# Patient Record
Sex: FEMALE | Race: WHITE | NOT HISPANIC OR LATINO | Employment: FULL TIME | URBAN - METROPOLITAN AREA
[De-identification: names, ages, dates, MRNs, and addresses within clinical notes are randomized per-mention and may not be internally consistent; named-entity substitution may affect disease eponyms.]

---

## 2017-01-30 ENCOUNTER — TRANSFERRED RECORDS (OUTPATIENT)
Dept: HEALTH INFORMATION MANAGEMENT | Facility: CLINIC | Age: 32
End: 2017-01-30

## 2017-02-13 ENCOUNTER — OFFICE VISIT (OUTPATIENT)
Dept: FAMILY MEDICINE | Facility: CLINIC | Age: 32
End: 2017-02-13
Payer: COMMERCIAL

## 2017-02-13 VITALS
BODY MASS INDEX: 22.48 KG/M2 | HEIGHT: 70 IN | WEIGHT: 157 LBS | DIASTOLIC BLOOD PRESSURE: 64 MMHG | HEART RATE: 80 BPM | TEMPERATURE: 98.4 F | SYSTOLIC BLOOD PRESSURE: 96 MMHG

## 2017-02-13 DIAGNOSIS — Z01.818 PREOP GENERAL PHYSICAL EXAM: Primary | ICD-10-CM

## 2017-02-13 DIAGNOSIS — Z31.9 INFERTILITY MANAGEMENT: ICD-10-CM

## 2017-02-13 PROCEDURE — 99214 OFFICE O/P EST MOD 30 MIN: CPT | Performed by: PHYSICIAN ASSISTANT

## 2017-02-13 NOTE — MR AVS SNAPSHOT
After Visit Summary   2/13/2017    Lolis Johnson    MRN: [id number]           Patient Information     Date Of Birth          1985        Visit Information        Provider Department      2/13/2017 6:00 PM Trudi Agrawal PA-C [facility]        Today's Diagnoses     Preop general physical exam    -  1    Infertility management          Care Instructions      Before Your Surgery      Call your surgeon if there is any change in your health. This includes signs of a cold or flu (such as a sore throat, runny nose, cough, rash or fever).    Do not smoke, drink alcohol or take over the counter medicine (unless your surgeon or primary care doctor tells you to) for the 24 hours before and after surgery.    If you take prescribed drugs: Follow your doctor s orders about which medicines to take and which to stop until after surgery.    Eating and drinking prior to surgery: follow the instructions from your surgeon    Take a shower or bath the night before surgery. Use the soap your surgeon gave you to gently clean your skin. If you do not have soap from your surgeon, use your regular soap. Do not shave or scrub the surgery site.  Wear clean pajamas and have clean sheets on your bed.         Follow-ups after your visit        Who to contact     If you have questions or need follow up information about today's clinic visit or your schedule please contact [facility] directly at [phone].  Normal or non-critical lab and imaging results will be communicated to you by MyChart, letter or phone within 4 business days after the clinic has received the results. If you do not hear from us within 7 days, please contact the clinic through MyChart or phone. If you have a critical or abnormal lab result, we will notify you by phone as soon as possible.  Submit refill requests through [employer] or call your pharmacy and they will forward the refill request to us. Please allow 3  "business days for your refill to be completed.          Additional Information About Your Visit        MyChart Information     [employer]orena [employer] gives you secure access to your electronic health record. If you see a primary care provider, you can also send messages to your care team and make appointments. If you have questions, please call your primary care clinic.  If you do not have a primary care provider, please call [phone] and they will assist you.        Care EveryWhere ID     This is your Care EveryWhere ID. This could be used by other organizations to access your [place] medical records  [id number]        Your Vitals Were     Pulse Temperature Height Last Period BMI (Body Mass Index)       80 98.4  F (36.9  C) (Oral) 5' 9.75\" (1.772 m) 02/07/2017 (Approximate) 22.69 kg/m2        Blood Pressure from Last 3 Encounters:   02/13/17 96/64   11/14/16 116/68   04/28/16 116/62    Weight from Last 3 Encounters:   02/13/17 157 lb (71.2 kg)   11/14/16 152 lb 4 oz (69.1 kg)   04/28/16 152 lb 12.8 oz (69.3 kg)              Today, you had the following     No orders found for display         Today's Medication Changes          These changes are accurate as of: 2/13/17  6:19 PM.  If you have any questions, ask your nurse or doctor.               These medicines have changed or have updated prescriptions.        Dose/Directions    levothyroxine 25 MCG tablet   Commonly known as:  SYNTHROID/LEVOTHROID   This may have changed:  additional instructions   Used for:  Subclinical hypothyroidism        Dose:  25 mcg   Take 1 tablet (25 mcg) by mouth daily   Quantity:  90 tablet   Refills:  3                Primary Care Provider    None Specified       No primary provider on file.        Thank you!     Thank you for choosing [facility]  for your care. Our goal is always to provide you with excellent care. Hearing back from our patients is one way we can continue to improve our services. Please take a few minutes " to complete the written survey that you may receive in the mail after your visit with us. Thank you!             Your Updated Medication List - Protect others around you: Learn how to safely use, store and throw away your medicines at www.disposemymeds.org.          This list is accurate as of: 2/13/17  6:19 PM.  Always use your most recent med list.                   Brand Name Dispense Instructions for use    levothyroxine 25 MCG tablet    SYNTHROID/LEVOTHROID    90 tablet    Take 1 tablet (25 mcg) by mouth daily       MULTIVITAMIN PO

## 2017-02-13 NOTE — PROGRESS NOTES
[address] [id number] [phone]  Dept: [phone]    PRE-OP EVALUATION:  Today's date: 2017    Lolis Johnson (: 1985) presents for pre-operative evaluation assessment as requested by Dr. Constantino.  She requires evaluation and anesthesia risk assessment prior to undergoing surgery/procedure for treatment of fertility .  Proposed procedure: IVF     Date of Surgery/ Procedure: 17  Time of Surgery/ Procedure: 10am  Hospital/Surgical Facility: [facility]  Fax number for surgical facility: [phone]  Primary Physician: No primary care provider on file.  Type of Anesthesia Anticipated: to be determined    Patient has a Health Care Directive or Living Will:  NO    1. NO - Do you have a history of heart attack, stroke, stent, bypass or surgery on an artery in the head, neck, heart or legs?  2. NO - Do you ever have any pain or discomfort in your chest?  3. NO - Do you have a history of  Heart Failure?  4. NO - Are you troubled by shortness of breath when: walking on the level, up a slight hill or at night?  5. NO - Do you currently have a cold, bronchitis or other respiratory infection?  6. NO - Do you have a cough, shortness of breath or wheezing?  7. NO - Do you sometimes get pains in the calves of your legs when you walk?  8. NO - Do you or anyone in your family have previous history of blood clots?  9. NO - Do you or does anyone in your family have a serious bleeding problem such as prolonged bleeding following surgeries or cuts?  10. NO - Have you ever had problems with anemia or been told to take iron pills?  11. NO - Have you had any abnormal blood loss such as black, tarry or bloody stools, or abnormal vaginal bleeding?  12. NO - Have you ever had a blood transfusion?  13. NO - Have you or any of your relatives ever had problems with anesthesia?  14. NO - Do you have sleep apnea, excessive snoring or daytime drowsiness?  15. NO  - Do you have any prosthetic heart valves?  16. NO - Do you have prosthetic joints?  17. NO - Is there any chance that you may be pregnant?      HPI:                                                      Brief HPI related to upcoming procedure: Infertility management      HYPOTHYROIDISM - Patient has a longstanding history of chronic Hypothyroidism. Patient has been doing well, noting no tremor, insomnia, hair loss or changes in skin texture. Last TSH value of   TSH   Date Value Ref Range Status   07/12/2016 1.70 0.40 - 4.00 mU/L Final   . Continues to take medications as directed, without adverse reactions or side effects.                                                                                                                                                                                                                        .    MEDICAL HISTORY:                                                      Patient Active Problem List    Diagnosis Date Noted     Subclinical hypothyroidism 04/29/2016     Priority: Medium      Past Medical History   Diagnosis Date     NO ACTIVE PROBLEMS      Subclinical hypothyroidism 4/29/2016     Past Surgical History   Procedure Laterality Date     Sinus surgery       Current Outpatient Prescriptions   Medication Sig Dispense Refill     levothyroxine (SYNTHROID, LEVOTHROID) 25 MCG tablet Take 1 tablet (25 mcg) by mouth daily (Patient taking differently: Take 25 mcg by mouth daily Take 2 tablets daily (50mcg)) 90 tablet 3     Multiple Vitamins-Minerals (MULTIVITAMIN PO)        OTC products: None, except as noted above    Allergies   Allergen Reactions     Sulfa Drugs       Latex Allergy: NO    Social History   Substance Use Topics     Smoking status: Never Smoker     Smokeless tobacco: Never Used     Alcohol use 0.0 oz/week      Comment: occ     History   Drug Use No       REVIEW OF SYSTEMS:                                                    Constitutional, neuro, ENT, endocrine,  "pulmonary, cardiac, gastrointestinal, genitourinary, musculoskeletal, integument and psychiatric systems are negative, except as otherwise noted.    EXAM:                                                    BP 96/64 (Cuff Size: Adult Regular)  Pulse 80  Temp 98.4  F (36.9  C) (Oral)  Ht 5' 9.75\" (1.772 m)  Wt 157 lb (71.2 kg)  LMP 02/07/2017 (Approximate)  BMI 22.69 kg/m2    GENERAL APPEARANCE: healthy, alert and no distress     EYES: EOMI,- PERRL     HENT: ear canals and TM's normal and nose and mouth without ulcers or lesions     NECK: no adenopathy, no asymmetry, masses, or scars and thyroid normal to palpation     RESP: lungs clear to auscultation - no rales, rhonchi or wheezes     CV: regular rates and rhythm, normal S1 S2, no S3 or S4 and no murmur, click or rub -     ABDOMEN:  soft, nontender, no HSM or masses and bowel sounds normal     MS: extremities normal- no gross deformities noted, no evidence of inflammation in joints, FROM in all extremities.     NEURO: Normal strength and tone, sensory exam grossly normal, mentation intact and speech normal     PSYCH: mentation appears normal. and affect normal/bright    DIAGNOSTICS:                                                    No labs or EKG required for low risk surgery and no cardiac risk factors.    IMPRESSION:                                                    Reason for surgery/procedure: Infertility management    The proposed surgical procedure is considered INTERMEDIATE risk.    REVISED CARDIAC RISK INDEX  The patient has the following serious cardiovascular risks for perioperative complications such as (MI, PE, VFib and 3  AV Block):  No serious cardiac risks  INTERPRETATION: 0 risks: Class I (very low risk - 0.4% complication rate)    The patient has the following additional risks for perioperative complications:  No identified additional risks      ICD-10-CM    1. Preop general physical exam Z01.818    2. Infertility management Z31.9  "       RECOMMENDATIONS:                                                      --Patient is to take all scheduled medications on the day of surgery   APPROVAL GIVEN to proceed with proposed procedure, without further diagnostic evaluation       Signed Electronically by: Trudi Agrawal PA-C    Copy of this evaluation report is provided to requesting physician.    [place] Preop Guidelines

## 2017-02-14 NOTE — NURSING NOTE
"Chief Complaint   Patient presents with     Pre-Op Exam       Initial BP 96/64 (Cuff Size: Adult Regular)  Pulse 80  Temp 98.4  F (36.9  C) (Oral)  Ht 5' 9.75\" (1.772 m)  Wt 157 lb (71.2 kg)  LMP 02/07/2017 (Approximate)  BMI 22.69 kg/m2 Estimated body mass index is 22.69 kg/(m^2) as calculated from the following:    Height as of this encounter: 5' 9.75\" (1.772 m).    Weight as of this encounter: 157 lb (71.2 kg).  Medication Reconciliation: complete   Melisa De La Torre, Certified Medical Assistant (AAMA)     "

## 2017-02-14 NOTE — NURSING NOTE
Pre-op faxed to [facility] at [phone]    Melisa De La Torre, Certified Medical Assistant (AAMA)

## 2017-02-24 ENCOUNTER — TRANSFERRED RECORDS (OUTPATIENT)
Dept: HEALTH INFORMATION MANAGEMENT | Facility: CLINIC | Age: 32
End: 2017-02-24

## 2017-03-08 ENCOUNTER — TRANSFERRED RECORDS (OUTPATIENT)
Dept: HEALTH INFORMATION MANAGEMENT | Facility: CLINIC | Age: 32
End: 2017-03-08

## 2017-03-17 ENCOUNTER — TELEPHONE (OUTPATIENT)
Dept: MATERNAL FETAL MEDICINE | Facility: CLINIC | Age: 32
End: 2017-03-17

## 2017-03-20 ENCOUNTER — MYC MEDICAL ADVICE (OUTPATIENT)
Dept: OBGYN | Facility: CLINIC | Age: 32
End: 2017-03-20

## 2017-03-20 DIAGNOSIS — Z82.79 FAMILY HISTORY OF CHROMOSOMAL ABNORMALITY: Primary | ICD-10-CM

## 2017-03-20 NOTE — TELEPHONE ENCOUNTER
Lolis contacted me stating that she has recently become pregnant after multiple rounds of IVF.  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  At the time the couple had thought that they would try to achieve a pregnancy spontaneously and then would pursue [facility] for chromosome testing in pregnancy.  After a period of infertility the couple chose to proceed with IVF; however, was not able to have PGD.  Lolis has recently had a postive pregnancy test and is wondering about scheduling a CVS.    I explained that, unfortunately, our provider who previously performed CVS is no longer in our practice.  I went on to review the options of [facility]S at [facility] versus [place].      Lolis indicated that she would like to talk with her primary provider, Dr. Lu, to see what her recommendation would be.  She understands that she may contact me if she has further questions or needs help with a referral.    Kathy Nugent MS [facility]  Certified Genetic Counselor  Maternal Fetal Medicine  [facility]  Voice Mail:  [phone]  E-Mail:  [email or web address]  Pager:  [phone]

## 2017-03-22 ENCOUNTER — TELEPHONE (OUTPATIENT)
Dept: MATERNAL FETAL MEDICINE | Facility: CLINIC | Age: 32
End: 2017-03-22

## 2017-03-30 NOTE — TELEPHONE ENCOUNTER
Order and records for [facility]S were faxed to [facility].  Pt hasn't had any labs drawn yet, so order placed per Dr. Lu. Pt only 6 weeks pregnant.  [facility] wanted to make sure viable pregnancy to schedule and pt has u/s with Reproductive Medicine clinic at 9 weeks for another u/s. Lolis was given [facility] phone number to call for any further questions or concerns with getting the CVS scheduled. Melisa Harmon RN

## 2017-04-03 DIAGNOSIS — Z82.79 FAMILY HISTORY OF CHROMOSOMAL ABNORMALITY: ICD-10-CM

## 2017-04-03 PROCEDURE — 86850 RBC ANTIBODY SCREEN: CPT | Performed by: OBSTETRICS & GYNECOLOGY

## 2017-04-03 PROCEDURE — 36415 COLL VENOUS BLD VENIPUNCTURE: CPT | Performed by: OBSTETRICS & GYNECOLOGY

## 2017-04-03 PROCEDURE — 86900 BLOOD TYPING SEROLOGIC ABO: CPT | Performed by: OBSTETRICS & GYNECOLOGY

## 2017-04-03 PROCEDURE — 86901 BLOOD TYPING SEROLOGIC RH(D): CPT | Performed by: OBSTETRICS & GYNECOLOGY

## 2017-04-04 LAB
ABO + RH BLD: NORMAL
ABO + RH BLD: NORMAL
BLD GP AB SCN SERPL QL: NORMAL
BLOOD BANK CMNT PATIENT-IMP: NORMAL
SPECIMEN EXP DATE BLD: NORMAL

## 2017-04-05 NOTE — TELEPHONE ENCOUNTER
TC to patient to find out where to fax results. Need to know which [place]  to fax it to.    Brianna Verdugo, RN-BSN

## 2017-04-05 NOTE — TELEPHONE ENCOUNTER
Gilbert Danielle,  My nurses might need a release of information, but I'll have them fax the results over if they can.    Do you have more appointments with your fertility clinic before then?    Dr. Lu

## 2017-04-24 ENCOUNTER — PRENATAL OFFICE VISIT (OUTPATIENT)
Dept: NURSING | Facility: CLINIC | Age: 32
End: 2017-04-24
Payer: COMMERCIAL

## 2017-04-24 VITALS — DIASTOLIC BLOOD PRESSURE: 54 MMHG | BODY MASS INDEX: 23.7 KG/M2 | WEIGHT: 164 LBS | SYSTOLIC BLOOD PRESSURE: 94 MMHG

## 2017-04-24 DIAGNOSIS — E03.8 SUBCLINICAL HYPOTHYROIDISM: ICD-10-CM

## 2017-04-24 DIAGNOSIS — N91.2 ABSENCE OF MENSTRUATION: ICD-10-CM

## 2017-04-24 DIAGNOSIS — O09.91 HIGH-RISK PREGNANCY, FIRST TRIMESTER: Primary | ICD-10-CM

## 2017-04-24 PROBLEM — O09.90 HIGH-RISK PREGNANCY: Status: ACTIVE | Noted: 2017-04-24

## 2017-04-24 LAB
ABO + RH BLD: NORMAL
ABO + RH BLD: NORMAL
ALBUMIN UR-MCNC: NEGATIVE MG/DL
APPEARANCE UR: CLEAR
BETA HCG QUAL IFA URINE: POSITIVE
BILIRUB UR QL STRIP: NEGATIVE
BLD GP AB SCN SERPL QL: NORMAL
BLOOD BANK CMNT PATIENT-IMP: NORMAL
COLOR UR AUTO: YELLOW
ERYTHROCYTE [DISTWIDTH] IN BLOOD BY AUTOMATED COUNT: 12.3 % (ref 10–15)
GLUCOSE UR STRIP-MCNC: NEGATIVE MG/DL
HCT VFR BLD AUTO: 37.1 % (ref 35–47)
HGB BLD-MCNC: 12.5 G/DL (ref 11.7–15.7)
HGB UR QL STRIP: NEGATIVE
KETONES UR STRIP-MCNC: NEGATIVE MG/DL
LEUKOCYTE ESTERASE UR QL STRIP: ABNORMAL
MCH RBC QN AUTO: 32.3 PG (ref 26.5–33)
MCHC RBC AUTO-ENTMCNC: 33.7 G/DL (ref 31.5–36.5)
MCV RBC AUTO: 96 FL (ref 78–100)
NITRATE UR QL: NEGATIVE
PH UR STRIP: 7 PH (ref 5–7)
PLATELET # BLD AUTO: 247 10E9/L (ref 150–450)
RBC # BLD AUTO: 3.87 10E12/L (ref 3.8–5.2)
SP GR UR STRIP: 1.01 (ref 1–1.03)
SPECIMEN EXP DATE BLD: NORMAL
URN SPEC COLLECT METH UR: ABNORMAL
UROBILINOGEN UR STRIP-ACNC: 0.2 EU/DL (ref 0.2–1)
WBC # BLD AUTO: 9.3 10E9/L (ref 4–11)

## 2017-04-24 PROCEDURE — 86780 TREPONEMA PALLIDUM: CPT | Performed by: OBSTETRICS & GYNECOLOGY

## 2017-04-24 PROCEDURE — 86900 BLOOD TYPING SEROLOGIC ABO: CPT | Performed by: OBSTETRICS & GYNECOLOGY

## 2017-04-24 PROCEDURE — 84443 ASSAY THYROID STIM HORMONE: CPT | Performed by: OBSTETRICS & GYNECOLOGY

## 2017-04-24 PROCEDURE — 81003 URINALYSIS AUTO W/O SCOPE: CPT | Performed by: OBSTETRICS & GYNECOLOGY

## 2017-04-24 PROCEDURE — 86762 RUBELLA ANTIBODY: CPT | Performed by: OBSTETRICS & GYNECOLOGY

## 2017-04-24 PROCEDURE — 86850 RBC ANTIBODY SCREEN: CPT | Performed by: OBSTETRICS & GYNECOLOGY

## 2017-04-24 PROCEDURE — 86901 BLOOD TYPING SEROLOGIC RH(D): CPT | Performed by: OBSTETRICS & GYNECOLOGY

## 2017-04-24 PROCEDURE — 36415 COLL VENOUS BLD VENIPUNCTURE: CPT | Performed by: OBSTETRICS & GYNECOLOGY

## 2017-04-24 PROCEDURE — 84703 CHORIONIC GONADOTROPIN ASSAY: CPT | Performed by: OBSTETRICS & GYNECOLOGY

## 2017-04-24 PROCEDURE — 99207 ZZC NO CHARGE NURSE ONLY: CPT

## 2017-04-24 PROCEDURE — 85027 COMPLETE CBC AUTOMATED: CPT | Performed by: OBSTETRICS & GYNECOLOGY

## 2017-04-24 PROCEDURE — 87086 URINE CULTURE/COLONY COUNT: CPT | Performed by: OBSTETRICS & GYNECOLOGY

## 2017-04-24 PROCEDURE — 87389 HIV-1 AG W/HIV-1&-2 AB AG IA: CPT | Performed by: OBSTETRICS & GYNECOLOGY

## 2017-04-24 PROCEDURE — 87340 HEPATITIS B SURFACE AG IA: CPT | Performed by: OBSTETRICS & GYNECOLOGY

## 2017-04-24 RX ORDER — CETIRIZINE HYDROCHLORIDE 10 MG/1
10 TABLET ORAL DAILY
COMMUNITY
End: 2017-06-29

## 2017-04-24 NOTE — MR AVS SNAPSHOT
After Visit Summary   4/24/2017    Lolis Johnson    MRN: [id number]           Patient Information     Date Of Birth          1985        Visit Information        Provider Department      4/24/2017 2:00 PM NE RN [facility]        Today's Diagnoses     High-risk pregnancy    -  1    Absence of menstruation        Hypothyroidism           Follow-ups after your visit        Your next 10 appointments already scheduled     May 11, 2017  8:15 AM CDT   New Prenatal with Christiane Lu MD   [facility]    [address] [place] [id number] [phone]              Who to contact     If you have questions or need follow up information about today's clinic visit or your schedule please contact [facility] directly at [phone].  Normal or non-critical lab and imaging results will be communicated to you by MyChart, letter or phone within 4 business days after the clinic has received the results. If you do not hear from us within 7 days, please contact the clinic through MyChart or phone. If you have a critical or abnormal lab result, we will notify you by phone as soon as possible.  Submit refill requests through [employer] or call your pharmacy and they will forward the refill request to us. Please allow 3 business days for your refill to be completed.          Additional Information About Your Visit        MyChart Information     [employer] gives you secure access to your electronic health record. If you see a primary care provider, you can also send messages to your care team and make appointments. If you have questions, please call your primary care clinic.  If you do not have a primary care provider, please call [phone] and they will assist you.        Care EveryWhere ID     This is your Care EveryWhere ID. This could be used by other organizations to access your [facility]  records  [id number]        Your Vitals Were     Last Period BMI (Body Mass Index)                02/07/2017 (Approximate) 23.7 kg/m2           Blood Pressure from Last 3 Encounters:   04/24/17 94/54   02/13/17 96/64   11/14/16 116/68    Weight from Last 3 Encounters:   04/24/17 164 lb (74.4 kg)   02/13/17 157 lb (71.2 kg)   11/14/16 152 lb 4 oz (69.1 kg)              We Performed the Following     ABO/RH TYPE AND SCREEN     Anti Treponema     Beta HCG qual IFA urine     CBC WITH PLATELETS     Hepatitis B surface antigen     HIV Antigen Antibody Combo     Rubella Antibody IgG Quantitative     TSH with free T4 reflex     URINE MACROSCOPIC ONLY          Today's Medication Changes          These changes are accurate as of: 4/24/17  2:54 PM.  If you have any questions, ask your nurse or doctor.               These medicines have changed or have updated prescriptions.        Dose/Directions    levothyroxine 25 MCG tablet   Commonly known as:  SYNTHROID/LEVOTHROID   This may have changed:    - how much to take  - additional instructions   Used for:  Subclinical hypothyroidism        Dose:  25 mcg   Take 1 tablet (25 mcg) by mouth daily   Quantity:  90 tablet   Refills:  3                Primary Care Provider    None Specified       No primary provider on file.        Thank you!     Thank you for choosing [facility]  for your care. Our goal is always to provide you with excellent care. Hearing back from our patients is one way we can continue to improve our services. Please take a few minutes to complete the written survey that you may receive in the mail after your visit with us. Thank you!             Your Updated Medication List - Protect others around you: Learn how to safely use, store and throw away your medicines at www.disposemymeds.org.          This list is accurate as of: 4/24/17  2:54 PM.  Always use your most recent med list.                   Brand Name Dispense Instructions for use     cetirizine 10 MG tablet    zyrTEC     Take 10 mg by mouth daily       levothyroxine 25 MCG tablet    SYNTHROID/LEVOTHROID    90 tablet    Take 1 tablet (25 mcg) by mouth daily       MULTIVITAMIN PO

## 2017-04-24 NOTE — Clinical Note
MADISON her CVS is scheduled for May 4. She is seeing you May 11. She needs a refill of her levothyroxine but has increased to 50mcg now instead of 25 at the instruction of her infertility provider so I can't refill it per our protocol, will you do this please?  Thanks Nyasia

## 2017-04-24 NOTE — NURSING NOTE
Patient presents to clinic today for prenatal education. This is the patient's first pregnancy.    Discussed all of the options within [place] for her prenatal care including, Dr Lozano and  here at [facility], midwives at [facility]- and Dr Lu.    Discussed pt's responses to Prenatal Questionnaire. Reviewed and discussed OB 1st Trimester Plans/Education (see list below) and also summarized in detail handouts and brochures included in OB Prenatal Folder that patient is able to keep and bring home with her.    Discussed all of the blood tests with pt who agrees to have all including HIV. Pt aware that results will be discussed at next office visit with MD unless abnl results are indicated and phone call will be made.    Will forward chart on to Prenatal Care Provider to review.    Nyasia Null RN

## 2017-04-24 NOTE — PROGRESS NOTES
Estimated Date of Delivery: Nov 14, 2017    She has not had bleeding since her LMP.   She has had mild nausea.. Weigh loss has not occurred.   This was a planned pregnancy.   OTHER CONCERNS: See below highlights - carrying triplets secondary to IVF.   STI HISTORY : None    Pre Term Labor Risk Assessment   1. Is the patient's age <18 or >40? No  2. Does the patient have a BMI < 18.5? No  3. If previous pregnancy, was delivery within previous 6 months? No  4. Have you ever been diagnosed with pyelonephritis? No  5. Have you ever delivered a baby prior to 37 weeks gestation? No  6. Have you ever been told you have a uterine anomaly? No  7. Do you currently have uterine fibroids? No  8. Have you had any gynecological surgical procedures such as cervical conization, a LEEP procedure, laser treatment or cryosurgery of the cervix? No  9. Did your mother take TEO or any other hormones when she was pregnant with you? No  10. Did conception for this pregnancy occur via In Vitro Fertilization? Yes: See notes scanned to [facility] from [facility]  11. Are you carrying twins? Yes: Triplets.   12. Do you currently use tobacco products? No  13. Prior to this pregnancy, how much alcohol did you drink each week? (if >7/week= high risk..) 1-2 drinks per week  14. Since you learned you were pregnant, how much beer, wine or hard liquor did you drink per week? (anything >0 = high risk) None since IVF procedure.  15. Prior to learning you are pregnant, did you use any of the following: marijuana, prescription narcotics, speed, cocaine, heroin, hallucinogens or other drugs? (any use within 1 yr = high risk) No  16. Since you learned you are pregnant, have you used any of the following: marijuana, prescription narcotics, speed, cocaine, heroin, hallucinogens or other drugs? (any use = high risk) No  17. Do you have a history of chemical dependency (yes=high risk) No  18. Have you ever been treated for more  than 1 Urinary Tract Infection during this pregnancy? No  19. Do you have a history of Depression, Bi-polar disorder, anxiety, schizophrenia or other mental illness? No  20. Are you currently being treated for depression, bi-polar disorder, anxiety, schizophrenia or other mental illness? No  21. Have you had Chlamydia or gonorrhea during this pregnancy? No  22. Periodontal disease (gum disease)? No  23. Has anyone hit, slapped, kicked or otherwise hurt you? No  24. Has anyone forced you to have sex when you didn't want to? No    Summary:  Patient is high risk for  Labor (verify Problem List includes V23.9 and note risk factor(s) in the Comments)  The patient has the following risk factors for pre term labor:  Q10: IVF this conception  Q11: Multiples this pregnancy             Prenatal OB Questionnaire  Past Medical History  Hypertension  No  Heart Disease, mitral valve prolapse, or rheumatic fever?  No  An autoimmune disorder such as Lupus or Rheumatoid Arthritis?  No  Kidney Disease or Urinary Tract Infection?  No  Epilepsy, seizures or spells?  No  Migraine headaches?  No  A stroke or loss of function or sensation?  No  Any other neurological problems?  No  Have you ever hepatitis, liver disease or jaundice?  No  Have you ever been treated for blood clots in your veins, deep venous thrombosis, inflammation in the veins, thrombosis, phlebitis, pulmonary embolism or varicosities?  No  Have you had excessive bleeding after surgery or dental work or have you been treated for very heavy periods?  No  Do you have a history of anemia?  No  Have you ever been treated for Diabetes?  No  Have you ever been treated for  thyroid problems or taken thyroid medication?  Yes  Have you ever been in a major accident or suffered serious trauma?  No  Have you ever had a blood transfusion?  No  Would you refuse a blood transfusion if a doctor judged it to be medically necessary?  No  If you answered yes, would you rather die  than have a blood transfusion?  NA  If you answered yes, is this for Jain reasons?  NA  Does anyone in your home smoke?  No  Is your blood type Rh negative?  No  Have you ever had abnormal antibodies in your blood?  No  Have you ever had asthma?  No  Have you ever had tuberculosis?  No  Do you have any allergies to drugs or over-the-counter medications?  Yes: Sulfa   Other allergies?  No  Do you have any breast problems?  No  Have you ever ?  No    Have you had any other surgical procedures?  Yes: Sinus surgery at age 14.   Have you been hospitalized for a nonsurgical reason excluding normal delivery?  No  Have you ever had any anesthetic complications?  No  Have you ever had an abnormal pap smear?  No  Do you have a history of abnormalities of the uterus?  No  Did it take you more than one year to become pregnant?  Yes  Have you ever been evaluated or treated for infertility?  Yes  Is there a history of medical problems in your family, which you feel might adversely affect your health or pregnancy?  No  Do you have any other problems we have not asked you about which you feel may be important to this pregnancy?  No  Do you have any of the following:    *abdominal pain  No  *blood in stool or urine  No  *chest pain  No  *shortness of breath  No  *coughing or vomiting up blood  No  *heart racing or skipping beats  No  *nausea and vomiting  No  *pain with urination  No  *vaginal discharge or bleeding  Yes: Increased discharge but no bleeding.   Current medications are:  Current Outpatient Prescriptions   Medication Sig Dispense Refill     levothyroxine (SYNTHROID, LEVOTHROID) 25 MCG tablet Take 1 tablet (25 mcg) by mouth daily (Patient taking differently: Take 25 mcg by mouth daily Take 2 tablets daily (50mcg)) 90 tablet 3     Multiple Vitamins-Minerals (MULTIVITAMIN PO)          Genetic Screening  At the time of birth, will you be 35 years old or older?  No  Has the patient, baby s father, or anyone in  either family had:  Thalassemia (Italian, Greek, Mediterranean, or  background only) and an MCV result less than 80?  No  Neural tube defect such as meningomyelocele, spina bifida or anencephaly?  No  Congenital heart defect?  No  Down s syndrome?  No  Erlin-Sach s disease (Catholic, Cajun, Greek-Algerian)?  No  Sickle cell disease or trait (Celina)?  No  Muscular dystrophy?  No  Cystic Fibrosis?  No  Shon s chorea?  No  Mental retardation/autism?  Yes: KAMERON's maternal uncle.   If yes, was the person tested for fragile X?  Unknown.   Any other inherited genetic or chromosomal disorder?  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 They have had genetic counseling and are going to do CVS testing at 12 weeks.   Maternal metabolic disorder (e.g. insulin-dependent diabetes, PKU)?  No  A child with birth defects not listed above?  No  Recurrent pregnancy loss or a stillbirth?  No  Does the patient or baby s father have any other genetic risks?  No  Infection History  Do we have your permission to complete routine prenatal lab tests.  This includes Hepatitis B, HIV? Yes  Do you feel that you are at high risk for coming in contact with the AIDS virus?  No  Have you ever been treated for tuberculosis?  No  Have you ever received the BCG vaccine for tuberculosis?  No  Do you live with someone who has tuberculosis?  No  Have you ever been exposed to tuberculosis?  No  Do you have genital herpes?  No  Does your partner have genital herpes?  No  Have you had a rash or viral illness since your last period?  No  Have you ever had Gonorrhea, Chlamydia, Syphilis, venereal warts, trichomoniasis, pelvic inflammatory disease or any other sexually transmitted disease?  No  Have you had chicken pox?  Yes  Have you been vaccinated against chicken pox?  No  Have you had any other infectious disease?  No    Early ultrasound screening tool:    Does patient have irregular periods?  No  Did patient use hormonal birth control in the  three months prior to positive urine pregnancy test?  No  Is the patient breastfeeding?  No  Is the patient 10 weeks or greater at time of education visit?  No  Has had ultrasounds done already through infertility provider.       Nyasia Null RN

## 2017-04-25 LAB
BACTERIA SPEC CULT: NO GROWTH
HBV SURFACE AG SERPL QL IA: NONREACTIVE
HIV 1+2 AB+HIV1 P24 AG SERPL QL IA: NORMAL
MICRO REPORT STATUS: NORMAL
RUBV IGG SERPL IA-ACNC: 52 IU/ML
SPECIMEN SOURCE: NORMAL
T PALLIDUM IGG+IGM SER QL: NEGATIVE
TSH SERPL DL<=0.005 MIU/L-ACNC: 0.9 MU/L (ref 0.4–4)

## 2017-04-25 RX ORDER — LEVOTHYROXINE SODIUM 50 UG/1
50 TABLET ORAL DAILY
Qty: 90 TABLET | Refills: 1 | Status: SHIPPED | OUTPATIENT
Start: 2017-04-25 | End: 2017-11-20

## 2017-04-26 NOTE — PROGRESS NOTES
Gilbert Danielle,  Congratulations!  Your prenatal labs are normal.  I look forward to seeing you soon.  Dr. Lu

## 2017-05-04 ENCOUNTER — TRANSFERRED RECORDS (OUTPATIENT)
Dept: HEALTH INFORMATION MANAGEMENT | Facility: CLINIC | Age: 32
End: 2017-05-04

## 2017-05-10 ENCOUNTER — TRANSFERRED RECORDS (OUTPATIENT)
Dept: HEALTH INFORMATION MANAGEMENT | Facility: CLINIC | Age: 32
End: 2017-05-10

## 2017-05-11 ENCOUNTER — PRENATAL OFFICE VISIT (OUTPATIENT)
Dept: OBGYN | Facility: CLINIC | Age: 32
End: 2017-05-11
Payer: COMMERCIAL

## 2017-05-11 VITALS
BODY MASS INDEX: 23.91 KG/M2 | DIASTOLIC BLOOD PRESSURE: 62 MMHG | WEIGHT: 167 LBS | HEIGHT: 70 IN | SYSTOLIC BLOOD PRESSURE: 116 MMHG | TEMPERATURE: 97.6 F

## 2017-05-11 DIAGNOSIS — O30.101 TRIPLET GESTATION IN FIRST TRIMESTER, UNSPECIFIED MULTIPLE GESTATION TYPE: ICD-10-CM

## 2017-05-11 DIAGNOSIS — O09.91 HIGH-RISK PREGNANCY, FIRST TRIMESTER: Primary | ICD-10-CM

## 2017-05-11 PROCEDURE — 99214 OFFICE O/P EST MOD 30 MIN: CPT | Performed by: OBSTETRICS & GYNECOLOGY

## 2017-05-11 PROCEDURE — 87624 HPV HI-RISK TYP POOLED RSLT: CPT | Performed by: OBSTETRICS & GYNECOLOGY

## 2017-05-11 PROCEDURE — G0145 SCR C/V CYTO,THINLAYER,RESCR: HCPCS | Performed by: OBSTETRICS & GYNECOLOGY

## 2017-05-11 NOTE — NURSING NOTE
"Chief Complaint   Patient presents with     Prenatal Care       Initial /62  Temp 97.6  F (36.4  C) (Oral)  Ht 5' 9.75\" (1.772 m)  Wt 167 lb (75.8 kg)  LMP 2017 (Approximate)  BMI 24.13 kg/m2 Estimated body mass index is 24.13 kg/(m^2) as calculated from the following:    Height as of this encounter: 5' 9.75\" (1.772 m).    Weight as of this encounter: 167 lb (75.8 kg).  BP completed using cuff size: regular        The following HM Due: pap smear      The following patient reported/Care Every where data was sent to:  P ABSTRACT QUALITY INITIATIVES [94014]       patient has appointment for today    Lucrecia Fernandez CMA               "

## 2017-05-11 NOTE — MR AVS SNAPSHOT
After Visit Summary   5/11/2017    Lolis Johnson    MRN: [id number]           Patient Information     Date Of Birth          1985        Visit Information        Provider Department      5/11/2017 8:15 AM Christiane Lu MD [facility]        Today's Diagnoses     High-risk pregnancy, first trimester    -  1    Triplet gestation in first trimester, unspecified multiple gestation type           Follow-ups after your visit        Your next 10 appointments already scheduled     Jun 08, 2017  9:15 AM CDT   ESTABLISHED PRENATAL with Christiane Lu MD   [facility]    [address] [place] [id number] [phone]              Who to contact     If you have questions or need follow up information about today's clinic visit or your schedule please contact [facility] directly at [phone].  Normal or non-critical lab and imaging results will be communicated to you by MyChart, letter or phone within 4 business days after the clinic has received the results. If you do not hear from us within 7 days, please contact the clinic through Palringohart or phone. If you have a critical or abnormal lab result, we will notify you by phone as soon as possible.  Submit refill requests through Yoke or call your pharmacy and they will forward the refill request to us. Please allow 3 business days for your refill to be completed.          Additional Information About Your Visit        MyChart Information     Yoke gives you secure access to your electronic health record. If you see a primary care provider, you can also send messages to your care team and make appointments. If you have questions, please call your primary care clinic.  If you do not have a primary care provider, please call [phone] and they will assist you.        Care EveryWhere ID     This is your Care EveryWhere ID. This  "could be used by other organizations to access your [facility] medical records  [id number]        Your Vitals Were     Temperature Height Last Period BMI (Body Mass Index)          97.6  F (36.4  C) (Oral) 5' 9.75\" (1.772 m) 02/07/2017 (Approximate) 24.13 kg/m2         Blood Pressure from Last 3 Encounters:   05/11/17 116/62   04/24/17 94/54   02/13/17 96/64    Weight from Last 3 Encounters:   05/11/17 167 lb (75.8 kg)   04/24/17 164 lb (74.4 kg)   02/13/17 157 lb (71.2 kg)              We Performed the Following     HPV High Risk Types DNA Cervical     Pap imaged thin layer screen with HPV - recommended age 30 - 65 years (select HPV order below)        Primary Care Provider    None Specified       No primary provider on file.        Thank you!     Thank you for choosing [facility]  for your care. Our goal is always to provide you with excellent care. Hearing back from our patients is one way we can continue to improve our services. Please take a few minutes to complete the written survey that you may receive in the mail after your visit with us. Thank you!             Your Updated Medication List - Protect others around you: Learn how to safely use, store and throw away your medicines at www.disposemymeds.org.          This list is accurate as of: 5/11/17 11:59 PM.  Always use your most recent med list.                   Brand Name Dispense Instructions for use    cetirizine 10 MG tablet    zyrTEC     Take 10 mg by mouth daily       levothyroxine 50 MCG tablet    SYNTHROID/LEVOTHROID    90 tablet    Take 1 tablet (50 mcg) by mouth daily       MULTIVITAMIN PO            "

## 2017-05-15 LAB
COPATH REPORT: NORMAL
PAP: NORMAL

## 2017-05-16 PROBLEM — O30.109 TRIPLET GESTATION: Status: ACTIVE | Noted: 2017-05-16

## 2017-05-16 LAB
FINAL DIAGNOSIS: NORMAL
HPV HR 12 DNA CVX QL NAA+PROBE: NEGATIVE
HPV16 DNA SPEC QL NAA+PROBE: NEGATIVE
HPV18 DNA SPEC QL NAA+PROBE: NEGATIVE
SPECIMEN DESCRIPTION: NORMAL

## 2017-05-16 NOTE — PROGRESS NOTES
SUBJECTIVE: Lolis Johnson is a 32 year old   here for initial OB visit.  Pregnancy complicated by triplet gestation.    has known balanced translocation. They were unable to do PGD as they had planned, three embryos transferred.   Had CVS x 2 to sample all three fetuses, results are pending. They plan selective reduction of any affected fetuses but would continue triplet pregnancy if all normal.     Past Medical History:   Diagnosis Date     NO ACTIVE PROBLEMS      Subclinical hypothyroidism 2016       Past Surgical History:   Procedure Laterality Date     SINUS SURGERY         Family History   Problem Relation Age of Onset     Breast Cancer Paternal Grandmother        Social History     Social History     Marital status:      Spouse name: N/A     Number of children: N/A     Years of education: N/A     Occupational History     Not on file.     Social History Main Topics     Smoking status: Never Smoker     Smokeless tobacco: Never Used     Alcohol use 0.0 oz/week      Comment: occ     Drug use: No     Sexual activity: Yes     Partners: Male     Birth control/ protection:      Other Topics Concern     Parent/Sibling W/ Cabg, Mi Or Angioplasty Before 65f 55m? No     Social History Narrative         Current Outpatient Prescriptions:      levothyroxine (SYNTHROID/LEVOTHROID) 50 MCG tablet, Take 1 tablet (50 mcg) by mouth daily, Disp: 90 tablet, Rfl: 1     cetirizine (ZYRTEC) 10 MG tablet, Take 10 mg by mouth daily, Disp: , Rfl:      Multiple Vitamins-Minerals (MULTIVITAMIN PO), , Disp: , Rfl:     Allergies   Allergen Reactions     Sulfa Drugs          Past Medical History of Father of Baby: balanced translocation    Review of Systems:   Constitutional, HEENT, cardiovascular, pulmonary, gi and gu systems are negative, except as otherwise noted.     History Since Last Menstrual Period: No Problems    EXAM:   Vitals:    17 0815   BP: 116/62   Temp: 97.6  F (36.4  C)   TempSrc: Oral   Weight:  "167 lb (75.8 kg)   Height: 5' 9.75\" (1.772 m)     GENERAL APPEARANCE: healthy, alert and no distress  EYES: EOMI,  PERRL  HENT: Nose and mouth without ulcers or lesions  NECK: no adenopathy, no asymmetry, masses, or scars and thyroid normal to palpation  RESP: lungs clear to auscultation - no rales, rhonchi or wheezes  BREAST: normal without masses, tenderness or nipple discharge and no palpable axillary masses or adenopathy  CV: regular rates and rhythm, normal S1 S2, no S3 or S4 and no murmur, click or rub -  ABDOMEN:  soft, nontender, no HSM or masses and bowel sounds normal  : normal cervix, adnexae, and uterus without masses or discharge  MS: extremities normal- no gross deformities noted, no evidence of inflammation in joints, FROM in all extremities.  SKIN: no suspicious lesions or rashes  NEURO: Normal strength and tone, sensory exam grossly normal, mentation intact and speech normal  PSYCH: mentation appears normal and affect normal/bright  LYMPHATICS: No axillary, cervical, inguinal, or supraclavicular nodes    Pelvix exam:  Perineum: Intact;   Vulva: Normal;  Vagina: Normal mucosa, no discharge,   Cervix: Nulliparous, closed, mobile,  no discharge;  Uterus: 15 weeks, Nontender;   Adnexa: Normal;  Anus: Normal without lesion or mass;   Bony Pelvis: Adequate.     Ultrasound: Informal for heart tones. Triplet gestation with cardiac activity x 3.     ASSESSMENT/ PLAN:  Lolis Johnson is a 32 year old   at 13 weeks 2 days by IVF dating  Follow up in 4 weeks.  Normal exercise.  Normal sexual activity.  Prenatal vitamins.  Anticipated weight gain:  BMI <25: 25-35 pounds  Discussed triplet pregnancy and risks: GDM, PIH, PTL, PTD, hospitalization. Patient will transfer to [facility] for pregnancy if ongoing triplets. Discussed risks of twin pregnancies, mode of delivery in addition to previously described risks.   TDaP 27-36 weeks  Oriented to practice, PNC  Discussed aneuploidy screening, pending. CVS done at " [facility], awaiting results.

## 2017-05-26 ENCOUNTER — PRE VISIT (OUTPATIENT)
Dept: MATERNAL FETAL MEDICINE | Facility: CLINIC | Age: 32
End: 2017-05-26

## 2017-05-26 ENCOUNTER — CARE COORDINATION (OUTPATIENT)
Dept: MATERNAL FETAL MEDICINE | Facility: CLINIC | Age: 32
End: 2017-05-26

## 2017-06-01 ENCOUNTER — TELEPHONE (OUTPATIENT)
Dept: MATERNAL FETAL MEDICINE | Facility: CLINIC | Age: 32
End: 2017-06-01

## 2017-06-01 NOTE — TELEPHONE ENCOUNTER
Writer left a message with Lolis to let her know that we can look into appointments for next week, but are aware pt has apt with [facility] tomorrow for reduction.  Writer explained to pt that if she continues twin pregnancy we would be happy to comanage u/s with Dr. Lu's office. Pt given [facility] number of [phone] to call back with any questions or concerns. Melisa Harmon RN

## 2017-06-02 ENCOUNTER — TRANSFERRED RECORDS (OUTPATIENT)
Dept: HEALTH INFORMATION MANAGEMENT | Facility: CLINIC | Age: 32
End: 2017-06-02

## 2017-06-06 ENCOUNTER — CARE COORDINATION (OUTPATIENT)
Dept: MATERNAL FETAL MEDICINE | Facility: CLINIC | Age: 32
End: 2017-06-06

## 2017-06-06 ENCOUNTER — TRANSFERRED RECORDS (OUTPATIENT)
Dept: HEALTH INFORMATION MANAGEMENT | Facility: CLINIC | Age: 32
End: 2017-06-06

## 2017-06-08 ENCOUNTER — PRENATAL OFFICE VISIT (OUTPATIENT)
Dept: OBGYN | Facility: CLINIC | Age: 32
End: 2017-06-08
Payer: COMMERCIAL

## 2017-06-08 VITALS
DIASTOLIC BLOOD PRESSURE: 60 MMHG | BODY MASS INDEX: 25.87 KG/M2 | SYSTOLIC BLOOD PRESSURE: 126 MMHG | WEIGHT: 179 LBS | TEMPERATURE: 97.7 F

## 2017-06-08 DIAGNOSIS — O30.112 TRIPLET GESTATION WITH TWO OR MORE MONOCHORIONIC FETUSES IN SECOND TRIMESTER: ICD-10-CM

## 2017-06-08 PROCEDURE — 99212 OFFICE O/P EST SF 10 MIN: CPT | Performed by: OBSTETRICS & GYNECOLOGY

## 2017-06-08 NOTE — MR AVS SNAPSHOT
After Visit Summary   6/8/2017    Lolis Johnson    MRN: [id number]           Patient Information     Date Of Birth          1985        Visit Information        Provider Department      6/8/2017 9:15 AM Christiane Lu MD [facility]        Today's Diagnoses     Triplet gestation with two or more monochorionic fetuses in second trimester           Follow-ups after your visit        Additional Services     MAT FETAL MED CTR REFERRAL-PREGNANCY       >> Patient may proceed with recommendations for further testing as directed by the Maternal Fetal Medicine Specialist >>    >> If requesting Fetal Echo: MFM will determine appropriate location for exam due to indication.    >> If requesting Lung Maturity Amnio:  If results indicate fetal lung maturity, induction or C/S is recommended within 36 hours.  Please schedule accordingly.     Dear Patient:   Please be aware that coverage of these services is subject to the terms and limitations of your health insurance plan.  Call member services at your health plan with any benefit or coverage questions.      Please bring the following to your appointment:    >>  Any x-rays, CTs or MRIs which have been performed.  Contact the facility where they were done to arrange for  prior to your scheduled appointment.  Any new CT, MRI or other procedures ordered by your specialist must be performed at a [place] facility or coordinated by your clinic's referral office.  >>  List of current medications   >>  This referral request   >>  Any documents/labs given to you for this referral                  Your next 10 appointments already scheduled     Jul 06, 2017  9:45 AM CDT   ESTABLISHED PRENATAL with [facility]e Lu MD   [facility]    [address] [place] [id number] [phone]              Who to contact     If you have questions or need follow up information  about today's clinic visit or your schedule please contact [facility] directly at [phone].  Normal or non-critical lab and imaging results will be communicated to you by MyChart, letter or phone within 4 business days after the clinic has received the results. If you do not hear from us within 7 days, please contact the clinic through Crunchyrollhart or phone. If you have a critical or abnormal lab result, we will notify you by phone as soon as possible.  Submit refill requests through Doubles Alley or call your pharmacy and they will forward the refill request to us. Please allow 3 business days for your refill to be completed.          Additional Information About Your Visit        [email or web address] Information     D[employer]ley gives you secure access to your electronic health record. If you see a primary care provider, you can also send messages to your care team and make appointments. If you have questions, please call your primary care clinic.  If you do not have a primary care provider, please call [phone] and they will assist you.        Care EveryWhere ID     This is your Care EveryWhere ID. This could be used by other organizations to access your [facility] medical records  [id number]        Your Vitals Were     Temperature Last Period BMI (Body Mass Index)             97.7  F (36.5  C) (Oral) 02/07/2017 (Approximate) 25.87 kg/m2          Blood Pressure from Last 3 Encounters:   06/08/17 126/60   05/11/17 116/62   04/24/17 94/54    Weight from Last 3 Encounters:   06/08/17 179 lb (81.2 kg)   05/11/17 167 lb (75.8 kg)   04/24/17 164 lb (74.4 kg)              We Performed the Following     MAT FETAL MED CTR REFERRAL-PREGNANCY        Primary Care Provider    None Specified       No primary provider on file.        Thank you!     Thank you for choosing [facility]  for your care. Our goal is always to provide you with excellent care. Hearing back from our patients is one way we can continue to  improve our services. Please take a few minutes to complete the written survey that you may receive in the mail after your visit with us. Thank you!             Your Updated Medication List - Protect others around you: Learn how to safely use, store and throw away your medicines at www.disposemymeds.org.          This list is accurate as of: 6/8/17  1:26 PM.  Always use your most recent med list.                   Brand Name Dispense Instructions for use    cetirizine 10 MG tablet    zyrTEC     Take 10 mg by mouth daily       levothyroxine 50 MCG tablet    SYNTHROID/LEVOTHROID    90 tablet    Take 1 tablet (50 mcg) by mouth daily       MULTIVITAMIN PO

## 2017-06-08 NOTE — PROGRESS NOTES
Doing ok.  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 Done at [facility], where CVS was done. It went well. No bleeding. Now eager to transfer [facility] services to [facility] to streamline care.  Doing well emotionally, helped to have genetic counseling and planning prior to pregnancy.  Having two boys, suspected to be mono-di.  [facility] ultrasound ordered  Discussed ultrasound monitoring of mono-di pregnancy.  RTC 3 weeks

## 2017-06-12 ENCOUNTER — PRE VISIT (OUTPATIENT)
Dept: MATERNAL FETAL MEDICINE | Facility: CLINIC | Age: 32
End: 2017-06-12

## 2017-06-14 ENCOUNTER — OFFICE VISIT (OUTPATIENT)
Dept: MATERNAL FETAL MEDICINE | Facility: CLINIC | Age: 32
End: 2017-06-14
Attending: OBSTETRICS & GYNECOLOGY
Payer: COMMERCIAL

## 2017-06-14 ENCOUNTER — HOSPITAL ENCOUNTER (OUTPATIENT)
Dept: ULTRASOUND IMAGING | Facility: CLINIC | Age: 32
Discharge: HOME | End: 2017-06-14
Attending: OBSTETRICS & GYNECOLOGY | Admitting: OBSTETRICS & GYNECOLOGY
Payer: COMMERCIAL

## 2017-06-14 DIAGNOSIS — O26.90 PREGNANCY RELATED CONDITION, UNSPECIFIED TRIMESTER: ICD-10-CM

## 2017-06-14 DIAGNOSIS — O30.049 DICHORIONIC DIAMNIOTIC TWIN PREGNANCY, ANTEPARTUM: ICD-10-CM

## 2017-06-14 DIAGNOSIS — O09.819 PREGNANCY RESULTING FROM IN VITRO FERTILIZATION, ANTEPARTUM: Primary | ICD-10-CM

## 2017-06-14 PROCEDURE — 76812 OB US DETAILED ADDL FETUS: CPT

## 2017-06-14 NOTE — MR AVS SNAPSHOT
After Visit Summary   6/14/2017    Lolis Johnson    MRN: [id number]           Patient Information     Date Of Birth          1985        Visit Information        Provider Department      6/14/2017 11:30 AM Luan Kebede MD [facility] Maternal Fetal Medicine - [place]        Today's Diagnoses     Pregnancy resulting from in vitro fertilization, antepartum    -  1    Dichorionic diamniotic twin pregnancy, antepartum           Follow-ups after your visit        Your next 10 appointments already scheduled     Jul 06, 2017  9:45 AM CDT   [facility]    [address] [place] [id number]   [phone]            Jul 12, 2017  8:45 AM CDT   MFM US COMPRE TWINS F/U with [id number]   [facility] Maternal Fetal Medicine Ultrasound - [place] ([facility])    [address]  [facility] [id number] [phone]           Wear comfortable clothes and leave your valuables at home.            Jul 12, 2017  9:15 AM CDT   Radiology MD with UR JAYLA KONG   [facility] Maternal Fetal Medicine - [facility])    [address]  [facility] [address]   [phone]           Please arrive at the time given for your first appointment.  This visit is used internally to schedule the physician's time during your ultrasound.              Future tests that were ordered for you today     Open Future Orders        Priority Expected Expires Ordered    Echo Fetal Complete-Peds Cardiology Routine 7/12/2017 6/14/2018 6/14/2017    Echo Fetal - Twin B Complete - Peds Cardiology Routine 7/12/2017 6/14/2018 6/14/2017    MFM US Comprehensive Twins F/U Routine 7/12/2017 4/14/2018 6/14/2017            Who to contact     If you have questions or need follow up information about today's clinic visit or your schedule please contact  [facility] MATERNAL FETAL MEDICINE [facility] directly at [phone].  Normal or non-critical lab and imaging results will be communicated to you by MyChart, letter or phone within 4 business days after the clinic has received the results. If you do not hear from us within 7 days, please contact the clinic through AddIn [employer] or phone. If you have a critical or abnormal lab result, we will notify you by phone as soon as possible.  Submit refill requests through [employer] or call your pharmacy and they will forward the refill request to us. Please allow 3 business days for your refill to be completed.          Additional Information About Your Visit        [employer]In [employer] Information     [employer]ar gives you secure access to your electronic health record. If you see a primary care provider, you can also send messages to your care team and make appointments. If you have questions, please call your primary care clinic.  If you do not have a primary care provider, please call [phone] and they will assist you.        Care EveryWhere ID     This is your Care EveryWhere ID. This could be used by other organizations to access your [place] medical records  [id number]        Your Vitals Were     Last Period                   02/07/2017 (Approximate)            Blood Pressure from Last 3 Encounters:   06/08/17 126/60   05/11/17 116/62   04/24/17 94/54    Weight from Last 3 Encounters:   06/08/17 81.2 kg (179 lb)   05/11/17 75.8 kg (167 lb)   04/24/17 74.4 kg (164 lb)               Primary Care Provider    None Specified       No primary provider on file.        Thank you!     Thank you for choosing [facility] MATERNAL FETAL [facility]  for your care. Our goal is always to provide you with excellent care. Hearing back from our patients is one way we can continue to improve our services. Please take a few minutes to complete the written survey that you may receive in the mail after your visit with us. Thank you!             Your  Updated Medication List - Protect others around you: Learn how to safely use, store and throw away your medicines at www.disposemymeds.org.          This list is accurate as of: 6/14/17 12:47 PM.  Always use your most recent med list.                   Brand Name Dispense Instructions for use    cetirizine 10 MG tablet    zyrTEC     Take 10 mg by mouth daily       levothyroxine 50 MCG tablet    SYNTHROID/LEVOTHROID    90 tablet    Take 1 tablet (50 mcg) by mouth daily       MULTIVITAMIN PO

## 2017-06-14 NOTE — PROGRESS NOTES
Please refer to ultrasound report under 'Imaging' Studies of 'Chart Review' tabs.    Luan Kebede M.D.

## 2017-06-18 ENCOUNTER — HOSPITAL ENCOUNTER (INPATIENT)
Facility: CLINIC | Age: 32
LOS: 4 days | Discharge: HOME | End: 2017-06-22
Attending: OBSTETRICS & GYNECOLOGY | Admitting: OBSTETRICS & GYNECOLOGY
Payer: COMMERCIAL

## 2017-06-18 DIAGNOSIS — O03.9 SPONTANEOUS ABORTION IN SECOND TRIMESTER: Primary | ICD-10-CM

## 2017-06-18 PROBLEM — O42.90 PROM (PREMATURE RUPTURE OF MEMBRANES): Status: ACTIVE | Noted: 2017-06-18

## 2017-06-18 PROBLEM — Z36.89 ENCOUNTER FOR TRIAGE IN PREGNANT PATIENT: Status: ACTIVE | Noted: 2017-06-18

## 2017-06-18 LAB
A1 MICROGLOB PLACENTAL VAG QL: NEGATIVE
ABO + RH BLD: NORMAL
ABO + RH BLD: NORMAL
ALBUMIN UR-MCNC: NEGATIVE MG/DL
APPEARANCE UR: ABNORMAL
APTT PPP: 26 SEC (ref 22–37)
BACTERIA #/AREA URNS HPF: ABNORMAL /HPF
BILIRUB UR QL STRIP: NEGATIVE
BLD GP AB SCN SERPL QL: NORMAL
BLOOD BANK CMNT PATIENT-IMP: NORMAL
COLOR UR AUTO: ABNORMAL
ERYTHROCYTE [DISTWIDTH] IN BLOOD BY AUTOMATED COUNT: 12.6 % (ref 10–15)
FIBRINOGEN PPP-MCNC: 638 MG/DL (ref 200–420)
GLUCOSE UR STRIP-MCNC: NEGATIVE MG/DL
HCT VFR BLD AUTO: 34.9 % (ref 35–47)
HGB BLD-MCNC: 11.7 G/DL (ref 11.7–15.7)
HGB UR QL STRIP: ABNORMAL
INR PPP: 1.02 (ref 0.86–1.14)
KETONES UR STRIP-MCNC: NEGATIVE MG/DL
LEUKOCYTE ESTERASE UR QL STRIP: ABNORMAL
MCH RBC QN AUTO: 32.1 PG (ref 26.5–33)
MCHC RBC AUTO-ENTMCNC: 33.5 G/DL (ref 31.5–36.5)
MCV RBC AUTO: 96 FL (ref 78–100)
MICRO REPORT STATUS: NORMAL
MUCOUS THREADS #/AREA URNS LPF: PRESENT /LPF
NITRATE UR QL: NEGATIVE
PH UR STRIP: 7.5 PH (ref 5–7)
PLATELET # BLD AUTO: 246 10E9/L (ref 150–450)
RBC # BLD AUTO: 3.64 10E12/L (ref 3.8–5.2)
RBC #/AREA URNS AUTO: 1 /HPF (ref 0–2)
SP GR UR STRIP: 1.01 (ref 1–1.03)
SPECIMEN EXP DATE BLD: NORMAL
SPECIMEN SOURCE: NORMAL
SQUAMOUS #/AREA URNS AUTO: <1 /HPF (ref 0–1)
URN SPEC COLLECT METH UR: ABNORMAL
UROBILINOGEN UR STRIP-MCNC: NORMAL MG/DL (ref 0–2)
WBC # BLD AUTO: 18.5 10E9/L (ref 4–11)
WBC #/AREA URNS AUTO: 4 /HPF (ref 0–2)
WET PREP SPEC: NORMAL

## 2017-06-18 PROCEDURE — 86901 BLOOD TYPING SEROLOGIC RH(D): CPT | Performed by: OBSTETRICS & GYNECOLOGY

## 2017-06-18 PROCEDURE — 87591 N.GONORRHOEAE DNA AMP PROB: CPT | Performed by: OBSTETRICS & GYNECOLOGY

## 2017-06-18 PROCEDURE — 87210 SMEAR WET MOUNT SALINE/INK: CPT | Performed by: OBSTETRICS & GYNECOLOGY

## 2017-06-18 PROCEDURE — 12000030 ZZH R&B OB INTERMEDIATE UMMC

## 2017-06-18 PROCEDURE — 81001 URINALYSIS AUTO W/SCOPE: CPT | Performed by: OBSTETRICS & GYNECOLOGY

## 2017-06-18 PROCEDURE — 36415 COLL VENOUS BLD VENIPUNCTURE: CPT | Performed by: OBSTETRICS & GYNECOLOGY

## 2017-06-18 PROCEDURE — 85384 FIBRINOGEN ACTIVITY: CPT | Performed by: OBSTETRICS & GYNECOLOGY

## 2017-06-18 PROCEDURE — 85730 THROMBOPLASTIN TIME PARTIAL: CPT | Performed by: OBSTETRICS & GYNECOLOGY

## 2017-06-18 PROCEDURE — 85610 PROTHROMBIN TIME: CPT | Performed by: OBSTETRICS & GYNECOLOGY

## 2017-06-18 PROCEDURE — 25000132 ZZH RX MED GY IP 250 OP 250 PS 637: Performed by: OBSTETRICS & GYNECOLOGY

## 2017-06-18 PROCEDURE — 25000128 H RX IP 250 OP 636: Performed by: OBSTETRICS & GYNECOLOGY

## 2017-06-18 PROCEDURE — 85027 COMPLETE CBC AUTOMATED: CPT | Performed by: OBSTETRICS & GYNECOLOGY

## 2017-06-18 PROCEDURE — 99215 OFFICE O/P EST HI 40 MIN: CPT

## 2017-06-18 PROCEDURE — 87491 CHLMYD TRACH DNA AMP PROBE: CPT | Performed by: OBSTETRICS & GYNECOLOGY

## 2017-06-18 PROCEDURE — 86850 RBC ANTIBODY SCREEN: CPT | Performed by: OBSTETRICS & GYNECOLOGY

## 2017-06-18 PROCEDURE — 84112 EVAL AMNIOTIC FLUID PROTEIN: CPT | Performed by: OBSTETRICS & GYNECOLOGY

## 2017-06-18 PROCEDURE — 99221 1ST HOSP IP/OBS SF/LOW 40: CPT | Mod: GC | Performed by: OBSTETRICS & GYNECOLOGY

## 2017-06-18 PROCEDURE — 86900 BLOOD TYPING SEROLOGIC ABO: CPT | Performed by: OBSTETRICS & GYNECOLOGY

## 2017-06-18 RX ORDER — HYDROXYZINE HYDROCHLORIDE 50 MG/1
100 TABLET, FILM COATED ORAL
Status: DISCONTINUED | OUTPATIENT
Start: 2017-06-18 | End: 2017-06-21

## 2017-06-18 RX ORDER — ONDANSETRON 2 MG/ML
4 INJECTION INTRAMUSCULAR; INTRAVENOUS EVERY 6 HOURS PRN
Status: DISCONTINUED | OUTPATIENT
Start: 2017-06-18 | End: 2017-06-21

## 2017-06-18 RX ORDER — CALCIUM CARBONATE 500(1250)
1 TABLET ORAL 2 TIMES DAILY
COMMUNITY
End: 2017-06-29

## 2017-06-18 RX ORDER — ACETAMINOPHEN 325 MG/1
650 TABLET ORAL EVERY 4 HOURS PRN
Status: DISCONTINUED | OUTPATIENT
Start: 2017-06-18 | End: 2017-06-18

## 2017-06-18 RX ORDER — LEVOTHYROXINE SODIUM 50 UG/1
50 TABLET ORAL DAILY
Status: DISCONTINUED | OUTPATIENT
Start: 2017-06-18 | End: 2017-06-21

## 2017-06-18 RX ORDER — ACETAMINOPHEN 650 MG/1
650 SUPPOSITORY RECTAL EVERY 4 HOURS PRN
Status: DISCONTINUED | OUTPATIENT
Start: 2017-06-18 | End: 2017-06-21

## 2017-06-18 RX ORDER — ACETAMINOPHEN 650 MG/1
650 SUPPOSITORY RECTAL EVERY 4 HOURS PRN
Status: DISCONTINUED | OUTPATIENT
Start: 2017-06-18 | End: 2017-06-18

## 2017-06-18 RX ORDER — LIDOCAINE 40 MG/G
CREAM TOPICAL
Status: DISCONTINUED | OUTPATIENT
Start: 2017-06-18 | End: 2017-06-21

## 2017-06-18 RX ORDER — ACETAMINOPHEN 325 MG/1
975 TABLET ORAL EVERY 4 HOURS PRN
Status: DISCONTINUED | OUTPATIENT
Start: 2017-06-18 | End: 2017-06-21

## 2017-06-18 RX ADMIN — HYDROXYZINE HYDROCHLORIDE 100 MG: 50 TABLET, FILM COATED ORAL at 21:33

## 2017-06-18 RX ADMIN — ACETAMINOPHEN 975 MG: 325 TABLET, FILM COATED ORAL at 18:12

## 2017-06-18 RX ADMIN — SODIUM CHLORIDE, POTASSIUM CHLORIDE, SODIUM LACTATE AND CALCIUM CHLORIDE 1000 ML: 600; 310; 30; 20 INJECTION, SOLUTION INTRAVENOUS at 10:00

## 2017-06-18 NOTE — IP AVS SNAPSHOT
MRN:[id number]                      After Visit Summary   6/18/2017    Lolis Johnson    MRN: [id number]           Thank you!     Thank you for choosing [place] for your care. Our goal is always to provide you with excellent care. Hearing back from our patients is one way we can continue to improve our services. Please take a few minutes to complete the written survey that you may receive in the mail after you visit with us. Thank you!        Patient Information     Date Of Birth          1985        Designated Caregiver       Most Recent Value    Caregiver    Will someone help with your care after discharge? no      About your hospital stay     You were admitted on:  June 18, 2017 You last received care in the:  [facility]    You were discharged on:  June 22, 2017       Who to Call     For medical emergencies, please call 911.  For non-urgent questions about your medical care, please call your primary care provider or clinic, [phone]          Attending Provider     Provider Specialty    Angélica Gonzalez MD OB/Gyn    Mickey, Charity WONG MD OB/Gyn    Abner, Angélica ESTRADA MD OB/Gyn       Primary Care Provider Office Phone # Fax #    Christiane Xuan Lu [phone]      After Care Instructions     Activity       Review discharge instructions            Diet       Resume previous diet            Discharge Instructions       Activity Instructions:   - Vaginal delivery: Nothing in the vagina for 6 weeks, no intercourse for 6 weeks, you are not restricted on other activities, but rest for 1-2 weeks to allow your body to recover from delivery.    Call your health care provider if you have any of the following: Fever above 100.4 F; opening or drainage from your incision; soaking a sanitary pad with blood within 1 hour, or you see blood clots larger than a golf ball; malodorous vaginal discharge, severe or worsening pain uncontrolled by your pain medications, nausea and vomiting, severe  "headaches, changes in vision, calf swelling or pain, shortness of breath, problems coping with sadness, anxiety, or depression.  If you have any concerns about hurting yourself or the baby, call your provider immediately. You are encouraged to call with questions or concerns after you return home.    Monitor and record:  Vaginal bleeding - call your provider if you are soaking more than a pad an hour for 2 hours, or passing clots larger than a golf ball.  Temperature - if you feel as though you may have a fever, take your temperature. If it is 100.4 F or more, please contact your provider.            Discharge Instructions - Postpartum visit       Schedule postpartum visit with your provider and return to clinic in 1 week and in 6 weeks.                  Further instructions from your care team        Loss Discharge Instructions   We recommend you see your provider to check on your physical and emotional recovery, and to walk through your experience within 1-2 weeks.  Any further recommendations for follow up will be discussed with your provider at that time.       The Blues and Grief  After delivery you will experience hormone changes and strong emotions, often referred to as the \"baby blues\".  You may find yourself easily upset, tearful or angry.  In addition, you will be grieving for your own loss and may feel terribly tired and not want to face friends, family or new babies.    Your feelings will be hard to predict during this time.  You may have many ups and downs.  Be kind and patient with yourself.    No two people grieve the same way.  This is true of spouses and partners.  Try to have open communication, but don't depend solely on each other for support.  Reach out to friends, family, clergy and your health care providers.  You don't have to handle this alone.    Normal grief after a pregnancy or  loss often lasts for weeks or months.  Over time, you will have more good days than bad ones.  " The first year is usually the hardest as you face significant dates and events for the first time.    At first, your sadness or anxiety may keep you from sleeping, eating, being with others or getting out of the house.  If, after a week, you aren't able to take care of basic daily tasks, please call your care provider right away.  It could be more serious than the blues or grief.  Going back to work:  Even though you did not bring home a living baby, you and your partner have a right to any family and bereavement leave benefits your employer offers.  When you do return to work, be prepared for some adjustment time.    Call your health care provider if you have any of these symptoms:  You soak a sanitary pad with blood within 1 hour, or you see blood clots larger than a golf ball.  Bleeding that lasts more than 6 weeks.  You have vaginal discharge that smells bad.   A fever above 100.4 F (38 C), with or without chills  Severe, pain, cramping or tenderness in your lower belly area.  If you have pain that increases or does not go away from an episiotomy or perineal tear  Increased pain, swelling, redness or fluid around your stitches.  A need to urinate more frequently (use the toilet more often), more urgently (use the toilet very quickly), or it burns when you urinate.  Redness, swelling or pain around a vein in your leg.  Problems with coping with sadness, anxiety, or depression.  Your breasts are engorged (hard and swollen), red and very tender and you have a fever.   If you have nausea and vomiting.  If you have chest pain and cough or are gasping for air.  You have questions or concerns after you return home.    Keep your hands clean:  Always wash your hands before touching your perineal area and stitches.  This helps reduce your risk of infection.  If your hands aren't dirty, you may use an alcohol hand-rub to clean your hands. Keep your nails clean and short.        Pending Results     Date and Time Order Name  "Status Description    6/21/2017 2221 Placenta path order and indications In process             Statement of Approval     Ordered          06/22/17 0824  I have reviewed and agree with all the recommendations and orders detailed in this document.  EFFECTIVE NOW     Approved and electronically signed by:  Catina Vallecillo MD             Admission Information     Date & Time Provider Department Dept. Phone    6/18/2017 Angélica Levine MD  [facility] [phone]      Your Vitals Were     Blood Pressure Pulse Temperature Respirations Height Weight    99/63 91 98.2  F (36.8  C) (Oral) 16 1.753 m (5' 9\") 81.2 kg (179 lb)    Last Period BMI (Body Mass Index)                02/07/2017 (Approximate) 26.43 kg/m2          MyChart Information     Your[employer]Online gives you secure access to your electronic health record. If you see a primary care provider, you can also send messages to your care team and make appointments. If you have questions, please call your primary care clinic.  If you do not have a primary care provider, please call [phone] and they will assist you.        Care EveryWhere ID     This is your Care EveryWhere ID. This could be used by other organizations to access your [place] medical records  [id number]        Equal Access to Services     LO PHAN : Hal Barrientos, chencho bauer, qanathan kaufman, yanelis godinez. So [facility] [phone].    ATENCIÓN: Si habla español, tiene a powers disposición servicios gratuitos de asistencia lingüística. Llame al [phone].    We comply with applicable federal civil rights laws and [place] laws. We do not discriminate on the basis of race, color, national origin, age, disability sex, sexual orientation or gender identity.               Review of your medicines      START taking        Dose / Directions    ibuprofen 400 MG tablet   Commonly known as:  ADVIL/MOTRIN        Dose:  400-800 mg   Take 1-2 tablets " (400-800 mg) by mouth every 6 hours as needed for other (cramping)   Quantity:  60 tablet   Refills:  0       senna-docusate 8.6-50 MG per tablet   Commonly known as:  SENOKOT-S;PERICOLACE        Dose:  1-2 tablet   Take 1-2 tablets by mouth 2 times daily   Quantity:  60 tablet   Refills:  0         CONTINUE these medicines which have NOT CHANGED        Dose / Directions    calcium carbonate 1250 MG tablet   Commonly known as:  OS-ANASTASIA 500 mg [place]        Dose:  1 tablet   Take 1 tablet by mouth 2 times daily   Refills:  0       cetirizine 10 MG tablet   Commonly known as:  zyrTEC        Dose:  10 mg   Take 10 mg by mouth daily   Refills:  0       ferrous sulfate 142 (45 FE) MG Tbcr   Commonly known as:  SLO-FE        Dose:  142 mg   Take 142 mg by mouth daily   Refills:  0       levothyroxine 50 MCG tablet   Commonly known as:  SYNTHROID/LEVOTHROID   Used for:  Subclinical hypothyroidism        Dose:  50 mcg   Take 1 tablet (50 mcg) by mouth daily   Quantity:  90 tablet   Refills:  1       MULTIVITAMIN PO        Refills:  0            Where to get your medicines      These medications were sent to [facility] [address]  [address] [address]     Phone:  [phone]     ibuprofen 400 MG tablet    senna-docusate 8.6-50 MG per tablet                Protect others around you: Learn how to safely use, store and throw away your medicines at www.disposemymeds.org.             Medication List: This is a list of all your medications and when to take them. Check marks below indicate your daily home schedule. Keep this list as a reference.      Medications           Morning Afternoon Evening Bedtime As Needed    calcium carbonate 1250 MG tablet   Commonly known as:  OS-ANASTASIA 500 mg [place]   Take 1 tablet by mouth 2 times daily                                cetirizine 10 MG tablet   Commonly known as:  zyrTEC   Take 10 mg by mouth daily                                 ferrous sulfate 142 (45 FE) MG Tbcr   Commonly known as:  SLO-FE   Take 142 mg by mouth daily                                ibuprofen 400 MG tablet   Commonly known as:  ADVIL/MOTRIN   Take 1-2 tablets (400-800 mg) by mouth every 6 hours as needed for other (cramping)   Last time this was given:  800 mg on 6/22/2017  8:27 AM                                levothyroxine 50 MCG tablet   Commonly known as:  SYNTHROID/LEVOTHROID   Take 1 tablet (50 mcg) by mouth daily   Last time this was given:  50 mcg on 6/21/2017  8:53 AM                                MULTIVITAMIN PO                                senna-docusate 8.6-50 MG per tablet   Commonly known as:  SENOKOT-S;PERICOLACE   Take 1-2 tablets by mouth 2 times daily

## 2017-06-18 NOTE — H&P
Antepartum History and Physical   2017  Lolis Johnson  [id number]      HPI: Lolis Johnson is a 32 year old  at 18w5d by ETD who presents with chief complaint of leaking of fluid. She states this began in the afternoon on  and has been ongoing since that time. Also reports that over the last 12 hours she has experienced ongoing lower abdominal cramping like a menses. She denies mattie vaginal bleeding. Denies fetal movement. She denies fevers, chills, headaches, vision changes, chest pain, shortness of breath, nausea, vomiting, diarrhea, constipation, dysuria, lower extremity swelling.     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   Current Outpatient Prescriptions on File Prior to Encounter:  levothyroxine (SYNTHROID/LEVOTHROID) 50 MCG tablet Take 1 tablet (50 mcg) by mouth daily   cetirizine (ZYRTEC) 10 MG tablet Take 10 mg by mouth daily   Multiple Vitamins-Minerals (MULTIVITAMIN PO)        Allergies:  Allergies   Allergen Reactions     Sulfa Drugs        FamilyHX:  Family History   Problem Relation Age of Onset     Breast Cancer Paternal Grandmother        SocialHX:   Social History     Social History     Marital status:      Spouse name: N/A     Number of children: N/A     Years of  "education: N/A     Social History Main Topics     Smoking status: Never Smoker     Smokeless tobacco: Never Used     Alcohol use No      Comment: occ     Drug use: No     Sexual activity: Yes     Partners: Male     Birth control/ protection:      Other Topics Concern     Parent/Sibling W/ Cabg, Mi Or Angioplasty Before 65f 55m? No     Social History Narrative       ROS: 10-point ROS negative except as indicated in HPI.    Physical Exam:  Vitals:    17 0432   BP: 105/61   Pulse: 97   Resp: 16   Temp: 97.9  F (36.6  C)   TempSrc: Oral   Weight: 81.2 kg (179 lb)   Height: 1.753 m (5' 9\")     General: alert, oriented female, resting in bed in NAD  CV: Regular rate, well perfused  Lungs: Normal respiratory effort  Abdomen: soft, gravid, nontender  Extremities: no lower extremity swelling in extremities bilaterally    SSE: cervix visually closed, moderate amount of clear fluid in vaginal vault    BSUS:   Triplet A: no cardiac activity, anhydramnios, anatomy distorted  Triplet B: , gross movement, Deepest vertical pocket >2 cm  Triplet C: , gross movement, Deepest vertical pocket >2 cm    Labs:    Lab Results   Component Value Date    ABO AB 2017    RH  Pos 2017    AS Neg 2017    HEPBANG Nonreactive 2017    TREPAB Negative 2017    HGB 12.5 2017       GBS Status:   No results found for: GBS    Lab Results   Component Value Date    PAP NIL 2017       Assessment: 32 year old  at 18w5d by ETD, here with concern for previable  premature rupture of membranes of triplet A in pregnancy complicated by trichorionic triamniotic triplet gestation s/p selective reduction of triplet A 17    Plan:    #) Previable PPROM s/p selective reduction Triplet A 17  - Case discussed with Dr. Shaista Lundberg: per her report, this does not represent normal postoperative recovery. It is possible and likely that the patient ruptured the amnion surrounding triplet A " (the reduced triplet) as this risk is increased when the presenting fetus is the reduced fetus. We discussed that this may represent an evolving second trimester spontaneous  of the reduced triplet and that this would put the patient at risk for spontaneously aborting triplet B and C. We discussed that she is at a significantly increased risk for developing an intrauterine infection in this setting. Per MFM, recommendations would be for greater than 24 hours of monitoring to ensure vital and clinical stability given high risk of infection, labor.   - The above noted information was relayed to the patient and she and her partner had the opportunity to answer questions.  - Wet prep, GC/Chlam, UA/UCx, UDS drawn and pending  - CBC, T&S, fibrinogen, INR, PTT drawn and pending  - Continue to monitor closely for signs or symptoms of evolving infection or second trimester miscarriage  - Given previable gestational ages, no attempts at fetal resuscitation to be made if patient were to begin spontaneously miscarrying   - Consider formal US today in radiology to evaluate fluid volume surrounding each of the triplets    Patient seen and care plan discussed under supervision of Christiane Lu MD in consultation with MD Nicki Obando MD  OBGYN PGY-3  [phone]  5:59 AM 2017      Physician Attestation   I, Christiane Lu, personally examined and evaluated this patient.  I discussed the patient with the resident and care team, and agree with the assessment and plan of care as documented in the resident s note of 17  [date].      I personally reviewed vital signs, medications, labs, imaging and exam.    Key findings: Agree with above. See further discussion in progress note from later in the day.  Christiane Lu  Date of Service (when I saw the patient): 17

## 2017-06-18 NOTE — PLAN OF CARE
Problem: PROM, PPROM, Prolonged Rupture of Membranes (Adult,Obstetrics,Pediatric)  Goal: Signs and Symptoms of Listed Potential Problems Will be Absent or Manageable (PROM, PPROM, Prolonged Rupture of Membranes)  Signs and symptoms of listed potential problems will be absent or manageable by discharge/transition of care (reference PROM, PPROM, Prolonged Rupture of Membranes (Adult,Obstetrics,Pediatric) CPG).  Outcome: No Change  Data: Afebrile. Leaking small amounts of clear fluid. Contraction pattern increased. Fetal assessment by doppler Signs and symptoms of infection absent  Interventions: Monitor vital signs and indicators of infection every 4 hours while awake. Continue uterine and fetal assessment  Activity level: Activity restricted to bedrest with bathroom privilege. Preventive measures include Positioning, Frequent voiding and IV fluid. Encourage active range of motion and frequent position changes.  Plan: Continue expectant management. Observe for and notify care provider of indicators of progressing labor, signs/symptoms of infection, or fetal/maternal compromise.

## 2017-06-18 NOTE — IP AVS SNAPSHOT
[facility]    [address]    [facility] [place] [address]    Phone:  [phone]                                       After Visit Summary   6/18/2017    Lolis Johnson    MRN: [id number]           After Visit Summary Signature Page     I have received my discharge instructions, and my questions have been answered. I have discussed any challenges I see with this plan with the nurse or doctor.    ..........................................................................................................................................  Patient/Patient Representative Signature      ..........................................................................................................................................  Patient Representative Print Name and Relationship to Patient    ..................................................               ................................................  Date                                            Time    ..........................................................................................................................................  Reviewed by Signature/Title    ...................................................              ..............................................  Date                                                            Time

## 2017-06-18 NOTE — PROGRESS NOTES
"Brief Labor Progress Note    Subjective: Patient doing \"ok.\"  Unsure what to think with current situation and ok to watch throughout day and make plan with MFM following U/S tmw to assess for fluid volume.  Some lower abdominal cramping, but feels more like gas cramps.  No fevers or chills.       Objective:  Vitals:    17 0432 17 0600   BP: 105/61 106/64   Pulse: 97 96   Resp: 16 20   Temp: 97.9  F (36.6  C) 98.3  F (36.8  C)   TempSrc: Oral Oral   Weight: 81.2 kg (179 lb)    Height: 1.753 m (5' 9\")      Cervix on SSE: pooling, visually closed (previously today)  Membranes: suspected PPROM of Triplet A     FHR Doptones:  -fetus A: s/p reduction  - fetus B: 160s bpm  - Fetus C: 160s bpm  [place]: contractions 2-3/10 min, irregular    A/P:  Lolis Johnson is a 32 year old  at 18w5d here for PPROM with uterine cramping with Tri-tri pregnancy - now Di-Di pregnancy s/p reduction of Triplet A. Will assess PPROM and fetus status with MFM U/S tomorrow.  Still amriela on monitor - observation at this time.    (1) PPROM : unknown etiology at this time, s/p CVS, triplet reduction of Triplet A - presenting triplet, WBC 18. With uterine cramping.    - Continue with observation given pre-viable state.   - No signs of abruption. Coags wnl. Hgb 11.   - No obvious signs of infection at this time: WBC 18. wet prep, UA negative. Urine Cx, UDS, GC/Chlam pending. GBS needs to be performed.   - Next SVE with fetal or maternal indication - to perform SSE  - repeat MFM U/S tomorrow to assess for fluid status around triplets/PPROM    (2) FWB: s/p reduction of Triplet A for unbalanced translocation on . Now with Di-Di pregnancy.   - MFM U/S on  reveals fetus B - male, anterior placenta, nml ANJEL, Fetus C - female with anterior placenta, nml ANJEL.  Fetus A - s/p reduction, male with posterior placenta, ANJEL 4.6cm - however on BSUS overnight - minimal fluid/anhydramnios around Fetus A - presumed PPROM/  - " balanced translocation in B, unknown status in C  - repeat MFM U/S tomorrow  - Denise WHITTEN    (3) PNC:  - Rh pos, no rhogam indicated  - Rubella immune, GCT not yet done      Scarlet Marshall MD MPH  OB-GYN PGY-2  06/18/17  10:14 AM      Physician Attestation   I, Christiane Lu, saw this patient with the resident and agree with the resident s findings and plan of care as documented in the resident s note.      I personally reviewed vital signs, medications, labs and tocometry.    Key findings: irregular mild contractions   - Discussed clinical scenario with Keith.  - Discussed formal ultrasound is needed to further investigate which membranes have ruptured. Discussed ultrasound with radiology today versus waiting for availability of M ultrasound tomorrow. They would like to wait until tomorrow at this time.  - Discussed possible ways for this scenario to evolve.    - A could deliver, followed by either delivery of B/C or continuation of B/C   - B or C could be ruptured, at which time consultation for previable PPROM will be conducted.    - Clinical infection could evolve, at which time delivery would be recommended regardless of viability.    - Discussed other possible variations of the scenario.    Keith demonstrate understanding. We will reassess clinically throughout the day and plan ultrasound and MFM consultation tomorrow morning. Discussed patient, reviewed imaging and labs with Dr. Shaista Lundberg [facility].     Christiane Lu  Date of Service (when I saw the patient): 06/18/17

## 2017-06-18 NOTE — PLAN OF CARE
Data: Patient presented to [facility] at 0419.   Reason for maternal/fetal assessment per patient is Rule out rupture of membranes  .  Patient is a . Prenatal record reviewed.      Obstetric History       T0      L0     SAB0   TAB0   Ectopic0   Multiple0   Live Births0       # Outcome Date GA Lbr Jose/2nd Weight Sex Delivery Anes PTL Lv   1 Current               . Medical history:   Past Medical History:   Diagnosis Date     NO ACTIVE PROBLEMS      Subclinical hypothyroidism 2016   . Gestational Age 18w5d. VSS. Fetal movement present. Patient denies backache, vaginal discharge, pelvic pressure, UTI symptoms, GI problems, edema, headache, visual disturbances, epigastric or URQ pain, abdominal pain. Support persons are present.  Action: Verbal consent for Doppler. Triage assessment completed. Uterine assessment by TOCO. Fetal assessment: Presumed adequate fetal oxygenation documented (see flow record).   Response: Dr. Allison informed of arrival. Plan per provider is admit pt. Patient verbalized agreement with plan. Patient transferred to room 460 ambulatory, oriented to room and call light.

## 2017-06-18 NOTE — PLAN OF CARE
"Problem: PROM, PPROM, Prolonged Rupture of Membranes (Adult,Obstetrics,Pediatric)  Goal: Signs and Symptoms of Listed Potential Problems Will be Absent or Manageable (PROM, PPROM, Prolonged Rupture of Membranes)  Signs and symptoms of listed potential problems will be absent or manageable by discharge/transition of care (reference PROM, PPROM, Prolonged Rupture of Membranes (Adult,Obstetrics,Pediatric) CPG).   Outcome: Therapy, progress toward functional goals as expected  Data: Afebrile. Leaking small amounts of clear/pinkish fluid throughout day. Contraction pattern throughout shift: every 2-6 minutes, lasting  seconds. Patient voices mild uterine cramping and \"GI discomfort\" and states she has not experienced increased discomfort throughout shift. Fetal assessment: FHR auscultated x2 via doptone at 1400. Signs and symptoms of infection absent.  Interventions: Monitor vital signs and indicators of infection every 4 hours while awake. Continue continuous uterine assessment via toco and daily doptones. Activity level: Bed rest with bathroom privileges. Preventive measures include Frequent voiding. Encourage active range of motion and frequent position changes.  Plan: Continue expectant management. Observe for and notify care provider of indicators of progressing labor, signs/symptoms of infection, or fetal/maternal compromise.      "

## 2017-06-19 ENCOUNTER — HOSPITAL ENCOUNTER (INPATIENT)
Dept: ULTRASOUND IMAGING | Facility: CLINIC | Age: 32
End: 2017-06-19
Attending: OBSTETRICS & GYNECOLOGY
Payer: COMMERCIAL

## 2017-06-19 PROBLEM — O36.4XX0 FETAL DEMISE BEFORE 22 WEEKS WITH RETENTION OF DEAD FETUS: Status: ACTIVE | Noted: 2017-06-19

## 2017-06-19 LAB
ALBUMIN UR-MCNC: NEGATIVE MG/DL
AMPHETAMINES UR QL SCN: NORMAL
APPEARANCE UR: CLEAR
BACTERIA #/AREA URNS HPF: ABNORMAL /HPF
BILIRUB UR QL STRIP: NEGATIVE
C TRACH DNA SPEC QL NAA+PROBE: NORMAL
CANNABINOIDS UR QL: NORMAL
COCAINE UR QL: NORMAL
COLOR UR AUTO: YELLOW
GLUCOSE UR STRIP-MCNC: NEGATIVE MG/DL
HGB UR QL STRIP: ABNORMAL
KETONES UR STRIP-MCNC: NEGATIVE MG/DL
LEUKOCYTE ESTERASE UR QL STRIP: ABNORMAL
N GONORRHOEA DNA SPEC QL NAA+PROBE: NORMAL
NITRATE UR QL: NEGATIVE
OPIATES UR QL SCN: NORMAL
PCP UR QL SCN: NORMAL
PH UR STRIP: 6 PH (ref 5–7)
RBC #/AREA URNS AUTO: 8 /HPF (ref 0–2)
SP GR UR STRIP: 1.01 (ref 1–1.03)
SPECIMEN SOURCE: NORMAL
SPECIMEN SOURCE: NORMAL
SQUAMOUS #/AREA URNS AUTO: 1 /HPF (ref 0–1)
URN SPEC COLLECT METH UR: ABNORMAL
UROBILINOGEN UR STRIP-MCNC: NORMAL MG/DL (ref 0–2)
WBC #/AREA URNS AUTO: 13 /HPF (ref 0–2)

## 2017-06-19 PROCEDURE — 80307 DRUG TEST PRSMV CHEM ANLYZR: CPT | Performed by: OBSTETRICS & GYNECOLOGY

## 2017-06-19 PROCEDURE — 87086 URINE CULTURE/COLONY COUNT: CPT | Performed by: OBSTETRICS & GYNECOLOGY

## 2017-06-19 PROCEDURE — 59899 UNLISTED PX MAT CARE&DLVR: CPT

## 2017-06-19 PROCEDURE — 00000159 ZZHCL STATISTIC H-SEND OUTS PREP: Performed by: OBSTETRICS & GYNECOLOGY

## 2017-06-19 PROCEDURE — 59821 TREATMENT OF MISCARRIAGE: CPT | Mod: GC | Performed by: OBSTETRICS & GYNECOLOGY

## 2017-06-19 PROCEDURE — 88300 SURGICAL PATH GROSS: CPT | Mod: 26 | Performed by: OBSTETRICS & GYNECOLOGY

## 2017-06-19 PROCEDURE — 25000128 H RX IP 250 OP 636: Performed by: OBSTETRICS & GYNECOLOGY

## 2017-06-19 PROCEDURE — 76815 OB US LIMITED FETUS(S): CPT

## 2017-06-19 PROCEDURE — 25000125 ZZHC RX 250: Performed by: OBSTETRICS & GYNECOLOGY

## 2017-06-19 PROCEDURE — 81001 URINALYSIS AUTO W/SCOPE: CPT | Performed by: OBSTETRICS & GYNECOLOGY

## 2017-06-19 PROCEDURE — 25000132 ZZH RX MED GY IP 250 OP 250 PS 637: Performed by: OBSTETRICS & GYNECOLOGY

## 2017-06-19 PROCEDURE — 12000030 ZZH R&B OB INTERMEDIATE UMMC

## 2017-06-19 PROCEDURE — 72200001 ZZH LABOR CARE VAGINAL DELIVERY SINGLE

## 2017-06-19 PROCEDURE — 10D17ZZ EXTRACTION OF PRODUCTS OF CONCEPTION, RETAINED, VIA NATURAL OR ARTIFICIAL OPENING: ICD-10-PCS | Performed by: OBSTETRICS & GYNECOLOGY

## 2017-06-19 PROCEDURE — 88300 SURGICAL PATH GROSS: CPT | Performed by: OBSTETRICS & GYNECOLOGY

## 2017-06-19 RX ORDER — ACETAMINOPHEN 325 MG/1
325-975 TABLET ORAL EVERY 4 HOURS PRN
Status: DISCONTINUED | OUTPATIENT
Start: 2017-06-19 | End: 2017-06-19

## 2017-06-19 RX ORDER — SIMETHICONE 80 MG
80-160 TABLET,CHEWABLE ORAL EVERY 4 HOURS PRN
Status: DISCONTINUED | OUTPATIENT
Start: 2017-06-19 | End: 2017-06-21

## 2017-06-19 RX ORDER — AMOXICILLIN 250 MG
1-2 CAPSULE ORAL 2 TIMES DAILY
Status: DISCONTINUED | OUTPATIENT
Start: 2017-06-19 | End: 2017-06-21

## 2017-06-19 RX ORDER — AZITHROMYCIN 250 MG/1
250 TABLET, FILM COATED ORAL DAILY
Status: DISCONTINUED | OUTPATIENT
Start: 2017-06-20 | End: 2017-06-21

## 2017-06-19 RX ORDER — AMPICILLIN 2 G/1
2 INJECTION, POWDER, FOR SOLUTION INTRAVENOUS EVERY 6 HOURS
Status: COMPLETED | OUTPATIENT
Start: 2017-06-19 | End: 2017-06-21

## 2017-06-19 RX ORDER — AZITHROMYCIN 250 MG/1
1000 TABLET, FILM COATED ORAL ONCE
Status: COMPLETED | OUTPATIENT
Start: 2017-06-19 | End: 2017-06-19

## 2017-06-19 RX ORDER — DIPHENHYDRAMINE HCL 25 MG
25 CAPSULE ORAL EVERY 6 HOURS PRN
Status: DISCONTINUED | OUTPATIENT
Start: 2017-06-19 | End: 2017-06-21

## 2017-06-19 RX ORDER — AMOXICILLIN 250 MG/1
250 CAPSULE ORAL 3 TIMES DAILY
Status: DISCONTINUED | OUTPATIENT
Start: 2017-06-21 | End: 2017-06-21

## 2017-06-19 RX ORDER — SODIUM CHLORIDE, SODIUM LACTATE, POTASSIUM CHLORIDE, CALCIUM CHLORIDE 600; 310; 30; 20 MG/100ML; MG/100ML; MG/100ML; MG/100ML
INJECTION, SOLUTION INTRAVENOUS CONTINUOUS
Status: DISCONTINUED | OUTPATIENT
Start: 2017-06-19 | End: 2017-06-21

## 2017-06-19 RX ORDER — PROCHLORPERAZINE 25 MG
25 SUPPOSITORY, RECTAL RECTAL EVERY 12 HOURS PRN
Status: DISCONTINUED | OUTPATIENT
Start: 2017-06-19 | End: 2017-06-21

## 2017-06-19 RX ORDER — ONDANSETRON 2 MG/ML
4 INJECTION INTRAMUSCULAR; INTRAVENOUS EVERY 6 HOURS PRN
Status: DISCONTINUED | OUTPATIENT
Start: 2017-06-19 | End: 2017-06-20

## 2017-06-19 RX ORDER — DIPHENHYDRAMINE HYDROCHLORIDE 50 MG/ML
25 INJECTION INTRAMUSCULAR; INTRAVENOUS EVERY 6 HOURS PRN
Status: DISCONTINUED | OUTPATIENT
Start: 2017-06-19 | End: 2017-06-21

## 2017-06-19 RX ADMIN — LEVOTHYROXINE SODIUM 50 MCG: 50 TABLET ORAL at 06:54

## 2017-06-19 RX ADMIN — AMPICILLIN SODIUM 2 G: 2 INJECTION, POWDER, FOR SOLUTION INTRAMUSCULAR; INTRAVENOUS at 17:10

## 2017-06-19 RX ADMIN — AMPICILLIN SODIUM 2 G: 2 INJECTION, POWDER, FOR SOLUTION INTRAMUSCULAR; INTRAVENOUS at 12:31

## 2017-06-19 RX ADMIN — AZITHROMYCIN 1000 MG: 250 TABLET, FILM COATED ORAL at 12:15

## 2017-06-19 RX ADMIN — AMPICILLIN SODIUM 2 G: 2 INJECTION, POWDER, FOR SOLUTION INTRAMUSCULAR; INTRAVENOUS at 22:45

## 2017-06-19 RX ADMIN — SODIUM CHLORIDE, POTASSIUM CHLORIDE, SODIUM LACTATE AND CALCIUM CHLORIDE 1000 ML: 600; 310; 30; 20 INJECTION, SOLUTION INTRAVENOUS at 12:16

## 2017-06-19 NOTE — OP NOTE
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 Gonzalez  Assistants: Dr. Rai Lundberg, Cookie Sequeira MD, MPH PGY4, Angelika Ferreira, PGY2, Edyta Arreola, MS3  Anesthesia: none  EBL: 2 mL   Urine Output: none  Fluids: none  Specimens: intact fetus and membranes  Complications: None apparent.  Findings: Examination revealed fetus in vagina, breech presentation, fetus removed intact manually. Speculum placed and trailing membranes noted extruding from cervical os, these were trimmed. Cervix then appeared closed with no membranes or other material at external os. Prominent, friable ectropion with some oozing, resolved with pressure held.   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 Imperforate anus, small umbilical cord stump noted, unable to determine number of cord vessels due to maceration and small size. Foul-odor and yellowish pink skin, mainly intact except for defect overlying right lateral chest. No evidence of microcephaly or micrognathia present. Mouth too small to insert digit to palpate cleft; however, appeared intact visually with no  defects.    Indications: Lolis Johnson is a 32 year old  female with tri-tri triplet pregnancy s/p reduction of fetus A due to unbalanced translocation who presented with leakage of fluid and was found to have PPROM of Fetus A. She was admitted and had cramping with subsequent passage of fetus A into vagina noted on US. Risks, benefits, and alternatives to the procedure were discussed. The patient's questions were answered, understanding confirmed, and the patient signed written informed consent. She & her  did not desire to see the fetus and did not desire autopsy or genetic testing as fetus previously known to have translocation.     Technique:  The patient was taken to the operating room where she was placed in the dorsal lithotomy position. She declined anesthesia for the procedure. The patient was prepped and draped in the usual sterile fashion. A sterile speculum was inserted into the vagina and the fetus was easily visualized within the vaginal vault. Speculum removed and the fetus was manual extracted in the breech position. Scissors used to cut the umbilical cord and the fetus was passed off to be sent to pathology. Speculum reinserted and prolapsing membranes noted extruding from external os ~ 3-4 cm. Sponge stick dipped in betadine x 2 used to clean cervix. A ring forceps was used to grasp the membranes (that appeared consistent with amnion tissue) and which were gently teased from cervix and cut flush with external os using scissors. No remaining material noted at os and the external cervical os appeared closed. The placenta was still in the uterus and was left in situ as instrumentation of the uterus was not completed in order to avoid harm to ongoing pregnancy. Cervix noted to be oozing from ectropion at 6 o'clock. Pressure held with sponge stick and oozing resolved. Betadine was then used to irrigate the vagina. The speculum was removed from the vagina.  The patient tolerated the procedure  well and was taken to the recovery room in stable condition.  Instrument, needle, and sponge counts were correct x2.  Dr. Gonzalez was present and scrubbed for the entire procedure. Dr. Lundberg present for end of procedure after delivery of the fetus.     Cookie Sequeira MD, MPH 6/19/2017 10:55 AM   OB/GYN Resident G4

## 2017-06-19 NOTE — PROGRESS NOTES
Antepartum Progress Note    Subjective: Lower abdominal cramping improved from last night, rated it 7/10 then, now 3-4/10, ocassional. No fevers or chills, pain with urination or odor to vaginal discharge. Notes slightly darker color of pink tinged fluid from vagina. Denies vaginal or pelvic pressure.     Objective:  Vitals:    17 1900 17 0030 17 0530 17 0750   BP: 107/59 93/50 105/54    Pulse:       Resp:    Temp:  98.7  F (37.1  C) 98.5  F (36.9  C) 97.7  F (36.5  C)   TempSrc:  Oral Oral Oral   Weight:       Height:         Gen: lying in bed, NAD  Abd: soft, nontender, fundus nontender  LE: nontender, no edema    FHR Doptones:  -Fetus A: s/p reduction  - Fetus B: 160s bpm yesterday PM  - Fetus C: 160s bpm yesterday PM  [place]: contractions q5-15min intermittently, irregular with stretches 1hr long without ctx    A/P:  Lolis Johnson is a 32 year old  at 18w6d here for PPROM with uterine cramping with Tri-tri pregnancy - now Di-Di pregnancy s/p reduction of Triplet A. U/s today suggestive of fetus A low in vagina or cervix. Given contractions and cramping, may be miscarrying fetus A. Will plan for speculum exam with removal of fetus A if visible and cervix open. Discussed risk of possible continued labor with miscarriage of remaining twins, cessation of contractions with monitoring in hospital, or intrauterine infection in which case indication would be for delivery of remaining fetuses.     #PPROM : unknown etiology at this time, s/p CVS, triplet reduction of Triplet A - presenting triplet, WBC 18. Mild uterine cramping.  - Plan for speculum exam with removal of fetus A if in process of miscarrying  - No signs of abruption. Coags wnl. Hgb 11.   - No obvious signs of infection at this time: WBC 18. wet prep, UA negative. Urine Cx, UDS, GC/Chlam pending.  - Bedside US reassuring for intact membranes of fetus B & C, MFM U/S today with fetus A low in cervix/vagina, full read  pending     #FWB: s/p reduction of Triplet A for unbalanced translocation on . Now with Di-Di pregnancy.   - MFM U/S on  reveals fetus B - male, anterior placenta, nml ANJEL, Fetus C - female with anterior placenta, nml ANJEL.  Fetus A - s/p reduction, male with posterior placenta, ANJEL 4.6cm - however on BSUS overnight - minimal fluid/anhydramnios around Fetus A - presumed PPROM, confirmed on formal US this AM  - balanced translocation in B, unknown status in C  - MFM U/S today, final read pending  - Denise WHITTEN     #PNC:  - Rh pos, no rhogam indicated  - Rubella immune, GCT not yet done    Dispo: Inpatient for continued monitoring for contractions and infection    Angelika Ferreira MD  Obstetrics and Gynecology, PGY-2  17 10:12 AM    Physician Attestation   I, Shaista Lundberg, saw this patient with the resident and agree with the resident s findings and plan of care as documented in the resident s note.      I personally reviewed vital signs, medications, labs, imaging and EFM.    Key findings: In summary, Lolis Johnson is a  at 18w6d with pregnancy complicated as outlined above.  At this point, US is highly suggestive of complete or partial delivery of fetus A, and Lolis is actually improving from a contraction standpoint.  Clinically, her picture is most consistent with delivery of fetus A and no signs/symptoms of imminent previable delivery of B and C.  She has no clinical features of chorioamnionitis at this time and therefore expectant management is very reasonable, as the couple are not interested in termination of the pregnancy.  Recommend SSE today and to determine if fetus A is completely or partially delivered and if partially delivered, recommend completing delivery manually if possible.  Copious irrigation with betadine afterwards is recommended followed by antibiotics to possibly prolong latency for surviving triplets (I would use antibiotics similar to PPROM latency antibiotics).  She  remains at very high risk for previable delivery of all of the fetuses.  We will perform a follow up US later this week as well to evaluate ANJEL and cervical length.      Shaista Lundberg  Date of Service (when I saw the patient): 06/19/17    Time Spent on this Encounter   I, Shaista Lundberg, spent a total of 15 minutes bedside and on the inpatient unit today managing the care of Lolis Johnson.  Over 50% of my time on the unit was spent counseling the patient and /or coordinating care regarding pregnancy complicated by tri-tri triplet gestation s/p SFR to di-di twins due to unbalanced translocation, now with PPROM and likely delivery of reduced fetus. See note for details.    Shaista Lundberg

## 2017-06-19 NOTE — PROGRESS NOTES
Afebrile, VSS.  Minimal abdominal cramping.  Continues to leak some pink fluid.  Dr. Lundberg has reviewed this morning's [facility] ultrasound and advises that the demised fetus appears to be in the vagina.   She advises speculum exam, with removal of the fetus (cutting umbilical cord if necessary).  Discussed with patient and , discussed risks/benefits, they agree to proceed.  Will go to OR so that sedation may be administered if necessary, at this time patient declines medication.

## 2017-06-19 NOTE — PLAN OF CARE
Problem: PROM, PPROM, Prolonged Rupture of Membranes (Adult,Obstetrics,Pediatric)  Goal: Signs and Symptoms of Listed Potential Problems Will be Absent or Manageable (PROM, PPROM, Prolonged Rupture of Membranes)  Signs and symptoms of listed potential problems will be absent or manageable by discharge/transition of care (reference PROM, PPROM, Prolonged Rupture of Membranes (Adult,Obstetrics,Pediatric) CPG).   Pt transferred from L&D to antepartum. Reports some mild cramping that is much improved from earlier. Cont to have pink d/c. Discussed freq of VS, abx, monitoring. Pt reports yeast infx with abx use and will speak to Dr. Gonzalez re: same. Mears to white board, unit. Pt enc to call with any back ache, utx tenderness, change in color or consistency of d/c, ctx, any type of pain. Mother and sister here providing supportive cares. Cont AP cares.

## 2017-06-19 NOTE — PROVIDER NOTIFICATION
Patient lying on side.  When on her side she does not feel any contractions and toco does not register any contractions.

## 2017-06-19 NOTE — PLAN OF CARE
Fetal remains to lab with required papers except forms #[id number] pages 2 and 3 which Dr Gonzalez will complete after 5 PM. Both sheets to go to lab after completion. Also the checklist form #[id number] when complete gets faxed with face sheet to [phone].

## 2017-06-19 NOTE — PLAN OF CARE
Patient mariela 2-5 min apart all shift.  Contractions do not appear to be getting stronger or closer together per patient report.  Patient remains afebrile.  Continues to leak small amounts of clear/pink fluid.  Patient denies vaginal bleeding, abdominal tenderness, or foul smelling discharge.  Will continue to monitor.

## 2017-06-19 NOTE — PLAN OF CARE
Problem: PROM, PPROM, Prolonged Rupture of Membranes (Adult,Obstetrics,Pediatric)  Goal: Signs and Symptoms of Listed Potential Problems Will be Absent or Manageable (PROM, PPROM, Prolonged Rupture of Membranes)  Signs and symptoms of listed potential problems will be absent or manageable by discharge/transition of care (reference PROM, PPROM, Prolonged Rupture of Membranes (Adult,Obstetrics,Pediatric) CPG).   Outcome: Improving  After MFM Ultrasound this morning fetus A was noted demised and in vagina. Dr Lundberg consulted with Dr Gonzalez recommending removal of fetus A with speculum. Dr Gonzalez requested procedure to be done in OB/OR 2 without need for anesthesia. The pt was consented for Removal of Demised Fetus from Vagina/Uterus. Pt to OR 2 ambulatory accompanied by , Pako. A time-out was preformed, perineal betadine prep done and the demised fetus was removed by Eyal Gonzalez and Hua. Pt and  prefer not to see fetus and are requesting hospital disposition. Jacqui Jones notified and will be in visit pt and .   Pt returned to room 460 via w/c with stable vitals, scant bleeding, FHT's doptoned at 155-160.   Plan: Transfer to antepartum room 404 and observe for signs of infection and start latency antibiotics as ordered.

## 2017-06-19 NOTE — PLAN OF CARE
Pt slept well during the night. She didn't notice any contractions during the night. She has noticed them since waking and getting up for the bathroom. The contractions she has noticed since waking are less painful than yesterday. She also noticed pink tinged fluid on her pad this morning, which she feels is more than yesterday. Pt does not have any concerns at this time. Plan is for an ultrasound this morning.

## 2017-06-19 NOTE — PROGRESS NOTES
[facility]  MATERNAL CHILD HEALTH   SOCIAL WORK PROGRESS NOTE      DATA:     Lolis Johnson is a 32 year old, ,  woman who is currently inpatient on our PSCU. She is 18+5 weeks gestation with a triplet pregnancy conceived via IVF. Pt was admitted to our L&D unit over the weekend for concerns related to PPROM. Pt reported leaking fluid from 17. She is s/p selective reduction of Triplet A on 17 due to concerns for an unbalanced translocation in that baby.    This morning, Triplet A was found to be spontaneously progressing in labor and was delivered via interval delivery. This baby was stillborn. Staff are watching to make sure delivery does not proceed with the other two babies. Pt will remain on our PSCU for expectant management.     At this time, mom was undecided on disposition for Triplet A.     INTERVENTION:     This  reviewed the chart and coordinated with the health care team. This  met with the pt in the PSCU.This  introduced myself and my role as their Maternal-Child Health , including role and scope of practice. I met with the patient today to assess for needs, offer support, assess for coping and review hospital and community resources. Provided brief emotional support around the loss of Triplet A, and attempted to assess how mom was coping with the events of the past few weeks. Briefly discussed parent's options of hospital disposition vs individual disposition for triplet A. Provided my card and encouraged her to reach out if needs arise throughout her hospitalization .    ASSESSMENT:     Pt quiet, reserved with SW today, appearing overwhelmed with the events of the past few weeks. May need time to process herself prior to being able to utilize SW for support.     PLAN:     SW will check in with parents tomorrow.       Oralia Hammonds, [facility]   Social Worker  Maternal Child Health    Phone:  [phone]  Pager:  [phone]

## 2017-06-19 NOTE — PROGRESS NOTES
Lolis Johnson was seen for an US today, which is summarized below:    Indication  ========     Premature Rupture of Membranes (PPROM)  Triplet gestation ( Fetus 1 unbalanced translocation, reduced) Dichorionic, Diamniotic Twin gestation.    Method  ======    [facility] ANTEPARTUM inpatient exam, Transabdominal ultrasound examination.    Pregnancy  =========    Triplet pregnancy. Trichorionic-triamniotic. Number of fetuses: 3.    Dating  ======    Conception on: 2017  Conception: IVF  GA by conception 18 w + 6 d  RENETTA by conception: 2017  Assigned: Dating performed on 2017, based on the conception date  Assigned GA 18 w + 6 d  Assigned RENETTA: 2017    Fetus 1  ======    Cardiac activity: present.  Fetal movements: absent.  Presentation: transv, in cervical canal.  Placenta:  Placental site: posterior, thick dividing membrane.  Umbilical cord: Cord vessels: na.  Amniotic fluid: Amount of AF: Oligohydramnios. MVP 0.7 cm.    Fetus 2  ======    Cardiac activity: present.  bpm.  Fetal movements: visualized.  Presentation: cephalic, maternal right, mid.  Placenta:  Placental site: anterior, right, thick dividing membrane.  Umbilical cord: Cord vessels: previously studied.  Amniotic fluid: Amount of AF: normal. MVP 4.6 cm.    Fetus 3  ======    Cardiac activity: present.  bpm.  Fetal movements: visualized.  Presentation: breech, maternal left, superior.  Placenta:  Placental site: anterior, left, thick dividing membrane.  Umbilical cord: Cord vessels: previously studied.  Amniotic fluid: Amount of AF: normal. MVP 4.7 cm.    Fetus 1  ======    Fetal Biometry  Calculated by: Hadlock (BPD-HC-AC-FL)  MVP 0.7 cm    Fetus 2  ======    Fetal Biometry  Calculated by: Hadlock (BPD-HC-AC-FL)  MVP 4.6 cm   bpm    Fetus 3  ======    Fetal Biometry  Calculated by: Hadlock (BPD-HC-AC-FL)  MVP 4.7 cm   bpm    Fetus 2  ======    Gender: male.    Fetus 3  ======    Gender: female.    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 Severe oligohydramnios is noted around triplet A.  3) Triplet A is very low and appears to be either in the cervix or upper vagina.    Recommendation  ==============    We discussed the findings on today's ultrasound with the patient.    I reviewed with the couple that triplet A is likely either partially or completely delivered. Therefore I would recommend SSE and even digital  exam if indicated to determine exact location of this triplet. If triplet A is partially or completely delivered, recommend removing this fetus gently  from the cervix/vagina and cutting the umbilical cord as high as possible with the cervix/uterus and copiously irrigating the cervix/vagina with  betadine. Given that Lolis has a remaining twin gestational and triplet A has been demised for almost 2 weeks and ruptured for 4 days, I would  not consider her a candidate for cerclage if triplet A has delivered. I would however recommend a week of latency antibiotics and if stable after 1  week and cervix closed without signs of chorioamnionitis, she can be considered for outpatient management at that time. Lolis remains at very  high risk for previable birth at this time, which the couple understands. We will follow weekly with US at this time. Recommend weekly coags  at this time as well.    Return to primary provider for continued prenatal care.    Thank-you for the opportunity to participate in the care of this patient. If you have questions regarding today's evaluation or if we can be of  further service, please contact the [facility].    **Fetal anomalies may be present but not  detected**.    Shaista Lundberg

## 2017-06-19 NOTE — DISCHARGE SUMMARY
[facility] Discharge Summary    Lolis Johnson MRN# [id number]   Age: 32 year old YOB: 1985     Date of Admission:  2017  Date of Discharge:  17  Admitting Physician:  Catina Vallecillo MD  Discharge Physician:  Catina Vallecillo MD     Admit Dx:   - Intrauterine pregnancy at 18w5d   - Previable PPROM  - Trichorionic Triamniotic pregnancy  - s/p selective reduction of fetus 1 for unbalanced translocation    Discharge Dx:  - Same as above  - s/p   - previable delivery of fetus A, fetus B, and fetus C    Procedures:  - Serial obstetric ultrasound  -   - Manual placental extraction    Admit HPI/Labor Course:  Lolis Johnson is a 32 year old now . She was admitted on 17 at 18w5d, and likely had PPROM of fetus A on 17. Her pregnancy was complicated by trichorionic triamnoitic IVF pregnancy, with triplet A possessing an unbalanced translocation. She underwent selective reduction of Triplet A at outside hospital on 17. Upon admissions, she was initially expectantly managed. On 17 she reported cramping. Exam revealed passage of fetus A into vagina. She was taken to the operative room for removal of fetus A from the vagina and irrigation of the vagina with betadine. Fetus A placenta remained in situ. She was then started on latency antibiotics and expectantly managed with ongoing pregnancy of fetus B and fetus C. She remained afebrile without signs of progressing labor or chorioamnionitis until 2017 when she reported painful contractions. She received nitrous oxide and IV fentanyl for pain. She had PPROM of fetus B at 1825. She progressed spontaneously and had a  of fetus B (labeled Baby1 in delivery summary) at 1842 on 17, vertex presentation. The cord was clamped and cut and demised fetus placed on mother's abdomen. Time of death was time of delivery, with Apgar 0 and 0. The placenta remained in situ. She then continued  to labor and had a  of fetus C (labeled Baby2 in delivery summary) at 1856 on 17, en caul. Intact placenta C spontaneously delivered with fetus. Amniotomy was performed with clear fluid noted. The cord was clamped and cut. Time of death was time of delivery, with Apgar 0 and 0. Placenta B delivered with gentle expression at 1900. 800 mcg misoprostol was placed rectally. Placenta A remained in situ. She was expectantly managed initially, and then at 2120 due to ongoing bleeding, manual sweep was recommended. Manual sweep x2 manually removed the placenta, mostly intact with small portion retrieved on second sweep. IV ancef 2g given. The patient tolerated the procedure well.  as QBL was inaccurate. Fundus firm 5 cm below the umbilicus with minimal bleeding noted. The patient does not desire autopsy or pathology exam and plans on hospital disposal of remains.    Please see her Admission H&P and Delivery Summary for further details.    Postpartum Course:  Her postpartum course was uncomplicated. On PPD#1, she was meeting all of her postpartum goals and deemed stable for discharge. She was voiding without difficulty, tolerating a regular diet without nausea and vomiting, her pain was well controlled on oral pain medicines and her lochia was appropriate. Her hemoglobin prior to delivery was 10.7 and after delivery was 10.6. Her Rh status was positive, and Rhogam was not indicated.     Discharge Medications:   Lolis Johnson   Home Medication Instructions CHAY:[id number]    Printed on:17 1020   Medication Information                      calcium carbonate (OS-ANASTASIA 500 MG [place]. [place]) 1250 MG tablet  Take 1 tablet by mouth 2 times daily             cetirizine (ZYRTEC) 10 MG tablet  Take 10 mg by mouth daily             ferrous sulfate (SLO-FE) 142 (45 FE) MG TBCR  Take 142 mg by mouth daily             ibuprofen (ADVIL/MOTRIN) 400 MG tablet  Take 1-2 tablets (400-800 mg) by mouth every 6 hours as needed for  other (cramping)             levothyroxine (SYNTHROID/LEVOTHROID) 50 MCG tablet  Take 1 tablet (50 mcg) by mouth daily             Multiple Vitamins-Minerals (MULTIVITAMIN PO)               senna-docusate (SENOKOT-S;PERICOLACE) 8.6-50 MG per tablet  Take 1-2 tablets by mouth 2 times daily                 Discharge/Disposition:  Lolis Johnson was discharged to home in stable condition with the following instructions/medications:  1) Call for temperature > 100.4, bright red vaginal bleeding >1 pad an hour x 2 hours, foul smelling vaginal discharge, pain not controlled by usual oral pain meds, persistent nausea and vomiting not controlled on medications  2) She did not desire contraception  3) She was instructed to follow-up with her primary OB in 6 weeks for a routine postpartum visit and a 1 week mood check.    Lizeth Blanchard MD  OBGYN PGY3    Physician Attestation   I, Catina Vallecillo, saw and evaluated this patient prior to discharge.  I discussed the patient with the resident and agree with plan of care as documented in the resident note.      I personally reviewed vital signs, medications and labs.    I personally spent 20 minutes on discharge activities.    Catina Vallecillo  Date of Service (when I saw the patient): 06/22/17

## 2017-06-20 LAB
BACTERIA SPEC CULT: NORMAL
COPATH REPORT: NORMAL
Lab: NORMAL
MICRO REPORT STATUS: NORMAL
SPECIMEN SOURCE: NORMAL

## 2017-06-20 PROCEDURE — 25000132 ZZH RX MED GY IP 250 OP 250 PS 637: Performed by: STUDENT IN AN ORGANIZED HEALTH CARE EDUCATION/TRAINING PROGRAM

## 2017-06-20 PROCEDURE — 25000132 ZZH RX MED GY IP 250 OP 250 PS 637: Performed by: OBSTETRICS & GYNECOLOGY

## 2017-06-20 PROCEDURE — 12000030 ZZH R&B OB INTERMEDIATE UMMC

## 2017-06-20 PROCEDURE — 12000032 ZZH R&B OB CRITICAL UMMC

## 2017-06-20 PROCEDURE — 25000125 ZZHC RX 250: Performed by: OBSTETRICS & GYNECOLOGY

## 2017-06-20 PROCEDURE — 99231 SBSQ HOSP IP/OBS SF/LOW 25: CPT | Mod: 24 | Performed by: OBSTETRICS & GYNECOLOGY

## 2017-06-20 RX ORDER — MORPHINE SULFATE 10 MG/ML
10 INJECTION, SOLUTION INTRAMUSCULAR; INTRAVENOUS
Status: COMPLETED | OUTPATIENT
Start: 2017-06-20 | End: 2017-06-21

## 2017-06-20 RX ORDER — FENTANYL CITRATE 50 UG/ML
100 INJECTION, SOLUTION INTRAMUSCULAR; INTRAVENOUS
Status: COMPLETED | OUTPATIENT
Start: 2017-06-20 | End: 2017-06-20

## 2017-06-20 RX ORDER — CYCLOBENZAPRINE HCL 5 MG
5-10 TABLET ORAL 3 TIMES DAILY PRN
Status: DISCONTINUED | OUTPATIENT
Start: 2017-06-20 | End: 2017-06-21

## 2017-06-20 RX ORDER — HYDROXYZINE HYDROCHLORIDE 50 MG/1
100 TABLET, FILM COATED ORAL
Status: COMPLETED | OUTPATIENT
Start: 2017-06-20 | End: 2017-06-21

## 2017-06-20 RX ORDER — PRENATAL VIT/IRON FUM/FOLIC AC 27MG-0.8MG
1 TABLET ORAL DAILY
Status: DISCONTINUED | OUTPATIENT
Start: 2017-06-20 | End: 2017-06-21

## 2017-06-20 RX ADMIN — LEVOTHYROXINE SODIUM 50 MCG: 50 TABLET ORAL at 09:03

## 2017-06-20 RX ADMIN — AMPICILLIN SODIUM 2 G: 2 INJECTION, POWDER, FOR SOLUTION INTRAMUSCULAR; INTRAVENOUS at 05:08

## 2017-06-20 RX ADMIN — CYCLOBENZAPRINE HYDROCHLORIDE 5 MG: 5 TABLET, FILM COATED ORAL at 20:09

## 2017-06-20 RX ADMIN — CYCLOBENZAPRINE HYDROCHLORIDE 5 MG: 5 TABLET, FILM COATED ORAL at 20:59

## 2017-06-20 RX ADMIN — PRENATAL VIT W/ FE FUMARATE-FA TAB 27-0.8 MG 1 TABLET: 27-0.8 TAB at 20:12

## 2017-06-20 RX ADMIN — AMPICILLIN SODIUM 2 G: 2 INJECTION, POWDER, FOR SOLUTION INTRAMUSCULAR; INTRAVENOUS at 22:28

## 2017-06-20 RX ADMIN — AMPICILLIN SODIUM 2 G: 2 INJECTION, POWDER, FOR SOLUTION INTRAMUSCULAR; INTRAVENOUS at 10:51

## 2017-06-20 RX ADMIN — HYDROXYZINE HYDROCHLORIDE 100 MG: 50 TABLET, FILM COATED ORAL at 21:01

## 2017-06-20 RX ADMIN — AMPICILLIN SODIUM 2 G: 2 INJECTION, POWDER, FOR SOLUTION INTRAMUSCULAR; INTRAVENOUS at 17:00

## 2017-06-20 RX ADMIN — AZITHROMYCIN 250 MG: 250 TABLET, FILM COATED ORAL at 10:50

## 2017-06-20 RX ADMIN — FENTANYL CITRATE 100 MCG: 50 INJECTION, SOLUTION INTRAMUSCULAR; INTRAVENOUS at 01:02

## 2017-06-20 NOTE — PLAN OF CARE
Pt. States no new concerns at this time.  She is going to go back to sleep until rounds.  Got her fresh water and told her to alert the nurse with any changes no matter how small they may be.

## 2017-06-20 NOTE — PROGRESS NOTES
Antepartum Progress Note    Subjective: Was transferred to L&D early this morning due to increasing discomfort with cervix visually dilated to 3 cm on speculum exam. Improved cramping this morning compared to last night. Still feeling contractions every 5-10 minutes. Describes contractions as mild, like a feeling of a gas bubble in her abdomen that comes and goes. Having some pink tinged discharge. Denies heavy vaginal bleeding.   She has not started feeling fetal mvmt yet.  She denies obvious LOF.        Objective:  Vitals:    17 1944 17 2337 17 0230 17 0530   BP: 107/66 99/57  94/57   Pulse:  95     Resp: 16 16     Temp: 98.6  F (37  C) 98.4  F (36.9  C) 98.4  F (36.9  C) 98.3  F (36.8  C)   TempSrc: Oral Oral Oral Oral   Weight:       Height:         Gen: lying in bed, NAD  Abd: soft, nontender, fundus nontender  Ex: SCD's in place, no calf tenderness    BSUS: twin gestation with grossly normal fluid for both babies.  +FM x 2 and FCA x 2    FHR Doptones:  -Fetus A: s/p reduction  - Fetus B: 160s bpm yesterday PM  - Fetus C: 160s bpm yesterday PM  [place]: contractions q5-15min intermittently, irregular with stretches 1hr long without ctx    A/P:  Lolis Johnson is a 32 year old  at 19w0d here for PPROM of baby A with uterine cramping in Tri-tri pregnancy - now Di-Di pregnancy s/p reduction of Triplet A.  S/p vaginal removal of triplet A after being found in vagina.  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     #PPROM : unknown etiology at this time, s/p CVS, triplet reduction of Triplet A - which was presenting triplet, now removed.     - Currently on latency Abx per MFM recommendations given rom of triplet A, even with likely intact membranes of B, C.  - Ctx overnight, but have significantly decreased in frequency and strength, now minimal felt, able to sleep through.    #FWB: s/p reduction of Triplet A for unbalanced translocation on . Now with Di-Di pregnancy.   - MFM U/S on   reveals fetus B - male, anterior placenta, nml ANJEL, Fetus C - female with anterior placenta, nml ANJEL. - balanced translocation in B, unknown status in C  - MFM U/S today, final read pending  - Denise TID     #PNC:  - Rh pos, no rhogam indicated  - Rubella immune, GCT not yet done    Dispo: Inpatient for continued monitoring for contractions and infection.    Angelika Ferreira MD  Obstetrics and Gynecology, PGY-2  06/20/17 8:51 AM    Patient seen and examined by me, agree with above resident note.    HOMER ALMANZA MD

## 2017-06-20 NOTE — PROGRESS NOTES
"[facility]  MATERNAL CHILD HEALTH   SOCIAL WORK PROGRESS NOTE      DATA:     Pt transferred to L&D early this morning due to progression of cervical dilation. She is now dilated to a 3. Pt's , Pako, here for support today.     Pt and  report they feel as though they are coping as well as possible, and have good family support during this time. They report that they struggled with infertility for 2 years prior to getting pregnant, so these new complications are part of this longstanding journey to have children. Pt reports she is generally very \"even tempered\" and calm through most upsets. Pako reports he tends to be more anxious, but was able to sleep and is not focusing too much on what may or may not happen in the future. Both pt and her  report their focus is on the present and staying pregnant, and trying not to worry about impending losses until the doctors tell her otherwise.     Pt and  deny any unmet needs and endorse good communication with the health care team.    INTERVENTION:     Chart review. Coordinated with the interdisciplinary team regarding this pt's progress. Met with pt and her  to assess for needs. Provided emotional support and counseling around all of the unknowns in this pregnancy.     ASSESSMENT:     No unmet needs at this time. Family feels well supported and endorses good communication with the health care team.     PLAN:     SW continue to follow as needs arise.       Oralia Hammonds [facility]   Social Worker  Maternal Child Health    Phone: [phone]  Pager:  [phone]    "

## 2017-06-20 NOTE — PROGRESS NOTES
"SPIRITUAL HEALTH SERVICES  SPIRITUAL ASSESSMENT Progress Note  [facility] ([facility] [place]) Labor and Delivery    PRIMARY FOCUS:     Goals of care    Emotional/spiritual/Jewish distress    Support for coping    ILLNESS CIRCUMSTANCES:   Responded to staff referral. Reviewed documentation. Reflective conversation shared with pt Lolis and her  Pako.    Context of Serious Illness/Symptom(s) - Loils had a triplet pregnancy and delivered baby A yesterday. She moved to L&D this morning for increasing cervix dilation and discomfort. She is at 18w6d with pprom.    Resources for Support -  Pako, family, friends, neighbors.      DISTRESS:     Emotional/Existential/Relational Distress - Lolis and Pako reflected on the events since Lolis's admission to the hospital on Sunday. They talked about feeling tired and worried about the two remaining babies. Pako was tearful as he endorsed a feeling of \"helplessness because there's nothing I can do and so much we don't know.\"  Lolis remained more calm in demeanor and said that she is trying to \"stay calm\" because \"it's not good for me for the babies for me to get too anxious.\"    Spiritual/Latter day Distress - Not discussed    Social/Cultural/Economic Distress - Not discussed.     SPIRITUAL/Jain COPING:     Presybeterian/Lilliana - None. Lolis and Pako said they do not have any Jewish or spiritual tradition.     Spiritual Practice(s) - Lolis said that she antonio by \"resting, closing my eyes and breathing deeply.\"  She described this type of gentle meditation as helpful for \"clearing my mind and processing things\" In contrast, Pako said that as an extravert \"it helps me to be with people and to be doing things,\" including simple actions like household tasks. Pako said that engaging with his friends and family and \"keeping busy\" help him \"clear my mind.\"    Emotional/Existential/Relational Connections -   o Lolis and Pako were thoughtful and reflective about their different ways of coping and " "named that making decisions together has always been a challenging part of their relationship because they have different ways of making decisions and voicing opinions.   o They said their families are supporting them, taking care of their cat and dog, etc. They have neighbors who have offered to help by mowing the lawn or other tasks.     GOALS OF CARE:    Goals of Care - Yosvany are hopeful about the pregnancy and the monitoring today. They are aware of the uncertainty and the spectrum of things that might happen today, but they hope that Lolis can stay pregnant with both babies and move bake to antepartum to continue the pregnancy.     Meaning/Sense-Making - Lolis and Pako said they are trying to \"take things one thing at a time.\"    PLAN: Will continue to follow up with Lolis and Pako during Lolis's hospital admission.     Ashlyn Baird M.Div.  Associate   Pager [phone]    "

## 2017-06-20 NOTE — PLAN OF CARE
Problem: Prenatal Inpatient (Adult,Obstetrics,Pediatric)  Goal: Signs and Symptoms of Listed Potential Problems Will be Absent or Manageable (Prenatal Inpatient)  Signs and symptoms of listed potential problems will be absent or manageable by discharge/transition of care (reference Prenatal Inpatient (Adult,Obstetrics,Pediatric) CPG).   Outcome: No Change  Data: Contraction pattern stable and within parameters. Pt ctx every 3-5 min on monitor this AM, but stated that she is only feeling them about every 10 minutes. After bedside discussion with Dr. Arevalo and Dr. Ferreira, it was determined that pt no longer needed to be on continuous toco. Pt instructed to call RN if she is starting to feel more consistent/frequent/painful ctx. Fetal assessment evaluated via bedside u/s this afternoon, FHT noted X2. Fetal activity noted X2..  Interventions: Continue uterine/fetal assessment PRN. Activity level:Bed rest with bathroom privileges, and preventive measures including Medications, Positioning and Frequent voiding. Encourage active range of motion and frequent position changes.  Plan: Continue expectant management. Observe for and notify care provider of indications of progressing labor or signs of fetal/maternal compromise. Pt transferred via wheelchair to room 423. Report given to EDE Harrison RN. Pt family accompanied pt to new room.

## 2017-06-20 NOTE — PROGRESS NOTES
Brief OB Progress Note    Called to room for contractions. Pt reports painful ctx q3-5 minutes, increased pain compared to day of admission. Denies fevers, chills, VB. Ongoing small LOF, which she has had all day.    Vitals:    06/19/17 1553 06/19/17 1943 06/19/17 1944 06/19/17 2337   BP: 101/58  107/66 99/57   Pulse:    95   Resp: 16  16 16   Temp: 97.4  F (36.3  C) 98.1  F (36.7  C) 98.6  F (37  C) 98.4  F (36.9  C)   TempSrc: Oral Oral Oral Oral   Weight:       Height:         Gen: NAD, a/o  CV: RRR  Abd: Nontender fundus, contractions palpate firm  SSE: Normal external genitalia, cervix 1cm visually, small amount of blood. Not malodorous.    A/P:  - 500cc bolus  - Repeat SVE in 30min-1 hour, transfer if cervical change  - No fundal tenderness or tachycardia, does not meet criteria for chorioamnionitis at this time  - Discussed with Dr. Lundberg, continue expectant mgmt. No indication for tocolysis, previable with recent PPROM and interval delivery of twin A.  - Discussed with Dr. Carlos Lino MD  OB GYN PGY-3

## 2017-06-20 NOTE — PLAN OF CARE
Problem: Goal Outcome Summary  Goal: Goal Outcome Summary  Outcome: No Change  Happy to see both babies on ultrasound and heartbeats. Continue on latency antibiotics. Stable.

## 2017-06-20 NOTE — PROVIDER NOTIFICATION
06/20/17 0145   Provider Notification   Provider Name/Title Dr. Lino   Method of Notification At Bedside   Notification Reason Other (Comment)   Speculum exam by Dr. Lino shows cervix is 3. Transferring to labor and delivery.

## 2017-06-20 NOTE — PROGRESS NOTES
Brief OB Progress Note    In to reassess patient. Comfortable after fentanyl IV x1.     Vitals:    06/19/17 1553 06/19/17 1943 06/19/17 1944 06/19/17 2337   BP: 101/58  107/66 99/57   Pulse:    95   Resp: 16  16 16   Temp: 97.4  F (36.3  C) 98.1  F (36.7  C) 98.6  F (37  C) 98.4  F (36.9  C)   TempSrc: Oral Oral Oral Oral   Weight:       Height:         Gen: NAD, a/o  CV: RRR  Abd: Nontender fundus, contractions palpate firm  SSE: Normal external genitalia, cervix 3cm visually, see what appears to be membrane vs. fetal head through os    A/P:  - Transfer to L&D  - Continue expectant management, low threshold for repeat SSE  - No indications for tocolysis or induction, no s/s of chorioamnionitis at this time    Mame Lino MD  OB GYN PGY-3

## 2017-06-20 NOTE — PROVIDER NOTIFICATION
06/20/17 0000   Provider Notification   Provider Name/Title Dr. Zabala   Method of Notification At Bedside   Request Evaluate in Person   Notification Reason Uterine Activity

## 2017-06-20 NOTE — PROVIDER NOTIFICATION
06/20/17 1328   Provider Notification   Provider Name/Title EDE Harrison RN   Method of Notification Phone   Request Evaluate in Person   Notification Reason Other (Comment)  (report)   RN report given via phone prior to antepartum transfer.

## 2017-06-20 NOTE — PROVIDER NOTIFICATION
06/19/17 2000   Provider Notification   Provider Name/Title Dr. Tsai   Method of Notification Electronic Page   Request Evaluate in Person   Notification Reason Other (Comment)   Can only find 1 heartbeat. May be picking up mom's pulse for the other. Please do ultrasound. Thanks.

## 2017-06-21 PROBLEM — O42.112 PRETERM PREMATURE RUPTURE OF MEMBRANES (PPROM) WITH ONSET OF LABOR AFTER 24 HOURS OF RUPTURE IN SECOND TRIMESTER, ANTEPARTUM: Status: ACTIVE | Noted: 2017-06-21

## 2017-06-21 PROBLEM — O03.9 SPONTANEOUS ABORTION IN SECOND TRIMESTER: Status: ACTIVE | Noted: 2017-06-21

## 2017-06-21 LAB
ERYTHROCYTE [DISTWIDTH] IN BLOOD BY AUTOMATED COUNT: 12.6 % (ref 10–15)
HCT VFR BLD AUTO: 32.4 % (ref 35–47)
HGB BLD-MCNC: 10.7 G/DL (ref 11.7–15.7)
MCH RBC QN AUTO: 31.7 PG (ref 26.5–33)
MCHC RBC AUTO-ENTMCNC: 33 G/DL (ref 31.5–36.5)
MCV RBC AUTO: 96 FL (ref 78–100)
PLATELET # BLD AUTO: 235 10E9/L (ref 150–450)
RBC # BLD AUTO: 3.38 10E12/L (ref 3.8–5.2)
WBC # BLD AUTO: 15.8 10E9/L (ref 4–11)

## 2017-06-21 PROCEDURE — 85027 COMPLETE CBC AUTOMATED: CPT | Performed by: OBSTETRICS & GYNECOLOGY

## 2017-06-21 PROCEDURE — 25000125 ZZHC RX 250: Performed by: OBSTETRICS & GYNECOLOGY

## 2017-06-21 PROCEDURE — 25000132 ZZH RX MED GY IP 250 OP 250 PS 637: Performed by: OBSTETRICS & GYNECOLOGY

## 2017-06-21 PROCEDURE — 00000159 ZZHCL STATISTIC H-SEND OUTS PREP: Performed by: OBSTETRICS & GYNECOLOGY

## 2017-06-21 PROCEDURE — 88300 SURGICAL PATH GROSS: CPT | Mod: 26 | Performed by: OBSTETRICS & GYNECOLOGY

## 2017-06-21 PROCEDURE — 99233 SBSQ HOSP IP/OBS HIGH 50: CPT | Mod: 24 | Performed by: OBSTETRICS & GYNECOLOGY

## 2017-06-21 PROCEDURE — 25000128 H RX IP 250 OP 636: Performed by: OBSTETRICS & GYNECOLOGY

## 2017-06-21 PROCEDURE — 25000132 ZZH RX MED GY IP 250 OP 250 PS 637

## 2017-06-21 PROCEDURE — 10D17ZZ EXTRACTION OF PRODUCTS OF CONCEPTION, RETAINED, VIA NATURAL OR ARTIFICIAL OPENING: ICD-10-PCS | Performed by: OBSTETRICS & GYNECOLOGY

## 2017-06-21 PROCEDURE — 36415 COLL VENOUS BLD VENIPUNCTURE: CPT | Performed by: OBSTETRICS & GYNECOLOGY

## 2017-06-21 PROCEDURE — 88305 TISSUE EXAM BY PATHOLOGIST: CPT | Performed by: OBSTETRICS & GYNECOLOGY

## 2017-06-21 PROCEDURE — 88305 TISSUE EXAM BY PATHOLOGIST: CPT | Mod: 26 | Performed by: OBSTETRICS & GYNECOLOGY

## 2017-06-21 PROCEDURE — 88300 SURGICAL PATH GROSS: CPT | Performed by: OBSTETRICS & GYNECOLOGY

## 2017-06-21 PROCEDURE — 12000030 ZZH R&B OB INTERMEDIATE UMMC

## 2017-06-21 PROCEDURE — 72200000 ZZH LABOR CARE VAGINAL DELIVERY MULT

## 2017-06-21 RX ORDER — MISOPROSTOL 200 UG/1
400 TABLET ORAL
Status: DISCONTINUED | OUTPATIENT
Start: 2017-06-21 | End: 2017-06-21

## 2017-06-21 RX ORDER — METHYLERGONOVINE MALEATE 0.2 MG/ML
200 INJECTION INTRAVENOUS
Status: DISCONTINUED | OUTPATIENT
Start: 2017-06-21 | End: 2017-06-21

## 2017-06-21 RX ORDER — IBUPROFEN 400 MG/1
400-800 TABLET, FILM COATED ORAL EVERY 6 HOURS PRN
Status: DISCONTINUED | OUTPATIENT
Start: 2017-06-21 | End: 2017-06-21

## 2017-06-21 RX ORDER — AMOXICILLIN 250 MG
1-2 CAPSULE ORAL 2 TIMES DAILY
Status: DISCONTINUED | OUTPATIENT
Start: 2017-06-21 | End: 2017-06-21

## 2017-06-21 RX ORDER — AMOXICILLIN 250 MG
1-2 CAPSULE ORAL 2 TIMES DAILY
Status: DISCONTINUED | OUTPATIENT
Start: 2017-06-21 | End: 2017-06-22 | Stop reason: HOSPADM

## 2017-06-21 RX ORDER — OXYTOCIN 10 [USP'U]/ML
10 INJECTION, SOLUTION INTRAMUSCULAR; INTRAVENOUS
Status: DISCONTINUED | OUTPATIENT
Start: 2017-06-21 | End: 2017-06-21

## 2017-06-21 RX ORDER — OXYTOCIN 10 [USP'U]/ML
INJECTION, SOLUTION INTRAMUSCULAR; INTRAVENOUS
Status: DISPENSED
Start: 2017-06-21 | End: 2017-06-22

## 2017-06-21 RX ORDER — NALOXONE HYDROCHLORIDE 0.4 MG/ML
.1-.4 INJECTION, SOLUTION INTRAMUSCULAR; INTRAVENOUS; SUBCUTANEOUS
Status: DISCONTINUED | OUTPATIENT
Start: 2017-06-21 | End: 2017-06-21

## 2017-06-21 RX ORDER — OXYTOCIN/0.9 % SODIUM CHLORIDE 30/500 ML
340 PLASTIC BAG, INJECTION (ML) INTRAVENOUS CONTINUOUS PRN
Status: DISCONTINUED | OUTPATIENT
Start: 2017-06-21 | End: 2017-06-21

## 2017-06-21 RX ORDER — HYDROCORTISONE 2.5 %
CREAM (GRAM) TOPICAL 3 TIMES DAILY PRN
Status: DISCONTINUED | OUTPATIENT
Start: 2017-06-21 | End: 2017-06-22 | Stop reason: HOSPADM

## 2017-06-21 RX ORDER — MISOPROSTOL 200 UG/1
400 TABLET ORAL
Status: DISCONTINUED | OUTPATIENT
Start: 2017-06-21 | End: 2017-06-22 | Stop reason: HOSPADM

## 2017-06-21 RX ORDER — OXYTOCIN/0.9 % SODIUM CHLORIDE 30/500 ML
100 PLASTIC BAG, INJECTION (ML) INTRAVENOUS CONTINUOUS
Status: DISCONTINUED | OUTPATIENT
Start: 2017-06-21 | End: 2017-06-21

## 2017-06-21 RX ORDER — CARBOPROST TROMETHAMINE 250 UG/ML
250 INJECTION, SOLUTION INTRAMUSCULAR
Status: DISCONTINUED | OUTPATIENT
Start: 2017-06-21 | End: 2017-06-21

## 2017-06-21 RX ORDER — OXYTOCIN/0.9 % SODIUM CHLORIDE 30/500 ML
340 PLASTIC BAG, INJECTION (ML) INTRAVENOUS CONTINUOUS PRN
Status: DISCONTINUED | OUTPATIENT
Start: 2017-06-21 | End: 2017-06-22 | Stop reason: HOSPADM

## 2017-06-21 RX ORDER — MISOPROSTOL 200 UG/1
800 TABLET ORAL
Status: DISCONTINUED | OUTPATIENT
Start: 2017-06-21 | End: 2017-06-21

## 2017-06-21 RX ORDER — CEFAZOLIN SODIUM 2 G/100ML
2 INJECTION, SOLUTION INTRAVENOUS ONCE
Status: COMPLETED | OUTPATIENT
Start: 2017-06-21 | End: 2017-06-21

## 2017-06-21 RX ORDER — LANOLIN 100 %
OINTMENT (GRAM) TOPICAL
Status: DISCONTINUED | OUTPATIENT
Start: 2017-06-21 | End: 2017-06-21

## 2017-06-21 RX ORDER — HYDROCORTISONE 2.5 %
CREAM (GRAM) TOPICAL 3 TIMES DAILY PRN
Status: DISCONTINUED | OUTPATIENT
Start: 2017-06-21 | End: 2017-06-21

## 2017-06-21 RX ORDER — LANOLIN 100 %
OINTMENT (GRAM) TOPICAL
Status: DISCONTINUED | OUTPATIENT
Start: 2017-06-21 | End: 2017-06-22 | Stop reason: HOSPADM

## 2017-06-21 RX ORDER — BISACODYL 10 MG
10 SUPPOSITORY, RECTAL RECTAL DAILY PRN
Status: DISCONTINUED | OUTPATIENT
Start: 2017-06-23 | End: 2017-06-22 | Stop reason: HOSPADM

## 2017-06-21 RX ORDER — ACETAMINOPHEN 325 MG/1
650 TABLET ORAL EVERY 4 HOURS PRN
Status: DISCONTINUED | OUTPATIENT
Start: 2017-06-21 | End: 2017-06-21

## 2017-06-21 RX ORDER — OXYTOCIN/0.9 % SODIUM CHLORIDE 30/500 ML
PLASTIC BAG, INJECTION (ML) INTRAVENOUS
Status: DISPENSED
Start: 2017-06-21 | End: 2017-06-22

## 2017-06-21 RX ORDER — OXYCODONE HYDROCHLORIDE 5 MG/1
5-10 TABLET ORAL
Status: DISCONTINUED | OUTPATIENT
Start: 2017-06-21 | End: 2017-06-22 | Stop reason: HOSPADM

## 2017-06-21 RX ORDER — NALOXONE HYDROCHLORIDE 0.4 MG/ML
.1-.4 INJECTION, SOLUTION INTRAMUSCULAR; INTRAVENOUS; SUBCUTANEOUS
Status: DISCONTINUED | OUTPATIENT
Start: 2017-06-21 | End: 2017-06-22 | Stop reason: HOSPADM

## 2017-06-21 RX ORDER — HYDROXYZINE HYDROCHLORIDE 50 MG/1
100 TABLET, FILM COATED ORAL
Status: DISCONTINUED | OUTPATIENT
Start: 2017-06-21 | End: 2017-06-22 | Stop reason: HOSPADM

## 2017-06-21 RX ORDER — BISACODYL 10 MG
10 SUPPOSITORY, RECTAL RECTAL DAILY PRN
Status: DISCONTINUED | OUTPATIENT
Start: 2017-06-23 | End: 2017-06-21

## 2017-06-21 RX ORDER — FENTANYL CITRATE 50 UG/ML
100 INJECTION, SOLUTION INTRAMUSCULAR; INTRAVENOUS
Status: COMPLETED | OUTPATIENT
Start: 2017-06-21 | End: 2017-06-21

## 2017-06-21 RX ORDER — IBUPROFEN 400 MG/1
400-800 TABLET, FILM COATED ORAL EVERY 6 HOURS PRN
Status: DISCONTINUED | OUTPATIENT
Start: 2017-06-21 | End: 2017-06-22 | Stop reason: HOSPADM

## 2017-06-21 RX ORDER — ACETAMINOPHEN 325 MG/1
650 TABLET ORAL EVERY 4 HOURS PRN
Status: DISCONTINUED | OUTPATIENT
Start: 2017-06-21 | End: 2017-06-22 | Stop reason: HOSPADM

## 2017-06-21 RX ORDER — OXYTOCIN 10 [USP'U]/ML
10 INJECTION, SOLUTION INTRAMUSCULAR; INTRAVENOUS
Status: DISCONTINUED | OUTPATIENT
Start: 2017-06-21 | End: 2017-06-22 | Stop reason: HOSPADM

## 2017-06-21 RX ORDER — LIDOCAINE HYDROCHLORIDE 10 MG/ML
INJECTION, SOLUTION EPIDURAL; INFILTRATION; INTRACAUDAL; PERINEURAL
Status: DISPENSED
Start: 2017-06-21 | End: 2017-06-22

## 2017-06-21 RX ADMIN — AZITHROMYCIN 250 MG: 250 TABLET, FILM COATED ORAL at 08:53

## 2017-06-21 RX ADMIN — LEVOTHYROXINE SODIUM 50 MCG: 50 TABLET ORAL at 08:53

## 2017-06-21 RX ADMIN — AMOXICILLIN 250 MG: 250 CAPSULE ORAL at 16:50

## 2017-06-21 RX ADMIN — AMPICILLIN SODIUM 2 G: 2 INJECTION, POWDER, FOR SOLUTION INTRAMUSCULAR; INTRAVENOUS at 05:08

## 2017-06-21 RX ADMIN — FENTANYL CITRATE 100 MCG: 50 INJECTION, SOLUTION INTRAMUSCULAR; INTRAVENOUS at 18:40

## 2017-06-21 RX ADMIN — MISOPROSTOL 800 MCG: 200 TABLET ORAL at 19:14

## 2017-06-21 RX ADMIN — AMOXICILLIN 250 MG: 250 CAPSULE ORAL at 10:42

## 2017-06-21 RX ADMIN — OXYCODONE HYDROCHLORIDE 10 MG: 5 TABLET ORAL at 22:38

## 2017-06-21 RX ADMIN — IBUPROFEN 800 MG: 400 TABLET ORAL at 19:58

## 2017-06-21 RX ADMIN — CEFAZOLIN SODIUM 2 G: 2 INJECTION, SOLUTION INTRAVENOUS at 23:11

## 2017-06-21 RX ADMIN — MORPHINE SULFATE 10 MG: 10 INJECTION, SOLUTION INTRAMUSCULAR; INTRAVENOUS at 00:32

## 2017-06-21 RX ADMIN — HYDROXYZINE HYDROCHLORIDE 100 MG: 50 TABLET, FILM COATED ORAL at 17:02

## 2017-06-21 NOTE — PROGRESS NOTES
Antepartum Progress Note    Subjective: Patient reports that she continues to feel strong uncomfortable cramping contractions.  She continues to have pink vaginal discharge with a pus-like texture.  She feels that her contractions now feel similar to last night, but not quite and strong and not as frequent.        Objective:  Vitals:    17 2230 17 2332 17 0325 17 0900   BP: 100/61  101/57 95/61   Pulse:    91   Resp: 18 18 16 18   Temp: 98.6  F (37  C) 98.5  F (36.9  C) 98.1  F (36.7  C) 98.2  F (36.8  C)   TempSrc: Oral Oral Oral Oral   Weight:       Height:         Gen: lying in bed, appears uncomfortable during contractions  Abd: soft, nontender, fundus non-tender   Ex: SCD's in place, no calf tenderness    FHR Doptones:  -Fetus A: s/p reduction  - Fetus B: 160 bpm  - Fetus C: 155 bpm      A/P:  Lolis Johnson is a 32 year old  at 19w0d HD#4 PPROM of baby A, now post delivery day 2 s/p vaginal sweep to remove triplet A after being found in vagina.  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  Tri-tri pregnancy - now Di-Di pregnancy s/p reduction of Triplet A.      #PPROM : s/p CVS, triplet reduction of Triplet A - which was presenting triplet, now removed.   Patient continues to have uncomfortable contractions.  Will continue to monitor and orally hydrate for 1-2 hours.  If contractions persist and remain strong, then plan to transfer back to Labor and delivery.    - Currently on latency Abx per MFM recommendations given rupture of membranes of triplet A. Likely intact membranes of B, C.  -repeat CBC ordered to assess WBC  -Clear liquid diet now in case of progressing into labor    #FWB: s/p reduction of Triplet A for unbalanced translocation on . Now with Di-Di pregnancy.   - MFM U/S on  reveals fetus B - male, anterior placenta, nml ANJEL, Fetus C - female with anterior placenta, nml ANJEL. - balanced translocation in B, unknown status in C  - Doptones and toco TID    #PNC:  - Rh  pos, no rhogam indicated  - Rubella immune, GCT not yet done    Dispo: Inpatient for continued monitoring for contractions and infection, possible transfer to Labor and Delivery if continuing to contract.     Lizeth Blanchard MD  OBMARYN PGY2

## 2017-06-21 NOTE — PROVIDER NOTIFICATION
06/20/17 2248   Provider Notification   Provider Name/Title Dr. Lino   Method of Notification Electronic Page   Request Evaluate - Remote   Notification Reason Status Update   Legs cramps better, but continues to have abdominal cramping, which is preventing her from sleeping. Afebrile.

## 2017-06-21 NOTE — PROVIDER NOTIFICATION
06/21/17 0000   Provider Notification   Provider Name/Title Dr. Lino   Method of Notification Phone   Request Evaluate - Remote   Notification Reason Pain;Status Update   Dr. Lino on the phone for status update on pain control. Patient complaints of abdominal cramping that radiates to upper thighs. Informed provider that patient was given hot packs and resting. Patient will call if pain is worse and prn's can be given.

## 2017-06-21 NOTE — PLAN OF CARE
Problem: Prenatal Inpatient (Adult,Obstetrics,Pediatric)  Goal: Signs and Symptoms of Listed Potential Problems Will be Absent or Manageable (Prenatal Inpatient)  Signs and symptoms of listed potential problems will be absent or manageable by discharge/transition of care (reference Prenatal Inpatient (Adult,Obstetrics,Pediatric) [facility]).   Vital signs stable and afebrile throughout shift. Patient awake occasionally due to abdominal cramping with pain radiating to upper thighs. Abdomen is palpating mild/moderate with contractions. Given prn morphine and patient was able to rest with occasional wake ups. Noted this morning that pain seemed to be increasing and feels it is due to the medication wearing off. Declines pain medication/pain reduction measures at this time. Patient reported scant, dark red bleeding when she wiped this after going to the bathroom. No complaints of abdominal tenderness, or back pain. Will continue to monitor.

## 2017-06-21 NOTE — PLAN OF CARE
Problem: Goal Outcome Summary  Goal: Goal Outcome Summary  Outcome: No Change  Patient is having constant cramping in her upper thighs which have become painful. Given Flexeril 5 mg with no relief so added another Flexeril 5 mg and also Atarax 100 mg PO. Dr. Lino aware of continued pain in legs. With speculum exam there was purulent drainage. There was a 5 cm x 1 cm area of red blood on patient's pad. Continue to watch closely for s/s of infection. Patient aware of close monitoring. Will let us know of any changes. Continue present cares.

## 2017-06-21 NOTE — PROGRESS NOTES
Interval Progress Note:    S: Throughout the day patient reported that she feels cramping contractions.  For times she was able to sleep through them.  She reported that there would be a few that would be mild and then one that's stronger.  Then at 1730 RN alerted G3 that patient went to the bathroom and had worsening cramping and severe contractions.  She had pink-red discharge on the toilet paper.  Patient reports that over the past few minutes her contractions have become much more severe and strong.     O:   Vitals:    17 0325 17 0900 17 1305 17 1650   BP: 101/57 95/61  111/61   Pulse:  91     Resp: 16 18  20   Temp: 98.1  F (36.7  C) 98.2  F (36.8  C) 98.3  F (36.8  C) 98.2  F (36.8  C)   TempSrc: Oral Oral Oral Oral   Weight:       Height:         General: awake, alert, answering questions appropriately, breathing through contractions, in pain  SSE: normal appearing external genitalia with thin pink discharge, normal appearing vaginal mucosa, cervix visually dilated to 4.5cm, 70% effaced, soft, red/shadowed uterine material exerting pressure onto cervix.  Unclear if placenta is presenting part or if it is shadow of fetal parts within membranes.      A&P: Lolis Johnson is a 32 year old  at 19w0d HD#4 PPROM of baby A, now post delivery day 2 s/p vaginal sweep to remove triplet A after being found in vagina.  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  Tri-tri pregnancy - now Di-Di pregnancy s/p reduction of Triplet A.  Now with painful contractions and cervical change.    Plan to transfer to labor and delivery immediately.    Discussed pain control options with patient.  She desires nitrous oxide and potentially IV fentanyl if nitrous oxide does not adequately control pain.    Dr. Levine updated on patient's status via phone.    Lizeth Blanchard MD  OBGYN PGY3

## 2017-06-21 NOTE — PROVIDER NOTIFICATION
06/20/17 1940   Provider Notification   Provider Name/Title Dr. Lino   Method of Notification In Department   Request Evaluate in Person   Notification Reason Pain   Patient has constant cramping in her upper thighs which are causing her a lot of discomfort. Offered patient Flexeril or Vistaril and patient opted for Flexeril.

## 2017-06-22 VITALS
SYSTOLIC BLOOD PRESSURE: 99 MMHG | BODY MASS INDEX: 26.51 KG/M2 | HEART RATE: 91 BPM | DIASTOLIC BLOOD PRESSURE: 63 MMHG | RESPIRATION RATE: 16 BRPM | HEIGHT: 69 IN | WEIGHT: 179 LBS | TEMPERATURE: 98.2 F

## 2017-06-22 DIAGNOSIS — O03.9 SPONTANEOUS ABORTION: Primary | ICD-10-CM

## 2017-06-22 LAB — HGB BLD-MCNC: 10.6 G/DL (ref 11.7–15.7)

## 2017-06-22 PROCEDURE — 85018 HEMOGLOBIN: CPT | Performed by: OBSTETRICS & GYNECOLOGY

## 2017-06-22 PROCEDURE — 99238 HOSP IP/OBS DSCHRG MGMT 30/<: CPT | Performed by: OBSTETRICS & GYNECOLOGY

## 2017-06-22 PROCEDURE — 36415 COLL VENOUS BLD VENIPUNCTURE: CPT | Performed by: OBSTETRICS & GYNECOLOGY

## 2017-06-22 PROCEDURE — 25000132 ZZH RX MED GY IP 250 OP 250 PS 637: Performed by: OBSTETRICS & GYNECOLOGY

## 2017-06-22 RX ORDER — IBUPROFEN 400 MG/1
400-800 TABLET, FILM COATED ORAL EVERY 6 HOURS PRN
Qty: 60 TABLET | Refills: 0 | Status: SHIPPED | OUTPATIENT
Start: 2017-06-22 | End: 2017-06-29

## 2017-06-22 RX ORDER — AMOXICILLIN 250 MG
1-2 CAPSULE ORAL 2 TIMES DAILY
Qty: 60 TABLET | Refills: 0 | Status: SHIPPED | OUTPATIENT
Start: 2017-06-22 | End: 2017-06-29

## 2017-06-22 RX ADMIN — IBUPROFEN 800 MG: 400 TABLET ORAL at 08:27

## 2017-06-22 RX ADMIN — IBUPROFEN 800 MG: 400 TABLET ORAL at 01:51

## 2017-06-22 NOTE — DISCHARGE INSTRUCTIONS
" Loss Discharge Instructions   We recommend you see your provider to check on your physical and emotional recovery, and to walk through your experience within 1-2 weeks.  Any further recommendations for follow up will be discussed with your provider at that time.       The Blues and Grief  After delivery you will experience hormone changes and strong emotions, often referred to as the \"baby blues\".  You may find yourself easily upset, tearful or angry.  In addition, you will be grieving for your own loss and may feel terribly tired and not want to face friends, family or new babies.    Your feelings will be hard to predict during this time.  You may have many ups and downs.  Be kind and patient with yourself.    No two people grieve the same way.  This is true of spouses and partners.  Try to have open communication, but don't depend solely on each other for support.  Reach out to friends, family, clergy and your health care providers.  You don't have to handle this alone.    Normal grief after a pregnancy or  loss often lasts for weeks or months.  Over time, you will have more good days than bad ones.  The first year is usually the hardest as you face significant dates and events for the first time.    At first, your sadness or anxiety may keep you from sleeping, eating, being with others or getting out of the house.  If, after a week, you aren't able to take care of basic daily tasks, please call your care provider right away.  It could be more serious than the blues or grief.  Going back to work:  Even though you did not bring home a living baby, you and your partner have a right to any family and bereavement leave benefits your employer offers.  When you do return to work, be prepared for some adjustment time.    Call your health care provider if you have any of these symptoms:  You soak a sanitary pad with blood within 1 hour, or you see blood clots larger than a golf ball.  Bleeding that lasts " more than 6 weeks.  You have vaginal discharge that smells bad.   A fever above 100.4 F (38 C), with or without chills  Severe, pain, cramping or tenderness in your lower belly area.  If you have pain that increases or does not go away from an episiotomy or perineal tear  Increased pain, swelling, redness or fluid around your stitches.  A need to urinate more frequently (use the toilet more often), more urgently (use the toilet very quickly), or it burns when you urinate.  Redness, swelling or pain around a vein in your leg.  Problems with coping with sadness, anxiety, or depression.  Your breasts are engorged (hard and swollen), red and very tender and you have a fever.   If you have nausea and vomiting.  If you have chest pain and cough or are gasping for air.  You have questions or concerns after you return home.    Keep your hands clean:  Always wash your hands before touching your perineal area and stitches.  This helps reduce your risk of infection.  If your hands aren't dirty, you may use an alcohol hand-rub to clean your hands. Keep your nails clean and short.

## 2017-06-22 NOTE — PROGRESS NOTES
[facility]  MATERNAL CHILD HEALTH   SOCIAL WORK PROGRESS NOTE    DATA:     Pt, Lolis Johnson ( 1985, contact [phone]), is a 31 y/o female. SW met with pt and her , Pako, after she delivered the second two babies from her triplet pregnancy last night.    Pt was receptive to SW bereavement resources. Family was open to education about postpartum mood and anxiety disorders. Pt denied a significant mental health history. Pt expressed feeling well supported by her family and her medical team. Pt hopes to rely on her family as she antonio with the loss her babies. Family were amenable to SW support.    Family has chosen [place] group disposition for their babies. Pt's bedside nurse aware of family's decision. Family provided information about quarterly services for group disposition.    INTERVENTION:     Introduced self as SW covering while pt's primary SW (KASSY Grossman) is out of office. Chart review. SW met with family to provide support and guidance through loss of their babies. SW provided education about postpartum mood and anxiety disorders. SW shared information about hospital and community bereavement resources. SW provide emotional support and active listening. SW provided information about [place] [facility]. Family aware of ongoing SW supportive services.    ASSESSMENT:     Pt and her  were quiet during SW visit. Pt appears to be comforted by the supportive presence of her  bedside. Pt expressed intent to rely on her social supports, especially her partner and her parents, who reside locally. Family were tearful during conversation about disposition with this SW, but were unwilling to explore the grief behind their tears during this SW visit. Couple are eager to discharge home. Family were receptive to SW resources provided bedside and expressed no additional needs at this time.    PLAN:     SW will remain available to  follow and provide ongoing psychosocial support and access to appropriate resources; family provided SW contact information should they have ongoing service needs. SW will continue to collaborate with the multidisciplinary team. Pt hopes to discharge home early this afternoon.    NAT Renee, [facility]  Clinical   Maternal Child Health  [facility]  Phone:   [phone]  Pager:    [phone]

## 2017-06-22 NOTE — PROVIDER NOTIFICATION
06/21/17 2100   Provider Notification   Provider Name/Title Dr. Tsai, Dr. Levine   Method of Notification At Bedside;In Department   Request Evaluate in Person   Notification Reason Bleeding     Called provider into room for the possibility of placenta being delivered in the toilet. After further investigation, provider stated it was just a clot.     Patient was brought back to bed, and a manual sweep was preformed by provider

## 2017-06-22 NOTE — PLAN OF CARE
Problem: Goal Outcome Summary  Goal: Goal Outcome Summary  Outcome: No Change  Upon transfer of care at 1910, patient had delivered both babies, and two placentas. Patient's fundal checks were firm, midline, U/1 throughout her first 2 hour recovery period. Patient was up to void at 2045 in which she passed an 60 cc clot into the toilet. Dr. Lino, and Dr. Levine were at bedside to manually remove the third placenta. The patient used nitrous to help control pain during the procedure. During the second 2 hour recovery period patient's fundus was firm, midline, u/1 with a scan amount of bleeding.      Patient received 10 mg of oxy, and 2 gram of ancef.      Patient now resting at this time. Parents are requesting only footprints and handprints for momentos at this time. Babies in the loss room at this time. Will continue to monitor and update provider with any changes or concerns.

## 2017-06-22 NOTE — PROGRESS NOTES
Postpartum Progress Note  Lolis Johnson  [id number]    Subjective:   Patient is feeling ok this morning.  She feels tired, but is coping.  Lochia is similar to a light menses.  Eating and drinking regular food without nausea/emesis.  When she first got up to walk this morning she was experiencing some dizziness, which she attributes to not eating for hours.  Voiding without difficulty.  Her pain is adequately controlled.    Objective:  Vitals:    17 2304 17 0153 17 0555 17 0830   BP: 92/58 92/53 90/51 99/63   Pulse:       Resp:     Temp: 98.4  F (36.9  C) 98.2  F (36.8  C) 98.3  F (36.8  C) 98.2  F (36.8  C)   TempSrc: Oral Oral Oral Oral   Weight:       Height:           I/O last 3 completed shifts:  In: 6 [I.V.:6]  Out: 978 [Urine:800; Blood:178]    General: awake, alert, answering questions appropriately, in no acute distress, comfortable  Heart: regular rate and rhythm  Lungs: clear to auscultation bilaterally  Abdomen: Soft, non-tender, non-distended; fundus firm 3cm below the level of the umbilicus  Extremities: no edema in BLE    Labs:  Hemoglobin   Date Value Ref Range Status   2017 10.6 (L) 11.7 - 15.7 g/dL Final     Hemoglobin   Date Value Ref Range Status   2017 10.6 (L) 11.7 - 15.7 g/dL Final   2017 10.7 (L) 11.7 - 15.7 g/dL Final       Assessment/Plan: 32 year old  who is postpartum day number 3 s/p delivery of previable fetus A, postpartum day number 1 s/p delivery of previable fetus B and fetus C as well as 3 placentas and manual sweep, currently stable.    Routine partum care:     Ambulating with some dizziness, voiding without difficulty.  Reassess dizziness prior to discharge.   Tolerating regular diet, passing flatus.  Heme:    Hgb 10.7 > EBL 178ml> 10.6  Previable delivery of Fetus A, Fetus B, Fetus C:     Patient declines autopsy on all three fetuses.  Patient has opted for hospital group cremation for remains.   Social work and spiritual  services consult in place  Rh positive, rhogam eval not indicated  Rubella immune    Dispo: discharge to home likely today with one week mood check with Dr. Aly Blanchard MD  OBGYN PGY2    Physician Attestation   I, Catina Vallecillo, saw and evaluated this patient prior to discharge.  I discussed the patient with the resident and agree with plan of care as documented in the resident note.      I personally reviewed vital signs, medications and labs.    I personally spent 20 minutes on discharge activities.    Catina Vallecillo  Date of Service (when I saw the patient): 06/22/17

## 2017-06-22 NOTE — PLAN OF CARE
Lolis Johnson is ready for discharge at 1130. She and her  wanted to see the babies before leaving for home, so had a moment in the loss room.   Home meds given and knows when to f/u with the MD.   Clinic number given for questions or concerns.   D/C at 7077

## 2017-06-22 NOTE — PLAN OF CARE
Problem: Prenatal Inpatient (Adult,Obstetrics,Pediatric)  Goal: Signs and Symptoms of Listed Potential Problems Will be Absent or Manageable (Prenatal Inpatient)  Signs and symptoms of listed potential problems will be absent or manageable by discharge/transition of care (reference Prenatal Inpatient (Adult,Obstetrics,Pediatric) [facility]).   Outcome: Improving  Lolis Johnson feels like she is ready to go home this afternoon. Vitals stable, dizziness improved with fluids and eating breakfast. Voiding. Ambulating. Scant lochia. Fundus firm U/2. \  Coping: normal for situation. States she has help at home.  and SW to see patient today.   At this time, does not wish to see babies who are both in the loss room. She chooses group burial.   Will keep monitoring.

## 2017-06-22 NOTE — PLAN OF CARE
Late Entry: Patient moved to 460 from antepartum, getting more uncomfortable and having contractions. Dr. Lino and Dr. Levine in to see patient. PROM at 1825, pt started feeling pressure and in more pain. Fetus B delivered at 1842. Fetus C delivered at 1856. Placenta delivered at 1900. Rectal miso given. Both babies bundled and given to mother to hold. Parents both very tearful.

## 2017-06-22 NOTE — PLAN OF CARE
Problem: Goal Outcome Summary  Goal: Goal Outcome Summary  Outcome: No Change  Baby A band number- [id number]  Baby B band number- [id number]     Bands on mother and father, bands in basinette by babies

## 2017-06-22 NOTE — PROGRESS NOTES
SPIRITUAL HEALTH SERVICES  SPIRITUAL ASSESSMENT Progress Note  [facility] Labor and Delivery    Follow-up visit with pt Lolis after she delivered the second two babies from her triplet pregnancy last night. Her  Pako was in the room sleeping during my visit. Shared brief reflective conversation with Lolis concerning her feelings about the loss. She declined a longer visit at this time and declined further [facility] follow-up.     PLAN: No follow-up plan at this time.     Ashlyn Baird M.Div.  Associate   Pager [phone]

## 2017-06-22 NOTE — L&D DELIVERY NOTE
Delivery Summary    Lolis Johnson is a 32 year old now . She was admitted on 17 at 18w5d, and likely had PPROM of fetus A on 17. Her pregnancy was complicated by trichorionic triamnoitic IVF pregnancy, with triplet A possessing an unbalanced translocation. She underwent selective reduction of Triplet A at outside hospital on 17. Upon admissions, she was initially expectantly managed. On 17 she reported cramping. Exam revealed passage of fetus A into vagina. She was taken to the operative room for removal of fetus A from the vagina and irrigation of the vagina with betadine. Fetus A placenta remained in situ. She was then started on latency antibiotics and expectantly managed with ongoing pregnancy of fetus B and fetus C. She remained afebrile without signs of progressing labor or chorioamnionitis until 2017 when she reported painful contractions. She received nitrous oxide and IV fentanyl for pain. She had PPROM of fetus B at 1825. She progressed spontaneously and had a  of fetus B (labeled Baby1 in delivery summary) at 1842 on 17, vertex presentation. The cord was clamped and cut and demised fetus placed on mother's abdomen. Time of death was time of delivery, with Apgar 0 and 0. The placenta remained in situ. She then continued to labor and had a  of fetus C (labeled Baby2 in delivery summary) at 1856 on 17, en caul. Intact placenta C spontaneously delivered with fetus. Amniotomy was performed with clear fluid noted. The cord was clamped and cut. Time of death was time of delivery, with Apgar 0 and 0. Placenta B delivered with gentle expression at 1900. 800 mcg misoprostol was placed rectally. Placenta A remained in situ. She was expectantly managed initially, and then at 0 due to ongoing bleeding, manual sweep was recommended. Manual sweep x2 manually removed the placenta, mostly intact with small portion retrieved on second sweep. IV ancef 2g given. The patient  tolerated the procedure well.  as QBL was inaccurate. Fundus firm 5 cm below the umbilicus with minimal bleeding noted. The patient does not desire autopsy or pathology exam and plans on hospital disposal of remains.    Fetal/ Demise Exam  Fetus B Measurements: 278g; 245mm in length; HC 170mm  Fetus C Measurements: 188g; 235mm in length; HC 165mm  Fetus B exam:   The infant had a no nuchal cord. Fluid was clear. External examination reveals a well-developed male fetus. The features of the infant were without syndromic appearance. External anatomy was within normal limits and mouth and anus were patent without cleft lip or palette. Two eyes with fused lids, and has a small anteverted nose with patent nares, an intact lip and palate, a nl chin, The head is grossly unremarkable with normally formed ears, and no nuchal thickening. The thorax is grossly unremarkable, has two nipple buds, and the sternum ends approximately half the distance from nipple line to umbilicus. The vertebral column is intact, and there are five digits on all extremities with no syndactyly or abnormal creases. The anus is patent and there are no abnormal joint contractures. There is an attached pink-white three vessel cord that appears nl caliber and length with mild odor to placenta.     Fetus C exam:    The infant had a no nuchal cord. Fluid was clear. External examination reveals a well-developed female fetus. The features of the infant were without syndromic appearance. External anatomy was within normal limits and mouth and anus were patent without cleft lip or palette. Two eyes with fused lids, and has a small anteverted nose with patent nares, an intact lip and palate, a nl chin, The head is grossly unremarkable with normally formed ears, and no nuchal thickening. The thorax is grossly unremarkable, has two nipple buds, and the sternum ends approximately half the distance from nipple line to umbilicus. The vertebral column  "is intact, and there are five digits on all extremities with no syndactyly or abnormal creases. The anus is patent and there are no abnormal joint contractures. There is an attached pink-white three vessel cord that appears nl caliber and length with mild odor to placenta.     Lolis Johnson MRN# [id number]   Age: 32 year old YOB: 1985     ASSESSMENT & PLAN: 32 year old g1 now p0010 delivered remaining 2 triplets and 3 placentas on 2017:       Alex, Frank1 Lolis ACKERMAN [[id number]]     Labor Event Times    Labor onset date:  17 Onset time:   5:30 PM            Labor Length    3rd Stage (hrs):  0 (min):  18      Labor Events     labor?:  Yes      Rupture identifier:  Rupture 1   Rupture date/time: 17   Rupture type:  Premature Rupture of Membranes      1:1 continuous labor support provided by?:  RN          Delivery/Placenta Date and Time    Delivery Date:  17 Delivery Time:   6:42 PM   Placenta Date/Time:  2017  7:00 PM      Apgars    Living status:  Fetal Demise    1 Minute 5 Minute 10 Minute 15 Minute 20 Minute   Skin color: 0  0  0  0  0    Heart rate: 0  0  0  0  0    Reflex irritability: 0  0  0  0  0    Muscle tone: 0  0  0  0  0    Respiratory effort: 0  0  0  0  0    Total: 0  0  0  0  0       Apgars assigned by:  DR. MUNOZ      [place] Measurements    Weight:  9.8 oz Length:  9.75\"   Head circumference:  0.171 m       Labor Events and Shoulder Dystocia    Fetal Tracing Comments:  n/a, previable   Shoulder dystocia present?:  Neg            Delivery (Maternal) (Provider to Complete) (062299)    Episiotomy:  None         Mother's Information  Mother: Lolis Johnson #[id number]    Start of Mother's Information     IO Blood Loss  17 1730 - 17 0239    Mom's I/O Activity            End of Mother's Information  Mother: Lolis Johnson #[id number]            Delivery - Provider to Complete (261197)    Delivering clinician:  JUSTIN MUNOZ   Attempted " "Delivery Types (Choose all that apply):  Spontaneous Vaginal Delivery   Delivery Type (Choose the 1 that will go to the Birth History):  Vaginal, Spontaneous Delivery                     Other personnel:   Provider Role   LOTTIE ALCOCER            Placenta    Immediate Cord Clamping:  Done   Date/Time:  2017  7:00 PM   Removal:  Manual Removal, Expressed, Spontaneous   Comments:  fetus B placenta - expressed; fetus C placenta - spontaneous en caul; fetus A placenta - retained, manual sweep with manual removal   Disposition:  Pathology      Anesthesia    Method:  Nitrous Oxide, INTRAVENOUS REGIONAL         Presentation and Position    Presentation:  Vertex   Position:  Middle Occiput Anterior               Alex, April Danielle FD [[id number]]     Labor Event Times    Labor onset date:  17 Onset time:   5:30 PM            Labor Length    3rd Stage (hrs):  0 (min):  4      Labor Events     labor?:  Yes      Rupture identifier:  Rupture 2   Rupture date/time:        Delivery/Placenta Date and Time    Delivery Date:  17 Delivery Time:   6:56 PM   Placenta Date/Time:  2017  7:00 PM      Apgars    Living status:  Fetal Demise    1 Minute 5 Minute 10 Minute 15 Minute 20 Minute   Skin color: 0  0  0  0  0    Heart rate: 0  0  0  0  0    Reflex irritability: 0  0  0  0  0    Muscle tone: 0  0  0  0  0    Respiratory effort: 0  0  0  0  0    Total: 0  0  0  0  0       Apgars assigned by:  DR. MUNOZ       Measurements    Weight:  6.6 oz Length:  9.5\"   Head circumference:  0.165 m       Labor Events and Shoulder Dystocia    Fetal Tracing Comments:  n/a previable   Shoulder dystocia present?:  Neg            Delivery (Maternal) (Provider to Complete) (245211)    Episiotomy:  None         Mother's Information  Mother: Lolis Johnson O #[id number]    Start of Mother's Information     IO Blood Loss  17 1730 - 17 0239    Mom's I/O Activity            End of Mother's Information  " Mother: Lolis Johnson #[id number]            Delivery - Provider to Complete ([id number])    Delivering clinician:  JUSTIN MUNOZ   Attempted Delivery Types (Choose all that apply):  Spontaneous Vaginal Delivery   Delivery Type (Choose the 1 that will go to the Birth History):  Vaginal, Spontaneous Delivery                     Other personnel:   Provider Role   LOTTIE ALCOCER             Placenta    Immediate Cord Clamping:  Done   Date/Time:  6/21/2017  7:00 PM   Removal:  Manual Removal, Spontaneous, Expressed   Comments:  fetus B - expressed; fetus C - spontaneous en caul; fetus A - retained, manual removal w/ manual sweep   Disposition:  Pathology      Anesthesia    Method:  INTRAVENOUS REGIONAL, Nitrous Oxide         Presentation and Position    Presentation:  Vertex                    MD Justin Dent MD   June 21 2017  10:40 PM

## 2017-06-23 ENCOUNTER — TELEPHONE (OUTPATIENT)
Dept: OBGYN | Facility: CLINIC | Age: 32
End: 2017-06-23

## 2017-06-23 NOTE — TELEPHONE ENCOUNTER
After reading delivery summery sounds like B has one clamp and C has no clamp on cord.  Discussed with pathologist.    Xuan Anders MD

## 2017-06-23 NOTE — TELEPHONE ENCOUNTER
Pathologist Fernando Reese calling regarding patients pathology from delivery on 6/18 - has questions that nurse was not able to answer. Wants to speak to on call MD today if possible. Can call him at office number: [phone] or pager: [phone]. Thanks!  Leni Girard

## 2017-06-27 LAB — COPATH REPORT: NORMAL

## 2017-06-29 ENCOUNTER — OFFICE VISIT (OUTPATIENT)
Dept: OBGYN | Facility: CLINIC | Age: 32
End: 2017-06-29
Payer: COMMERCIAL

## 2017-06-29 VITALS
SYSTOLIC BLOOD PRESSURE: 102 MMHG | HEART RATE: 89 BPM | WEIGHT: 171.5 LBS | BODY MASS INDEX: 25.4 KG/M2 | DIASTOLIC BLOOD PRESSURE: 67 MMHG | TEMPERATURE: 97.8 F | HEIGHT: 69 IN

## 2017-06-29 DIAGNOSIS — O03.9 SPONTANEOUS PREGNANCY LOSS: Primary | ICD-10-CM

## 2017-06-29 PROCEDURE — 99212 OFFICE O/P EST SF 10 MIN: CPT | Performed by: OBSTETRICS & GYNECOLOGY

## 2017-06-29 NOTE — NURSING NOTE
"No chief complaint on file.      Initial /67  Pulse 89  Temp 97.8  F (36.6  C) (Oral)  Ht 5' 9\" (1.753 m)  Wt 171 lb 8 oz (77.8 kg)  LMP 2017 (Approximate)  BMI 25.33 kg/m2 Estimated body mass index is 25.33 kg/(m^2) as calculated from the following:    Height as of this encounter: 5' 9\" (1.753 m).    Weight as of this encounter: 171 lb 8 oz (77.8 kg).  BP completed using cuff size: regular        The following HM Due: NONE      The following patient reported/Care Every where data was sent to:  P ABSTRACT QUALITY INITIATIVES [[id number]]       patient has appointment for today    Lucrecia Fernandez CMA                 "

## 2017-06-29 NOTE — MR AVS SNAPSHOT
"              After Visit Summary   6/29/2017    Lolis Johnson    MRN: [id number]           Patient Information     Date Of Birth          1985        Visit Information        Provider Department      6/29/2017 10:45 Christiane Steele MD [facility]        Today's Diagnoses     Spontaneous pregnancy loss    -  1       Follow-ups after your visit        Who to contact     If you have questions or need follow up information about today's clinic visit or your schedule please contact [facility] directly at [phone].  Normal or non-critical lab and imaging results will be communicated to you by Plink Searchhart, letter or phone within 4 business days after the clinic has received the results. If you do not hear from us within 7 days, please contact the clinic through [employer] or phone. If you have a critical or abnormal lab result, we will notify you by phone as soon as possible.  Submit refill requests through LookTracker or call your pharmacy and they will forward the refill request to us. Please allow 3 business days for your refill to be completed.          Additional Information About Your Visit        MyChart Information     LookTracker gives you secure access to your electronic health record. If you see a primary care provider, you can also send messages to your care team and make appointments. If you have questions, please call your primary care clinic.  If you do not have a primary care provider, please call [phone] and they will assist you.        Care EveryWhere ID     This is your Care EveryWhere ID. This could be used by other organizations to access your [place] medical records  [id number]        Your Vitals Were     Pulse Temperature Height Last Period BMI (Body Mass Index)       89 97.8  F (36.6  C) (Oral) 5' 9\" (1.753 m) 02/07/2017 (Approximate) 25.33 kg/m2        Blood Pressure from Last 3 Encounters:   06/29/17 102/67   06/22/17 99/63   06/08/17 126/60    Weight " from Last 3 Encounters:   06/29/17 171 lb 8 oz (77.8 kg)   06/18/17 179 lb (81.2 kg)   06/08/17 179 lb (81.2 kg)              Today, you had the following     No orders found for display         Today's Medication Changes          These changes are accurate as of: 6/29/17  9:35 PM.  If you have any questions, ask your nurse or doctor.               Stop taking these medicines if you haven't already. Please contact your care team if you have questions.     calcium carbonate 1250 MG tablet   Commonly known as:  OS-ANASTASIA 500 mg [place]   Stopped by:  Christiane Lu MD           cetirizine 10 MG tablet   Commonly known as:  zyrTEC   Stopped by:  Christiane Lu MD           ferrous sulfate 142 (45 FE) MG Tbcr   Commonly known as:  SLO-FE   Stopped by:  Christiane Lu MD           ibuprofen 400 MG tablet   Commonly known as:  ADVIL/MOTRIN   Stopped by:  Christiane Lu MD           senna-docusate 8.6-50 MG per tablet   Commonly known as:  SENOKOT-S;PERICOLACE   Stopped by:  Christiane Lu MD                    Primary Care Provider Office Phone # Fax #    Christiane Lu [phone]       [facility] [address] [facility] [address]  [facility] [address]        Equal Access to Services     [facility] AH: Hadii aad ku hadasho Soomaali, waaxda luqadaha, qaybta kaalmada adeegyada, yanelis palmer haywayne britt . So [facility] [phone].    ATENCIÓN: Si habla español, tiene a powers disposición servicios gratuitos de asistencia lingüística. Llame al [phone].    We comply with applicable federal civil rights laws and [place] laws. We do not discriminate on the basis of race, color, national origin, age, disability sex, sexual orientation or gender identity.            Thank you!     Thank you for choosing [facility]  for your care. Our goal is always to provide you with excellent care. Hearing back from our patients is one way we  can continue to improve our services. Please take a few minutes to complete the written survey that you may receive in the mail after your visit with us. Thank you!             Your Updated Medication List - Protect others around you: Learn how to safely use, store and throw away your medicines at www.disposemymeds.org.          This list is accurate as of: 6/29/17  9:35 PM.  Always use your most recent med list.                   Brand Name Dispense Instructions for use Diagnosis    CLARITIN PO           levothyroxine 50 MCG tablet    SYNTHROID/LEVOTHROID    90 tablet    Take 1 tablet (50 mcg) by mouth daily    Subclinical hypothyroidism       MULTIVITAMIN PO

## 2017-06-30 NOTE — PROGRESS NOTES
"S:  Lolis presents for f/u after miscarriage of triplets last week.  Doing ok.  Family and friends have been very supportive.   They are communicating directly and stating their needs.   They are considering options for future pregnancy.     No fevers  No foul discharge  Bleeding scant.    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 Counseling options discussed and recommended.  "

## 2017-07-28 ENCOUNTER — TRANSFERRED RECORDS (OUTPATIENT)
Dept: HEALTH INFORMATION MANAGEMENT | Facility: CLINIC | Age: 32
End: 2017-07-28

## 2017-08-07 DIAGNOSIS — R30.0 DYSURIA: ICD-10-CM

## 2017-08-07 LAB
ALBUMIN UR-MCNC: NEGATIVE MG/DL
APPEARANCE UR: CLEAR
BILIRUB UR QL STRIP: NEGATIVE
COLOR UR AUTO: YELLOW
GLUCOSE UR STRIP-MCNC: NEGATIVE MG/DL
HGB UR QL STRIP: NEGATIVE
KETONES UR STRIP-MCNC: NEGATIVE MG/DL
LEUKOCYTE ESTERASE UR QL STRIP: NEGATIVE
NITRATE UR QL: NEGATIVE
PH UR STRIP: 5.5 PH (ref 5–7)
SP GR UR STRIP: 1.01 (ref 1–1.03)
URN SPEC COLLECT METH UR: NORMAL
UROBILINOGEN UR STRIP-ACNC: 0.2 EU/DL (ref 0.2–1)

## 2017-08-07 PROCEDURE — 81003 URINALYSIS AUTO W/O SCOPE: CPT | Performed by: OBSTETRICS & GYNECOLOGY

## 2017-08-07 PROCEDURE — 87086 URINE CULTURE/COLONY COUNT: CPT | Performed by: OBSTETRICS & GYNECOLOGY

## 2017-08-08 LAB
BACTERIA SPEC CULT: NO GROWTH
MICRO REPORT STATUS: NORMAL
SPECIMEN SOURCE: NORMAL

## 2017-09-28 ENCOUNTER — TRANSFERRED RECORDS (OUTPATIENT)
Dept: HEALTH INFORMATION MANAGEMENT | Facility: CLINIC | Age: 32
End: 2017-09-28

## 2017-10-09 ENCOUNTER — OFFICE VISIT (OUTPATIENT)
Dept: FAMILY MEDICINE | Facility: CLINIC | Age: 32
End: 2017-10-09
Payer: COMMERCIAL

## 2017-10-09 VITALS
TEMPERATURE: 98 F | HEIGHT: 69 IN | BODY MASS INDEX: 23.99 KG/M2 | SYSTOLIC BLOOD PRESSURE: 100 MMHG | HEART RATE: 80 BPM | WEIGHT: 162 LBS | DIASTOLIC BLOOD PRESSURE: 80 MMHG

## 2017-10-09 DIAGNOSIS — Z01.818 PREOP GENERAL PHYSICAL EXAM: Primary | ICD-10-CM

## 2017-10-09 DIAGNOSIS — Z31.9 INFERTILITY MANAGEMENT: ICD-10-CM

## 2017-10-09 PROCEDURE — 99214 OFFICE O/P EST MOD 30 MIN: CPT | Performed by: PHYSICIAN ASSISTANT

## 2017-10-09 NOTE — MR AVS SNAPSHOT
After Visit Summary   10/9/2017    Lolis Johnson    MRN: [id number]           Patient Information     Date Of Birth          1985        Visit Information        Provider Department      10/9/2017 5:40 PM Trudi Agrawal PA-C [facility]        Today's Diagnoses     Preop general physical exam    -  1    Infertility management          Care Instructions      Before Your Surgery      Call your surgeon if there is any change in your health. This includes signs of a cold or flu (such as a sore throat, runny nose, cough, rash or fever).    Do not smoke, drink alcohol or take over the counter medicine (unless your surgeon or primary care doctor tells you to) for the 24 hours before and after surgery.    If you take prescribed drugs: Follow your doctor s orders about which medicines to take and which to stop until after surgery.    Eating and drinking prior to surgery: follow the instructions from your surgeon    Take a shower or bath the night before surgery. Use the soap your surgeon gave you to gently clean your skin. If you do not have soap from your surgeon, use your regular soap. Do not shave or scrub the surgery site.  Wear clean pajamas and have clean sheets on your bed.           Follow-ups after your visit        Who to contact     If you have questions or need follow up information about today's clinic visit or your schedule please contact [facility] directly at [phone].  Normal or non-critical lab and imaging results will be communicated to you by MyChart, letter or phone within 4 business days after the clinic has received the results. If you do not hear from us within 7 days, please contact the clinic through MyChart or phone. If you have a critical or abnormal lab result, we will notify you by phone as soon as possible.  Submit refill requests through Yatedo or call your pharmacy and they will forward the refill request to us. Please allow 3  "business days for your refill to be completed.          Additional Information About Your Visit        MyChart Information     DailyDigitalhart gives you secure access to your electronic health record. If you see a primary care provider, you can also send messages to your care team and make appointments. If you have questions, please call your primary care clinic.  If you do not have a primary care provider, please call [phone] and they will assist you.        Care EveryWhere ID     This is your Care EveryWhere ID. This could be used by other organizations to access your [place] medical records  [id number]        Your Vitals Were     Pulse Temperature Height Last Period BMI (Body Mass Index)       80 98  F (36.7  C) (Oral) 5' 9\" (1.753 m) 10/04/2017 (Approximate) 23.92 kg/m2        Blood Pressure from Last 3 Encounters:   10/09/17 100/80   06/29/17 102/67   06/22/17 99/63    Weight from Last 3 Encounters:   10/09/17 162 lb (73.5 kg)   06/29/17 171 lb 8 oz (77.8 kg)   06/18/17 179 lb (81.2 kg)              Today, you had the following     No orders found for display       Primary Care Provider Office Phone # Fax #    Christiane Lu [phone]       [address] [facility] [address]  [facility] [address]        Equal Access to Services     LO PHAN : Hadii aad ku hadasho Soomaali, waaxda luqadaha, qaybta kaalmada adejohn paulda, yanelis godinez. So [facility] [phone].    ATENCIÓN: Si habla español, tiene a powers disposición servicios gratuitos de asistencia lingüística. Llame al [phone].    We comply with applicable federal civil rights laws and [place] laws. We do not discriminate on the basis of race, color, national origin, age, disability, sex, sexual orientation, or gender identity.            Thank you!     Thank you for choosing [facility]  for your care. Our goal is always to provide you with excellent care. Hearing back from our patients is one way " we can continue to improve our services. Please take a few minutes to complete the written survey that you may receive in the mail after your visit with us. Thank you!             Your Updated Medication List - Protect others around you: Learn how to safely use, store and throw away your medicines at www.disposemymeds.org.          This list is accurate as of: 10/9/17 11:59 PM.  Always use your most recent med list.                   Brand Name Dispense Instructions for use Diagnosis    levothyroxine 50 MCG tablet    SYNTHROID/LEVOTHROID    90 tablet    Take 1 tablet (50 mcg) by mouth daily    Subclinical hypothyroidism       MULTIVITAMIN PO

## 2017-10-09 NOTE — NURSING NOTE
"Chief Complaint   Patient presents with     Pre-Op Exam       Initial /80 (Cuff Size: Adult Regular)  Pulse 80  Temp 98  F (36.7  C) (Oral)  Ht 5' 9\" (1.753 m)  Wt 162 lb (73.5 kg)  LMP 10/04/2017 (Approximate)  BMI 23.92 kg/m2 Estimated body mass index is 23.92 kg/(m^2) as calculated from the following:    Height as of this encounter: 5' 9\" (1.753 m).    Weight as of this encounter: 162 lb (73.5 kg).  Medication Reconciliation: complete   Melisa De La Torre, Certified Medical Assistant (AAMA)     "

## 2017-10-09 NOTE — PROGRESS NOTES
[address] [address]  [phone]  Dept: [phone]    PRE-OP EVALUATION:  Today's date: 10/9/2017    Lolis Johnson (: 1985) presents for pre-operative evaluation assessment as requested by Dr. Constantino.  She requires evaluation and anesthesia risk assessment prior to undergoing surgery/procedure for treatment of Egg retrevial for IVF .  Proposed procedure: egg retrieval for IVF    Date of Surgery/ Procedure: 10/24/17  Time of Surgery/ Procedure: 10:00am  Hospital/Surgical Facility: [facility]  Fax number for surgical facility: [phone]  Primary Physician: Christiane Lu  Type of Anesthesia Anticipated: to be determined    Patient has a Health Care Directive or Living Will:  NO    Preop Questions 10/6/2017   1.  Do you have a history of heart attack, stroke, stent, bypass or surgery on an artery in the head, neck, heart or legs? No   2.  Do you ever have any pain or discomfort in your chest? No   3.  Do you have a history of  Heart Failure? No   4.   Are you troubled by shortness of breath when:  walking on a level surface, or up a slight hill, or at night? No   5.  Do you currently have a cold, bronchitis or other respiratory infection? No   6.  Do you have a cough, shortness of breath, or wheezing? No   7.  Do you sometimes get pains in the calves of your legs when you walk? No   8. Do you or anyone in your family have previous history of blood clots? No   9.  Do you or does anyone in your family have a serious bleeding problem such as prolonged bleeding following surgeries or cuts? No   10. Have you ever had problems with anemia or been told to take iron pills? No   11. Have you had any abnormal blood loss such as black, tarry or bloody stools, or abnormal vaginal bleeding? No   12. Have you ever had a blood transfusion? No   13. Have you or any of your relatives ever had problems with  anesthesia? YES - reports reaction to antibiotics. Unsure if this was while under anesthesia.   14. Do you have sleep apnea, excessive snoring or daytime drowsiness? No   15. Do you have any prosthetic heart valves? No   16. Do you have prosthetic joints? No   17. Is there any chance that you may be pregnant? No       HPI:                                                      Brief HPI related to upcoming procedure: Egg retrieval for IVF      HYPOTHYROIDISM - Patient has a longstanding history of chronic Hypothyroidism. Patient has been doing well, noting no tremor, insomnia, hair loss or changes in skin texture.  Continues to take medications as directed, without adverse reactions or side effects.                TSH   Date Value Ref Range Status   2017 0.90 0.40 - 4.00 mU/L Final   ]                                                                                                                                                                                                            .    MEDICAL HISTORY:                                                    Patient Active Problem List    Diagnosis Date Noted     Indication for care in labor and delivery, antepartum 2017     Priority: Medium     Spontaneous  in second trimester 2017     Priority: Medium      premature rupture of membranes (PPROM) with onset of labor after 24 hours of rupture in second trimester, antepartum 2017     Priority: Medium     Fetal demise before 22 weeks with retention of dead fetus 2017     Priority: Medium     Encounter for triage in pregnant patient 2017     Priority: Medium     PROM (premature rupture of membranes) 2017     Priority: Medium     Triplet gestation 2017     Priority: Medium     A with unbalanced translocation.   Reduction performed 17  B&C are suspected mono-di       High-risk pregnancy 2017     Priority: Medium     Subclinical hypothyroidism 2016  "    Priority: Medium      Past Medical History:   Diagnosis Date     NO ACTIVE PROBLEMS      Subclinical hypothyroidism 4/29/2016     Past Surgical History:   Procedure Laterality Date     SINUS SURGERY       Current Outpatient Prescriptions   Medication Sig Dispense Refill     levothyroxine (SYNTHROID/LEVOTHROID) 50 MCG tablet Take 1 tablet (50 mcg) by mouth daily 90 tablet 1     Multiple Vitamins-Minerals (MULTIVITAMIN PO)        OTC products: Co q 10, DHEA, baby aspirin and injections per [facility].    Allergies   Allergen Reactions     Sulfa Drugs       Latex Allergy: NO    Social History   Substance Use Topics     Smoking status: Never Smoker     Smokeless tobacco: Never Used     Alcohol use No      Comment: occ     History   Drug Use No       REVIEW OF SYSTEMS:                                                    Constitutional, neuro, ENT, endocrine, pulmonary, cardiac, gastrointestinal, genitourinary, musculoskeletal, integument and psychiatric systems are negative, except as otherwise noted.      EXAM:                                                    /80 (Cuff Size: Adult Regular)  Pulse 80  Temp 98  F (36.7  C) (Oral)  Ht 5' 9\" (1.753 m)  Wt 162 lb (73.5 kg)  LMP 10/04/2017 (Approximate)  BMI 23.92 kg/m2    GENERAL APPEARANCE: healthy, alert and no distress     EYES: EOMI, PERRL     HENT: ear canals and TM's normal and nose and mouth without ulcers or lesions     NECK: no adenopathy, no asymmetry, masses, or scars and thyroid normal to palpation     RESP: lungs clear to auscultation - no rales, rhonchi or wheezes     CV: regular rates and rhythm, normal S1 S2, no S3 or S4 and no murmur, click or rub     ABDOMEN:  soft, nontender, no HSM or masses and bowel sounds normal     MS: extremities normal- no gross deformities noted, no evidence of inflammation in joints, FROM in all extremities.     SKIN: no suspicious lesions or rashes     NEURO: Normal strength and tone, sensory exam " grossly normal, mentation intact and speech normal     PSYCH: mentation appears normal. and affect normal/bright     LYMPHATICS: No axillary, cervical, or supraclavicular nodes    DIAGNOSTICS:                                                    EKG: Not indicated due to non-vascular surgery and low risk of event (age <65 and without cardiac risk factors)    Recent Labs   Lab Test  06/22/17   0618  06/21/17   1025  06/18/17   0723  06/18/17   0620   HGB  10.6*  10.7*   --   11.7   PLT   --   235   --   246   INR   --    --   1.02   --         IMPRESSION:                                                    Reason for surgery/procedure: Egg retrieval for IVF    The proposed surgical procedure is considered INTERMEDIATE risk.    REVISED CARDIAC RISK INDEX  The patient has the following serious cardiovascular risks for perioperative complications such as (MI, PE, VFib and 3  AV Block):  No serious cardiac risks  INTERPRETATION: 0 risks: Class I (very low risk - 0.4% complication rate)    The patient has the following additional risks for perioperative complications:  No identified additional risks      ICD-10-CM    1. Preop general physical exam Z01.818    2. Infertility management Z31.9        RECOMMENDATIONS:                                                      APPROVAL GIVEN to proceed with proposed procedure, without further diagnostic evaluation     Signed Electronically by: Trudi Agrawal PA-C    Copy of this evaluation report is provided to requesting physician.    [facility] Preop Guidelines

## 2017-10-10 NOTE — PROGRESS NOTES
Chart reviewed.  Encounter was not reviewed with provider.  Patient was not examined by me.  Alexandra Molina MD

## 2017-11-10 ENCOUNTER — TRANSFERRED RECORDS (OUTPATIENT)
Dept: HEALTH INFORMATION MANAGEMENT | Facility: CLINIC | Age: 32
End: 2017-11-10

## 2017-11-20 DIAGNOSIS — E03.8 SUBCLINICAL HYPOTHYROIDISM: ICD-10-CM

## 2017-11-20 RX ORDER — LEVOTHYROXINE SODIUM 50 UG/1
50 TABLET ORAL DAILY
Qty: 90 TABLET | Refills: 1 | Status: SHIPPED | OUTPATIENT
Start: 2017-11-20 | End: 2018-05-21

## 2017-11-20 NOTE — TELEPHONE ENCOUNTER
Signed Prescriptions:                        Disp   Refills    levothyroxine (SYNTHROID/LEVOTHROID) 50 MC*90 tab*1        Sig: Take 1 tablet (50 mcg) by mouth daily  Authorizing Provider: ESTUARDO WARREN  Ordering User: LENI GIRARD    Preop 10/2017. Last TSH was 4/2017 and normal. Rx sent.  Leni Girard

## 2017-12-12 ENCOUNTER — TRANSFERRED RECORDS (OUTPATIENT)
Dept: HEALTH INFORMATION MANAGEMENT | Facility: CLINIC | Age: 32
End: 2017-12-12

## 2017-12-19 PROBLEM — Z36.89 ENCOUNTER FOR TRIAGE IN PREGNANT PATIENT: Status: RESOLVED | Noted: 2017-06-18 | Resolved: 2017-12-19

## 2017-12-19 PROBLEM — O09.90 HIGH-RISK PREGNANCY: Status: RESOLVED | Noted: 2017-04-24 | Resolved: 2017-12-19

## 2017-12-22 ENCOUNTER — TRANSFERRED RECORDS (OUTPATIENT)
Dept: HEALTH INFORMATION MANAGEMENT | Facility: CLINIC | Age: 32
End: 2017-12-22

## 2018-04-13 ENCOUNTER — OFFICE VISIT (OUTPATIENT)
Dept: OBGYN | Facility: CLINIC | Age: 33
End: 2018-04-13
Payer: COMMERCIAL

## 2018-04-13 VITALS
HEIGHT: 69 IN | DIASTOLIC BLOOD PRESSURE: 68 MMHG | TEMPERATURE: 97.7 F | SYSTOLIC BLOOD PRESSURE: 111 MMHG | HEART RATE: 92 BPM | WEIGHT: 163.6 LBS | BODY MASS INDEX: 24.23 KG/M2

## 2018-04-13 DIAGNOSIS — Z01.419 ENCOUNTER FOR GYNECOLOGICAL EXAMINATION WITHOUT ABNORMAL FINDING: Primary | ICD-10-CM

## 2018-04-13 PROCEDURE — 99395 PREV VISIT EST AGE 18-39: CPT | Performed by: OBSTETRICS & GYNECOLOGY

## 2018-04-13 NOTE — NURSING NOTE
"Chief Complaint   Patient presents with     Physical       Initial /68  Pulse 92  Temp 97.7  F (36.5  C) (Oral)  Ht 5' 9\" (1.753 m)  Wt 163 lb 9.6 oz (74.2 kg)  LMP 2018  BMI 24.16 kg/m2 Estimated body mass index is 24.16 kg/(m^2) as calculated from the following:    Height as of this encounter: 5' 9\" (1.753 m).    Weight as of this encounter: 163 lb 9.6 oz (74.2 kg).  BP completed using cuff size: regular        The following HM Due: NONE      The following patient reported/Care Every where data was sent to:  P ABSTRACT QUALITY INITIATIVES [32106]        patient has appointment for today    Lucrecia Fernandez CMA                "

## 2018-04-13 NOTE — PROGRESS NOTES
Lolis is a 33 year old  female who presents for annual exam.     Menses are regular q 28-30 days and normal lasting 5 days.  Menses flow: normal.  Patient's last menstrual period was 2018.. Using none for contraception.  She is currently considering pregnancy.  Besides routine health maintenance, she has no other health concerns today . Currently in the process of pursuing IVF at [facility], but also starting adoption process just in case.   GYNECOLOGIC HISTORY:  Menarche: [age]    Lolis is sexually active with 1 male partner(s) and is currently in monogamous relationship.    History sexually transmitted infections:No STD history  STI testing offered?  Declined  TEO exposure: No  History of abnormal Pap smear: NO - age 30- 65 PAP every 3 years recommended  Family history of breast CA: Yes (Please explain): pgmo  Family history of uterine/ovarian CA: No    Family history of colon CA: No    HEALTH MAINTENANCE:  Cholesterol: (No results found for: CHOL History of abnormal lipids: No  Mammo: no hx . History of abnormal Mammo: Not applicable.  Regular Self Breast Exams: No  Calcium/Vitamin D intake: source:  dairy, dietary supplement(s) Adequate? Yes  TSH: (  TSH   Date Value Ref Range Status   2017 0.90 0.40 - 4.00 mU/L Final    )  Pap; (  Lab Results   Component Value Date    PAP NIL 2017    PAP NIL 2014    )    HISTORY:  Obstetric History       T0      L0     SAB0   TAB0   Ectopic0   Multiple1   Live Births0       # Outcome Date GA Lbr Jose/2nd Weight Sex Delivery Anes PTL Lv   1A Para 17 19w1d  9.8 oz (0.278 kg) M Vag-Spont Nitrous,IV REGIONAL Y FD      Name: JERONIMO LOPEZ FD      Complications: Prolonged PROM (>18 hours)      Apgar1:  0                Apgar5: 0   1B Para 17 19w1d  6.6 oz (0.188 kg) F Vag-Spont IV REGIONAL,Nitrous Y FD      Name: SAE LOPEZ FD      Complications: Prolonged PROM (>18 hours)      Apgar1:  0                Apgar5: 0        Past  "Medical History:   Diagnosis Date     NO ACTIVE PROBLEMS      Subclinical hypothyroidism 4/29/2016     Past Surgical History:   Procedure Laterality Date     SINUS SURGERY       Family History   Problem Relation Age of Onset     Breast Cancer Paternal Grandmother      Social History     Social History     Marital status:      Spouse name: N/A     Number of children: N/A     Years of education: N/A     Social History Main Topics     Smoking status: Never Smoker     Smokeless tobacco: Never Used     Alcohol use No      Comment: occ     Drug use: No     Sexual activity: Yes     Partners: Male     Birth control/ protection:      Other Topics Concern     Parent/Sibling W/ Cabg, Mi Or Angioplasty Before 65f 55m? No     Social History Narrative       Current Outpatient Prescriptions:      levothyroxine (SYNTHROID/LEVOTHROID) 50 MCG tablet, Take 1 tablet (50 mcg) by mouth daily, Disp: 90 tablet, Rfl: 1     Multiple Vitamins-Minerals (MULTIVITAMIN PO), , Disp: , Rfl:      Allergies   Allergen Reactions     Sulfa Drugs        Past medical, surgical, social and family history were reviewed and updated in EPIC.    ROS:   C:     NEGATIVE for fever, chills, change in weight  I:       NEGATIVE for worrisome rashes, moles or lesions  E:     NEGATIVE for vision changes or irritation  E/M: NEGATIVE for ear, mouth and throat problems  R:     NEGATIVE for significant cough or SOB  CV:   NEGATIVE for chest pain, palpitations or peripheral edema  GI:     NEGATIVE for nausea, abdominal pain, heartburn, or change in bowel habits  :   NEGATIVE for frequency, dysuria, hematuria, vaginal discharge, or irregular bleeding  M:     NEGATIVE for significant arthralgias or myalgia  N:      NEGATIVE for weakness, dizziness or paresthesias  E:      NEGATIVE for temperature intolerance, skin/hair changes  P:      NEGATIVE for changes in mood or affect.    EXAM:  /68  Pulse 92  Temp 97.7  F (36.5  C) (Oral)  Ht 5' 9\" (1.753 m)  Wt " 163 lb 9.6 oz (74.2 kg)  LMP 03/30/2018  BMI 24.16 kg/m2   BMI: Body mass index is 24.16 kg/(m^2).  Constitutional: healthy, alert and no distress  Head: Normocephalic. No masses, lesions, tenderness or abnormalities  Neck: Neck supple. Trachea midline. No adenopathy. Thyroid symmetric, normal size.   Cardiovascular: RRR.   Respiratory: Negative.   Breast: Breasts reveal mild symmetric fibrocystic densities, but there are no dominant, discrete, fixed or suspicious masses found.  Gastrointestinal: Abdomen soft, non-tender, non-distended. No masses, organomegaly.  :  Vulva:  No external lesions, normal female hair distribution, no inguinal adenopathy.    Urethra:  Midline, non-tender, well supported, no discharge  Vagina:  Moist, pink, no abnormal discharge, no lesions  Uterus:  Normal size, anteverted , non-tender, freely mobile  Ovaries:  No masses appreciated, non-tender, mobile  Rectal Exam: deferred  Musculoskeletal: extremities normal  Skin: no suspicious lesions or rashes  Psychiatric: Affect appropriate, cooperative,mentation appears normal.     COUNSELING:   Reviewed preventive health counseling, as reflected in patient instructions       Regular exercise       Healthy diet/nutrition       Family planning       Folic Acid Counseling   reports that she has never smoked. She has never used smokeless tobacco.    Body mass index is 24.16 kg/(m^2).    FRAX Risk Assessment    ASSESSMENT:  33 year old female with satisfactory annual exam  (Z01.419) Encounter for gynecological examination without abnormal finding  (primary encounter diagnosis)  Comment:    Plan: No contraindication to adoption.  Available to answer fertility/pregnancy questions as needed.

## 2018-04-13 NOTE — MR AVS SNAPSHOT
"              After Visit Summary   4/13/2018    Lolis Johnson    MRN: [id number]           Patient Information     Date Of Birth          1985        Visit Information        Provider Department      4/13/2018 2:00 PM Christiane Lu MD [facility]        Today's Diagnoses     Encounter for gynecological examination without abnormal finding    -  1       Follow-ups after your visit        Who to contact     If you have questions or need follow up information about today's clinic visit or your schedule please contact [facility] [place] at [phone].  Normal or non-critical lab and imaging results will be communicated to you by [employer], letter or phone within 4 business days after the clinic has received the results. If you do not hear from us within 7 days, please contact the clinic through [phone] or phone. If you have a critical or abnormal lab result, we will notify you by phone as soon as possible.  Submit refill requests through [employer] or call your pharmacy and they will forward the refill request to us. Please allow 3 business days for your refill to be completed.          Additional Information About Your Visit        MyChart Information     [employer] gives you secure access to your electronic health record. If you see a primary care provider, you can also send messages to your care team and make appointments. If you have questions, please call your primary care clinic.  If you do not have a primary care provider, please call [phone] and they will assist you.        Care EveryWhere ID     This is your Care EveryWhere ID. This could be used by other organizations to access your [facility] medical records  [id number]        Your Vitals Were     Pulse Temperature Height Last Period BMI (Body Mass Index)       92 97.7  F (36.5  C) (Oral) 5' 9\" (1.753 m) 03/30/2018 24.16 kg/m2        Blood Pressure from Last 3 Encounters:   04/13/18 111/68   10/09/17 100/80 "   06/29/17 102/67    Weight from Last 3 Encounters:   04/13/18 163 lb 9.6 oz (74.2 kg)   10/09/17 162 lb (73.5 kg)   06/29/17 171 lb 8 oz (77.8 kg)              Today, you had the following     No orders found for display       Primary Care Provider Office Phone # Fax #    Christiane Xuan Lu [phone]       [address] [facility] [address]  [facility] [address]        Equal Access to Services     [facility] SYLVESTER : Hadii aad ku hadasho Soomaali, waaxda luqadaha, qaybta kaalmada adeegyada, waxay idiin hayaan adequique britt . So [facility] [phone].    ATENCIÓN: Si habla español, tiene a powers disposición servicios gratuitos de asistencia lingüística. [facility] [phone].    We comply with applicable federal civil rights laws and [place] laws. We do not discriminate on the basis of race, color, national origin, age, disability, sex, sexual orientation, or gender identity.            Thank you!     Thank you for choosing [facility] [place]  for your care. Our goal is always to provide you with excellent care. Hearing back from our patients is one way we can continue to improve our services. Please take a few minutes to complete the written survey that you may receive in the mail after your visit with us. Thank you!             Your Updated Medication List - Protect others around you: Learn how to safely use, store and throw away your medicines at www.disposemymeds.org.          This list is accurate as of 4/13/18 11:59 PM.  Always use your most recent med list.                   Brand Name Dispense Instructions for use Diagnosis    levothyroxine 50 MCG tablet    SYNTHROID/LEVOTHROID    90 tablet    Take 1 tablet (50 mcg) by mouth daily    Subclinical hypothyroidism       MULTIVITAMIN PO

## 2018-05-13 ENCOUNTER — TRANSFERRED RECORDS (OUTPATIENT)
Dept: HEALTH INFORMATION MANAGEMENT | Facility: CLINIC | Age: 33
End: 2018-05-13

## 2018-05-18 ENCOUNTER — TRANSFERRED RECORDS (OUTPATIENT)
Dept: HEALTH INFORMATION MANAGEMENT | Facility: CLINIC | Age: 33
End: 2018-05-18

## 2018-05-21 DIAGNOSIS — E03.8 SUBCLINICAL HYPOTHYROIDISM: ICD-10-CM

## 2018-05-21 RX ORDER — LEVOTHYROXINE SODIUM 50 UG/1
50 TABLET ORAL DAILY
Qty: 90 TABLET | Refills: 1 | Status: SHIPPED | OUTPATIENT
Start: 2018-05-21 | End: 2018-09-14

## 2018-08-22 ENCOUNTER — TRANSFERRED RECORDS (OUTPATIENT)
Dept: HEALTH INFORMATION MANAGEMENT | Facility: CLINIC | Age: 33
End: 2018-08-22

## 2018-08-28 ENCOUNTER — PRENATAL OFFICE VISIT (OUTPATIENT)
Dept: NURSING | Facility: CLINIC | Age: 33
End: 2018-08-28
Payer: COMMERCIAL

## 2018-08-28 VITALS
HEIGHT: 69 IN | TEMPERATURE: 98.9 F | DIASTOLIC BLOOD PRESSURE: 75 MMHG | HEART RATE: 83 BPM | WEIGHT: 165 LBS | BODY MASS INDEX: 24.44 KG/M2 | SYSTOLIC BLOOD PRESSURE: 107 MMHG

## 2018-08-28 DIAGNOSIS — O26.20: Primary | ICD-10-CM

## 2018-08-28 DIAGNOSIS — Z23 NEED FOR TDAP VACCINATION: ICD-10-CM

## 2018-08-28 PROBLEM — O30.90: Status: ACTIVE | Noted: 2018-08-28

## 2018-08-28 PROBLEM — Z36.0: Status: ACTIVE | Noted: 2018-08-28

## 2018-08-28 PROBLEM — O31.30X0: Status: ACTIVE | Noted: 2018-08-28

## 2018-08-28 PROBLEM — O35.10X0 CHROMOSOMAL ABNORMALITY IN FETUS AFFECTING CARE OF MOTHER: Status: ACTIVE | Noted: 2018-08-28

## 2018-08-28 LAB
ABO + RH BLD: NORMAL
ABO + RH BLD: NORMAL
ALBUMIN UR-MCNC: NEGATIVE MG/DL
APPEARANCE UR: CLEAR
BILIRUB UR QL STRIP: NEGATIVE
BLD GP AB SCN SERPL QL: NORMAL
BLOOD BANK CMNT PATIENT-IMP: NORMAL
COLOR UR AUTO: YELLOW
ERYTHROCYTE [DISTWIDTH] IN BLOOD BY AUTOMATED COUNT: 12.3 % (ref 10–15)
GLUCOSE UR STRIP-MCNC: NEGATIVE MG/DL
HBV SURFACE AG SERPL QL IA: NONREACTIVE
HCT VFR BLD AUTO: 40.7 % (ref 35–47)
HGB BLD-MCNC: 13.6 G/DL (ref 11.7–15.7)
HGB UR QL STRIP: NEGATIVE
HIV 1+2 AB+HIV1 P24 AG SERPL QL IA: NONREACTIVE
KETONES UR STRIP-MCNC: NEGATIVE MG/DL
LEUKOCYTE ESTERASE UR QL STRIP: NEGATIVE
MCH RBC QN AUTO: 32.6 PG (ref 26.5–33)
MCHC RBC AUTO-ENTMCNC: 33.4 G/DL (ref 31.5–36.5)
MCV RBC AUTO: 98 FL (ref 78–100)
NITRATE UR QL: NEGATIVE
PH UR STRIP: 5.5 PH (ref 5–7)
PLATELET # BLD AUTO: 221 10E9/L (ref 150–450)
RBC # BLD AUTO: 4.17 10E12/L (ref 3.8–5.2)
SOURCE: NORMAL
SP GR UR STRIP: <=1.005 (ref 1–1.03)
SPECIMEN EXP DATE BLD: NORMAL
TSH SERPL DL<=0.005 MIU/L-ACNC: 3.34 MU/L (ref 0.4–4)
UROBILINOGEN UR STRIP-ACNC: 0.2 EU/DL (ref 0.2–1)
WBC # BLD AUTO: 6.2 10E9/L (ref 4–11)

## 2018-08-28 PROCEDURE — 36415 COLL VENOUS BLD VENIPUNCTURE: CPT | Performed by: OBSTETRICS & GYNECOLOGY

## 2018-08-28 PROCEDURE — 87340 HEPATITIS B SURFACE AG IA: CPT | Performed by: OBSTETRICS & GYNECOLOGY

## 2018-08-28 PROCEDURE — 86762 RUBELLA ANTIBODY: CPT | Performed by: OBSTETRICS & GYNECOLOGY

## 2018-08-28 PROCEDURE — 85027 COMPLETE CBC AUTOMATED: CPT | Performed by: OBSTETRICS & GYNECOLOGY

## 2018-08-28 PROCEDURE — 86901 BLOOD TYPING SEROLOGIC RH(D): CPT | Performed by: OBSTETRICS & GYNECOLOGY

## 2018-08-28 PROCEDURE — 87389 HIV-1 AG W/HIV-1&-2 AB AG IA: CPT | Performed by: OBSTETRICS & GYNECOLOGY

## 2018-08-28 PROCEDURE — 86780 TREPONEMA PALLIDUM: CPT | Performed by: OBSTETRICS & GYNECOLOGY

## 2018-08-28 PROCEDURE — 86900 BLOOD TYPING SEROLOGIC ABO: CPT | Performed by: OBSTETRICS & GYNECOLOGY

## 2018-08-28 PROCEDURE — 87086 URINE CULTURE/COLONY COUNT: CPT | Performed by: OBSTETRICS & GYNECOLOGY

## 2018-08-28 PROCEDURE — 99207 ZZC NO CHARGE NURSE ONLY: CPT

## 2018-08-28 PROCEDURE — 84443 ASSAY THYROID STIM HORMONE: CPT | Performed by: OBSTETRICS & GYNECOLOGY

## 2018-08-28 PROCEDURE — 81003 URINALYSIS AUTO W/O SCOPE: CPT | Performed by: OBSTETRICS & GYNECOLOGY

## 2018-08-28 PROCEDURE — 86850 RBC ANTIBODY SCREEN: CPT | Performed by: OBSTETRICS & GYNECOLOGY

## 2018-08-28 RX ORDER — PRENATAL VIT/IRON FUM/FOLIC AC 27MG-0.8MG
1 TABLET ORAL DAILY
COMMUNITY
End: 2020-10-20

## 2018-08-28 RX ORDER — ESTRADIOL 2 MG/1
TABLET ORAL
Refills: 1 | COMMUNITY
Start: 2018-03-21 | End: 2018-09-14

## 2018-08-28 RX ORDER — CETIRIZINE HYDROCHLORIDE 10 MG/1
10 TABLET ORAL
COMMUNITY
End: 2019-10-10

## 2018-08-28 NOTE — MR AVS SNAPSHOT
After Visit Summary   8/28/2018    Lolis Johnson    MRN: [id number]           Patient Information     Date Of Birth          1985        Visit Information        Provider Department      8/28/2018 8:15 AM RD OB NURSE EDUCATION [facility]        Today's Diagnoses     High risk pregnancy due to recurrent pregnancy loss    -  1    Need for Tdap vaccination           Follow-ups after your visit        Additional Services     MAT FETAL MED CTR REFERRAL-PREGNANCY       There is no height or weight on file to calculate BMI.    >> Patient may proceed with recommendations for further testing as directed by the Maternal Fetal Medicine Specialist >>    >> If requesting Fetal Echo: MFM will determine appropriate location for exam due to indication.    >> If requesting Lung Maturity Amnio:  If results indicate fetal lung maturity, induction or C/S is recommended within 36 hours.  Please schedule accordingly.     Dear Patient:   Please be aware that coverage of these services is subject to the terms and limitations of your health insurance plan.  Call member services at your health plan with any benefit or coverage questions.      Please bring the following to your appointment:    >>  Any x-rays, CTs or MRIs which have been performed.  Contact the facility where they were done to arrange for  prior to your scheduled appointment.  Any new CT, MRI or other procedures ordered by your specialist must be performed at a [place] facility or coordinated by your clinic's referral office.  >>  List of current medications   >>  This referral request   >>  Any documents/labs given to you for this referral                  Your next 10 appointments already scheduled     Sep 14, 2018  2:00 PM CDT   New Prenatal with Christiane Lu MD   [facility] ([facility])    [address] [place] [address] [phone]              Who to  "contact     If you have questions or need follow up information about today's clinic visit or your schedule please contact [facility] directly at [phone].  Normal or non-critical lab and imaging results will be communicated to you by MyChart, letter or phone within 4 business days after the clinic has received the results. If you do not hear from us within 7 days, please contact the clinic through [employer] or phone. If you have a critical or abnormal lab result, we will notify you by phone as soon as possible.  Submit refill requests through [employer] or call your pharmacy and they will forward the refill request to us. Please allow 3 business days for your refill to be completed.          Additional Information About Your Visit        [employer] Information     [employer] gives you secure access to your electronic health record. If you see a primary care provider, you can also send messages to your care team and make appointments. If you have questions, please call your primary care clinic.  If you do not have a primary care provider, please call [phone] and they will assist you.        Care EveryWhere ID     This is your Care EveryWhere ID. This could be used by other organizations to access your [place] medical records  [id number]        Your Vitals Were     Pulse Temperature Height BMI (Body Mass Index)          83 98.9  F (37.2  C) 5' 9\" (1.753 m) 24.37 kg/m2         Blood Pressure from Last 3 Encounters:   08/28/18 107/75   04/13/18 111/68   10/09/17 100/80    Weight from Last 3 Encounters:   08/28/18 165 lb (74.8 kg)   04/13/18 163 lb 9.6 oz (74.2 kg)   10/09/17 162 lb (73.5 kg)              We Performed the Following     ABO/Rh type and screen     CBC with platelets     Hepatitis B surface antigen     HIV Antigen Antibody Combo     MAT FETAL MED CTR REFERRAL-PREGNANCY     Rubella Antibody IgG Quantitative     Treponema Abs w Reflex to RPR and Titer     TSH with free T4 reflex     UA " without Microscopic     Urine Culture Aerobic Bacterial          Today's Medication Changes          These changes are accurate as of 8/28/18  8:51 AM.  If you have any questions, ask your nurse or doctor.               Stop taking these medicines if you haven't already. Please contact your care team if you have questions.     MULTIVITAMIN PO           progesterone 200 MG capsule   Commonly known as:  PROMETRIUM                    Primary Care Provider Office Phone # Fax #    Christiane Xuan Lu [phone]       [address] [facility] [address]  [facility] [address]        Equal Access to Services     [facility]: Hadii aad ku hadasho Soomaali, waaxda luqadaha, qaybta kaalmada adeegyada, waxjorge britt . So [facility] [phone].    ATENCIÓN: Si habla español, tiene a powers disposición servicios gratuitos de asistencia lingüística. [facility] [phone].    We comply with applicable federal civil rights laws and [place] laws. We do not discriminate on the basis of race, color, national origin, age, disability, sex, sexual orientation, or gender identity.            Thank you!     Thank you for choosing [facility]  for your care. Our goal is always to provide you with excellent care. Hearing back from our patients is one way we can continue to improve our services. Please take a few minutes to complete the written survey that you may receive in the mail after your visit with us. Thank you!             Your Updated Medication List - Protect others around you: Learn how to safely use, store and throw away your medicines at www.disposemymeds.org.          This list is accurate as of 8/28/18  8:51 AM.  Always use your most recent med list.                   Brand Name Dispense Instructions for use Diagnosis    cetirizine 10 MG tablet    zyrTEC     Take 10 mg by mouth        estradiol 2 MG tablet    ESTRACE     TAKE 1 TABLET BY MOUTH TWICE A DAY AS DIRECTED         levothyroxine 50 MCG tablet    SYNTHROID/LEVOTHROID    90 tablet    Take 1 tablet (50 mcg) by mouth daily    Subclinical hypothyroidism       prenatal multivitamin plus iron 27-0.8 MG Tabs per tablet      Take 1 tablet by mouth daily

## 2018-08-28 NOTE — PROGRESS NOTES
Important Information for Provider:     Patient present for new ob teaching and labs, second pregnancy. Recently lost triplets  at 19 weeks, 6/21/17. [facility] seen at [facility], IVF, last ultrasound was 8/22/18. Ordered first trimester screening. Recommended B6, Unisom for nausea. Has NOB appointment with Dr Lu 9/14/18. TSH drawn today with NOB labs. Patient has appointment with [facility] to recheck hormone levels.      Caffeine intake/servings daily - 12 oz  Calcium intake/servings daily - 3  Exercise 5 times weekly - describe ; walks, precautions goven  Sunscreen used - Yes  Seatbelts used - Yes  Guns stored in the home - No  Self Breast Exam - Yes  Pap test up to date -  Yes  Eye exam up to date -  Yes  Dental exam up to date -  Yes  DEXA scan up to date -  No  Flex Sig/Colonoscopy up to date -  No  Mammography up to date -  No  Immunizations reviewed and up to date - Yes  Abuse: Current or Past (Physical, Sexual or Emotional) - No  Do you feel safe in your environment - Yes  Do you cope well with stress - Yes  Do you suffer from insomnia - No      Prenatal OB Questionnaire  Patient supplied answers from flow sheet for:  Prenatal OB Questionnaire.  Past Medical History  Diabetes?: No  Hypertension : No  Heart disease, mitral valve prolapse or rheumatic fever?: No  An autoimmune disease such as lupus or rheumatoid arthritis?: No  Kidney disease or urinary tract infection?: No  Epilepsy, seizures or spells?: No  Migraine headaches?: No  A stroke or loss of function or sensation?: No  Any other neurological problems?: No  Have you ever been treated for depression?: No  Are you having problems with crying spells or loss of self-esteem?: No  Have you ever required psychiatric care?: (!) Yes (after pregnancy loss 2017)  Have you ever had hepatitis, liver disease or jaundice?: No  Have you been treated for blood clots in your veins, deep vein thromosis, inflammation in the veins, thrombosis,  phlebitis, pulmonary embolism or varicosities?: No  Have you had excessive bleeding after surgery or dental work?: No  Do you bleed more than other women after a cut or scratch?: No  Do you have a history of anemia?: No  Have you ever had thyroid problems or taken thyroid medication?: (!) Yes (takes levothyroxine )   Do you have any endocrine problems?: No  Have you ever been in a major accident or suffered serious trauma?: No  Within the last year, has anyone hit, slapped, kicked or otherwise hurt you?: No  In the last year, has anyone forced you to have sex when you didn't want to?: No    Past Medical History 2   Have you ever received a blood transfusion?: No  Would you refuse a blood transfusion if a doctor judged it to be medically necessary?: No   If you answered Yes, would you rather die than receive a blood transfusion?: No  If you answered Yes, is this for Yazdanism reasons?: No  Does anyone in your home smoke?: No  Do you use tobacco products?: No  Do you drink beer, wine or hard liquor?: No  Do you use any of the following: marijuana, speed, cocaine, heroin, hallucinogens or other drugs?: No   Is your blood type Rh negative?: No  Have you ever had abnormal antibodies in your blood?: No  Have you ever had asthma?: No  Have you ever had tuberculosis?: No  Do you have any allergies to drugs or over-the-counter medications?: (!) Yes (sulfa)  Have you had any breast problems?: No  Have you ever ?: No  Have you had any gynecological surgical procedures such as cervical conization, a LEEP procedure, laser treatment, cryosurgery of the cervix or a dilation and curettage, etc?: No  Have you ever had any other surgical procedures?: (!) Yes (sinus surgery)  Have you been hospitalized for a nonsurgical reason excluding normal delivery?: No  Have you ever had any anesthetic complications?: No  Have you ever had an abnormal pap smear?: No    Past Medical History (Continued)  Do you have a history of  abnormalities of the uterus?: No  Did your mother take TEO or any other hormones when she was pregnant with you?: No  Did it take you more than a year to become pregnant?: (!) Yes  Have you ever been evaluated or treated for infertility?: (!) YES  Is there a history of medical problems in your family, which you feel may be important to this pregnancy?: No  Do you have any other problems we have not asked about which you feel may be important to this pregnancy?: No    Symptoms since last menstrual period  Do you have any of the following symptoms: abdominal pain, blood in stools or urine, chest pain, shortness of breath, coughing or vomiting up blood, your heart racing or skipping beats, nausea and vomiting, pain on urination or vaginal discharge or bleed: (!) Yes (nausea)  Will the patient be 35 years old or older at the time of delivery?: No    Has the patient, baby's father or anyone in either family had:  Thalassemia (Italian, Greek, Mediterranean or  background only) and an MCV result less than 80?: No  Neural tube defect such as meningomyelocele, spina bifida or anencephaly?: No  Congenital heart defect?: No  Down's Syndrome?: No  Erlin-Sachs disease (Yazdanism, Cajun, Cook Islander-Slope)?: No  Sickle cell disease or trait ()?: No  Hemophilia or other inherited problems of blood?: No  Muscular dystrophy?: No  Cystic fibrosis?: No  Jessamine's chorea?: (!) Yes ('s uncle has mental retardation)  Mental retardation/autism?: No  If yes, was the person tested for fragile X?: No  Any other inherited genetic or chromosomal disorder?: (!) Yes (FOB tested for PGD,  balanced chromosome)  Maternal metabolic disorder (e.g Insulin-dependent diabetes, PKU)?: No  A child with birth defects not listed above?: No  Recurrent pregnancy loss or stillbirth?: No   Has the patient had any medications/street drugs/alcohol since her last menstrual period?: No  Does the patient or baby's father have any other genetic risks?:  No    Infection History   Do you object to being tested for Hepatitis B?: No  Do you object to being tested for HIV?: No   Do you feel that you are at high risk for coming in contact with the AIDS virus?: No  Have you ever been treated for tuberculosis?: No  Have you ever had a positive skin test for tuberculosis?: No  Do you live with someone who has tuberculosis?: No  Have you ever been exposed to tuberculosis?: No  Do you have genital herpes?: No  Does your partner have genital herpes?: No  Have you had a viral illness since your last period?: No  Have you ever had gonorrhea, chlamydia, syphilis, venereal warts, trichomoniasis, pelvic inflammatory disease or any other sexually transmitted disease?: No  Do you know if you are a genital group B streptococcus carrier?: No  Have you had chicken pox/varicella?: (!) Yes   Have you been vaccinated against chicken Pox?: No  Have you had any other infectious diseases?: No      Allergies as of 8/28/2018:    Allergies as of 08/28/2018 - Alex as Reviewed 04/13/2018   Allergen Reaction Noted     Sulfa drugs  06/06/2013       Current medications are:  Current Outpatient Prescriptions   Medication Sig Dispense Refill     Prenatal Vit-Fe Fumarate-FA (PRENATAL MULTIVITAMIN PLUS IRON) 27-0.8 MG TABS per tablet Take 1 tablet by mouth daily       cetirizine (ZYRTEC) 10 MG tablet Take 10 mg by mouth       estradiol (ESTRACE) 2 MG tablet TAKE 1 TABLET BY MOUTH TWICE A DAY AS DIRECTED  1     levothyroxine (SYNTHROID/LEVOTHROID) 50 MCG tablet Take 1 tablet (50 mcg) by mouth daily 90 tablet 1         Early ultrasound screening tool:    Does patient have irregular periods?  No  Did patient use hormonal birth control in the three months prior to positive urine pregnancy test? No  Is the patient breastfeeding?  No  Is the patient 10 weeks or greater at time of education visit?  No

## 2018-08-29 LAB
BACTERIA SPEC CULT: NORMAL
RUBV IGG SERPL IA-ACNC: 77 IU/ML
SPECIMEN SOURCE: NORMAL
T PALLIDUM AB SER QL: NONREACTIVE

## 2018-08-30 DIAGNOSIS — E03.8 OTHER SPECIFIED HYPOTHYROIDISM: Primary | ICD-10-CM

## 2018-08-30 RX ORDER — LEVOTHYROXINE SODIUM 75 UG/1
75 TABLET ORAL DAILY
Qty: 30 TABLET | Refills: 1 | Status: SHIPPED | OUTPATIENT
Start: 2018-08-30 | End: 2018-10-23

## 2018-08-30 NOTE — PROGRESS NOTES
Gilbert Danielle,  Congratulations! I am soooooo happy for you!!  Your TSH is just a little higher than I would like. Are you still on 50? I'd like you to go up to 75. I'll send that in for you, but let me know if your dose is different now.  Your prenatal labs are normal.  I look forward to seeing you soon.  Dr. Lu

## 2018-09-14 ENCOUNTER — PRENATAL OFFICE VISIT (OUTPATIENT)
Dept: OBGYN | Facility: CLINIC | Age: 33
End: 2018-09-14
Payer: COMMERCIAL

## 2018-09-14 VITALS
BODY MASS INDEX: 25.08 KG/M2 | HEART RATE: 75 BPM | DIASTOLIC BLOOD PRESSURE: 71 MMHG | SYSTOLIC BLOOD PRESSURE: 106 MMHG | WEIGHT: 169.8 LBS

## 2018-09-14 DIAGNOSIS — E03.8 SUBCLINICAL HYPOTHYROIDISM: ICD-10-CM

## 2018-09-14 DIAGNOSIS — O09.819 SUPERVISION OF PREGNANCY RESULTING FROM ASSISTED REPRODUCTIVE TECHNOLOGY: Primary | ICD-10-CM

## 2018-09-14 PROCEDURE — 99207 ZZC FIRST OB VISIT: CPT | Performed by: OBSTETRICS & GYNECOLOGY

## 2018-09-14 NOTE — MR AVS SNAPSHOT
After Visit Summary   9/14/2018    Lolis Johnson    MRN: [id number]           Patient Information     Date Of Birth          1985        Visit Information        Provider Department      9/14/2018 2:00 PM Christiane Lu MD [facility]        Today's Diagnoses     Subclinical hypothyroidism    -  1    Supervision of pregnancy resulting from assisted reproductive technology           Follow-ups after your visit        Your next 10 appointments already scheduled     Sep 28, 2018  3:45 PM CDT   ESTABLISHED PRENATAL with Christiane Lu MD   [facility] ([facility]    [address] [place] [id number]   [phone]            Oct 12, 2018 10:00 AM CDT   ESTABLISHED PRENATAL with Christiane Lu MD   [facility] ([facility]    [address] [place] [id number] [phone]            Oct 23, 2018  4:00 PM CDT   ESTABLISHED PRENATAL with Christiane Lu MD   [facility] ([facility]    [address] [place] [id number] [phone]              Who to contact     If you have questions or need follow up information about today's clinic visit or your schedule please contact [facility] directly at [phone].  Normal or non-critical lab and imaging results will be communicated to you by MyChart, letter or phone within 4 business days after the clinic has received the results. If you do not hear from us within 7 days, please contact the clinic through MyChart or phone. If you have a critical or abnormal lab result, we will notify you by phone as soon as possible.  Submit refill requests through [employer] or call your pharmacy and they will forward the refill request to us. Please allow 3 business days for your refill to be completed.          Additional Information  About Your Visit        [employer] Information     [employer] gives you secure access to your electronic health record. If you see a primary care provider, you can also send messages to your care team and make appointments. If you have questions, please call your primary care clinic.  If you do not have a primary care provider, please call [phone] and they will assist you.        Care EveryWhere ID     This is your Care EveryWhere ID. This could be used by other organizations to access your [place] medical records  [id number]        Your Vitals Were     Pulse BMI (Body Mass Index)                75 25.08 kg/m2           Blood Pressure from Last 3 Encounters:   09/14/18 106/71   08/28/18 107/75   04/13/18 111/68    Weight from Last 3 Encounters:   09/14/18 169 lb 12.8 oz (77 kg)   08/28/18 165 lb (74.8 kg)   04/13/18 163 lb 9.6 oz (74.2 kg)              Today, you had the following     No orders found for display       Primary Care Provider Office Phone # Fax #    Christiane Lu [phone]       [address] [place] [address]  [facility] [address]        Equal Access to Services     [facility] : Hadii aad ku juano Soten, waaxda luqadaha, qaybta kaalmada adeegyada, yanelis godinez. So [facility] [phone].    ATENCIÓN: Si habla español, tiene a powers disposición servicios gratuitos de asistencia lingüística. [facility] [phone].    We comply with applicable federal civil rights laws and [place] laws. We do not discriminate on the basis of race, color, national origin, age, disability, sex, sexual orientation, or gender identity.            Thank you!     Thank you for choosing [facility]  for your care. Our goal is always to provide you with excellent care. Hearing back from our patients is one way we can continue to improve our services. Please take a few minutes to complete the written survey that you may receive in the mail after your visit with  us. Thank you!             Your Updated Medication List - Protect others around you: Learn how to safely use, store and throw away your medicines at www.disposemymeds.org.          This list is accurate as of 9/14/18 11:59 PM.  Always use your most recent med list.                   Brand Name Dispense Instructions for use Diagnosis    cetirizine 10 MG tablet    zyrTEC     Take 10 mg by mouth        levothyroxine 75 MCG tablet    SYNTHROID/LEVOTHROID    30 tablet    Take 1 tablet (75 mcg) by mouth daily    Other specified hypothyroidism       prenatal multivitamin plus iron 27-0.8 MG Tabs per tablet      Take 1 tablet by mouth daily        UNABLE TO FIND      MEDICATION NAME:Endometrium supository 3 times a day . 100 mg

## 2018-09-14 NOTE — NURSING NOTE
"Chief Complaint   Patient presents with     Prenatal Care     10+5       Initial /71  Pulse 75  Wt 169 lb 12.8 oz (77 kg)  BMI 25.08 kg/m2 Estimated body mass index is 25.08 kg/(m^2) as calculated from the following:    Height as of 18: 5' 9\" (1.753 m).    Weight as of this encounter: 169 lb 12.8 oz (77 kg).  BP completed using cuff size: regular        The following HM Due: NONE      The following patient reported/Care Every where data was sent to:  P ABSTRACT QUALITY INITIATIVES [03814]        patient has appointment for today   Harriet Diaz               "

## 2018-09-17 PROBLEM — O35.10X0 CHROMOSOMAL ABNORMALITY IN FETUS AFFECTING CARE OF MOTHER: Status: RESOLVED | Noted: 2018-08-28 | Resolved: 2018-09-17

## 2018-09-17 PROBLEM — Z36.0: Status: RESOLVED | Noted: 2018-08-28 | Resolved: 2018-09-17

## 2018-09-17 PROBLEM — O42.112 PRETERM PREMATURE RUPTURE OF MEMBRANES (PPROM) WITH ONSET OF LABOR AFTER 24 HOURS OF RUPTURE IN SECOND TRIMESTER, ANTEPARTUM: Status: RESOLVED | Noted: 2017-06-21 | Resolved: 2018-09-17

## 2018-09-17 PROBLEM — O31.30X0: Status: RESOLVED | Noted: 2018-08-28 | Resolved: 2018-09-17

## 2018-09-17 PROBLEM — O30.109 TRIPLET GESTATION: Status: RESOLVED | Noted: 2017-05-16 | Resolved: 2018-09-17

## 2018-09-17 PROBLEM — O09.819 SUPERVISION OF PREGNANCY RESULTING FROM ASSISTED REPRODUCTIVE TECHNOLOGY: Status: ACTIVE | Noted: 2018-09-17

## 2018-09-17 PROBLEM — O42.90 PROM (PREMATURE RUPTURE OF MEMBRANES): Status: RESOLVED | Noted: 2017-06-18 | Resolved: 2018-09-17

## 2018-09-17 PROBLEM — O36.4XX0 FETAL DEMISE BEFORE 22 WEEKS WITH RETENTION OF DEAD FETUS: Status: RESOLVED | Noted: 2017-06-19 | Resolved: 2018-09-17

## 2018-09-17 PROBLEM — O30.90: Status: RESOLVED | Noted: 2018-08-28 | Resolved: 2018-09-17

## 2018-09-17 NOTE — PROGRESS NOTES
SUBJECTIVE: Lolis Johnson is a 33 year old   here for initial OB visit.  Doing well.   Mild nausea, occ vomiting.   Very happy to be pregnant, but cautious. 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  She did IVF and PGD with this pregnancy. They had one euploid embryo without the translocation, one affected embryo, and one embryo with trisomy. They transferred the one unaffected embryo.  They continue to also pursue adoption for family building.   Her synthroid is at 75 mcg.     She is not currently in therapy, their pregnancy loss therapist was very helpful but [place]. She is aware therapy will probably be helpful to her throughout this pregnancy as well but has been hesitant to embrace this pregnancy this early.  She is nervous to return to labor and delivery, but she does want to deliver at [facility].     Past Medical History:   Diagnosis Date     Subclinical hypothyroidism 2016       Past Surgical History:   Procedure Laterality Date     SINUS SURGERY         Family History   Problem Relation Age of Onset     Breast Cancer Paternal Grandmother        Social History     Social History     Marital status:      Spouse name: N/A     Number of children: N/A     Years of education: N/A     Occupational History     Not on file.     Social History Main Topics     Smoking status: Never Smoker     Smokeless tobacco: Never Used     Alcohol use No      Comment: occ     Drug use: No     Sexual activity: Yes     Partners: Male     Birth control/ protection:      Other Topics Concern     Parent/Sibling W/ Cabg, Mi Or Angioplasty Before 65f 55m? No     Social History Narrative         Current Outpatient Prescriptions:      UNABLE TO FIND, MEDICATION NAME:Endometrium supository 3 times a day . 100 mg, Disp: , Rfl:      cetirizine (ZYRTEC) 10 MG tablet, Take 10 mg by mouth, Disp: , Rfl:      levothyroxine (SYNTHROID/LEVOTHROID) 75 MCG  tablet, Take 1 tablet (75 mcg) by mouth daily, Disp: 30 tablet, Rfl: 1     Prenatal Vit-Fe Fumarate-FA (PRENATAL MULTIVITAMIN PLUS IRON) 27-0.8 MG TABS per tablet, Take 1 tablet by mouth daily, Disp: , Rfl:     Allergies   Allergen Reactions     Sulfa Drugs          Past Medical History of Father of Baby: No significant medical history    Review of Systems:   Constitutional, HEENT, cardiovascular, pulmonary, gi and gu systems are negative, except as otherwise noted.     History Since Last Menstrual Period: No Problems    EXAM:   Vitals:    09/14/18 1419   BP: 106/71   Pulse: 75   Weight: 169 lb 12.8 oz (77 kg)   Body mass index is 25.08 kg/(m^2).     GENERAL APPEARANCE: healthy, alert and no distress  EYES: EOMI,  PERRL  HENT: Nose and mouth without ulcers or lesions  NECK: no adenopathy, no asymmetry, masses, or scars and thyroid normal to palpation  RESP: lungs clear to auscultation - no rales, rhonchi or wheezes  BREAST: normal without masses, tenderness or nipple discharge and no palpable axillary masses or adenopathy  CV: regular rates and rhythm, normal S1 S2, no S3 or S4 and no murmur, click or rub -  ABDOMEN:  soft, nontender, no HSM or masses and bowel sounds normal  : normal cervix, adnexae, and uterus without masses or discharge  MS: extremities normal- no gross deformities noted, no evidence of inflammation in joints, FROM in all extremities.  SKIN: no suspicious lesions or rashes  NEURO: Normal strength and tone, sensory exam grossly normal, mentation intact and speech normal  PSYCH: mentation appears normal and affect normal/bright  LYMPHATICS: No axillary, cervical, inguinal, or supraclavicular nodes    Pelvix exam:  Perineum: Intact;   Vulva: Normal;  Vagina: Normal mucosa, no discharge,   Cervix: Nulliparous, closed, mobile,  no discharge;  Uterus: 10 weeks, Normal shape, position and consistency, Anteflexed and Nontender;   Adnexa: Normal;  Anus: Normal without lesion or mass;   Bony Pelvis:  Adequate.     Ultrasound: Informal for heart tones. Viable mo IUP c/w dates.       ASSESSMENT/ PLAN:  Lolis Johnson is a 33 year old   at 10 weeks 2 days by IVF dating. EDC 4/10/19 established by IVF clinic.   Follow up in 4 weeks.  Normal exercise.  Normal sexual activity.  Prenatal vitamins.  Anticipated weight gain:  BMI <25: 25-35 pounds  Check TSH next visit, q trimester once stabilized.   TDaP 27-36 weeks  Oriented to practice, PNC  Discussed aneuploidy screening, declines. Known normal genetics from PGD.  Plan comprehensive ultrasound at 18-20 weeks with MFM and fetal echo for IVF pregnancy.   Plan private tour of L&D in third trimester or when indicated.   Plan to re-establish with therapist.   Flu shot next visit.

## 2018-09-28 ENCOUNTER — PRENATAL OFFICE VISIT (OUTPATIENT)
Dept: OBGYN | Facility: CLINIC | Age: 33
End: 2018-09-28
Payer: COMMERCIAL

## 2018-09-28 VITALS
WEIGHT: 174 LBS | DIASTOLIC BLOOD PRESSURE: 76 MMHG | HEART RATE: 88 BPM | TEMPERATURE: 97.9 F | BODY MASS INDEX: 25.7 KG/M2 | SYSTOLIC BLOOD PRESSURE: 113 MMHG

## 2018-09-28 DIAGNOSIS — O09.819 SUPERVISION OF PREGNANCY RESULTING FROM ASSISTED REPRODUCTIVE TECHNOLOGY: Primary | ICD-10-CM

## 2018-09-28 DIAGNOSIS — E03.8 SUBCLINICAL HYPOTHYROIDISM: ICD-10-CM

## 2018-09-28 PROCEDURE — 36415 COLL VENOUS BLD VENIPUNCTURE: CPT | Performed by: OBSTETRICS & GYNECOLOGY

## 2018-09-28 PROCEDURE — 84443 ASSAY THYROID STIM HORMONE: CPT | Performed by: OBSTETRICS & GYNECOLOGY

## 2018-09-28 PROCEDURE — 99207 ZZC PRENATAL VISIT: CPT | Performed by: OBSTETRICS & GYNECOLOGY

## 2018-09-28 NOTE — PROGRESS NOTES

## 2018-09-28 NOTE — MR AVS SNAPSHOT
After Visit Summary   9/28/2018    Lolis Johnson    MRN: [id number]           Patient Information     Date Of Birth          1985        Visit Information        Provider Department      9/28/2018 3:45 PM Christiane Lu MD [facility]        Today's Diagnoses     Supervision of pregnancy resulting from assisted reproductive technology    -  1    Subclinical hypothyroidism           Follow-ups after your visit        Your next 10 appointments already scheduled     Oct 12, 2018 10:00 AM CDT   ESTABLISHED PRENATAL with Christiane Lu MD   [facility] ([facility])    [address] [place] [id number] [phone]            Oct 23, 2018  4:00 PM CDT   ESTABLISHED PRENATAL with Christiane Lu MD   [facility] ([facility])    [address] [place] [id number] [phone]              Who to contact     If you have questions or need follow up information about today's clinic visit or your schedule please contact [facility] directly at [phone].  Normal or non-critical lab and imaging results will be communicated to you by MyChart, letter or phone within 4 business days after the clinic has received the results. If you do not hear from us within 7 days, please contact the clinic through [employer] or phone. If you have a critical or abnormal lab result, we will notify you by phone as soon as possible.  Submit refill requests through [employer] or call your pharmacy and they will forward the refill request to us. Please allow 3 business days for your refill to be completed.          Additional Information About Your Visit        MyChart Information     [employer] gives you secure access to your electronic health record. If you see a primary care provider, you can also send messages to your care team and make appointments. If you have  questions, please call your primary care clinic.  If you do not have a primary care provider, please call [phone] and they will assist you.        Care EveryWhere ID     This is your Care EveryWhere ID. This could be used by other organizations to access your [place] medical records  [id number]        Your Vitals Were     Pulse Temperature BMI (Body Mass Index)             88 97.9  F (36.6  C) (Oral) 25.7 kg/m2          Blood Pressure from Last 3 Encounters:   09/28/18 113/76   09/14/18 106/71   08/28/18 107/75    Weight from Last 3 Encounters:   09/28/18 174 lb (78.9 kg)   09/14/18 169 lb 12.8 oz (77 kg)   08/28/18 165 lb (74.8 kg)              We Performed the Following     **TSH with free T4 reflex FUTURE anytime     FLU VACCINE, SPLIT VIRUS, IM (QUADRIVALENT) [22462]- >3 YRS        Primary Care Provider Office Phone # Fax #    Christiane Lu [phone]       [address] [facility] [address]  [facility] [address]        Equal Access to Services     [facility]: Hadii aad ku hadasho Soomaali, waaxda luqadaha, qaybta kaalmada adequiqueyada, yanelis britt . So [facility] [phone].    ATENCIÓN: Si habla español, tiene a powers disposición servicios gratuitos de asistencia lingüística. Llame al [phone].    We comply with applicable federal civil rights laws and [place] laws. We do not discriminate on the basis of race, color, national origin, age, disability, sex, sexual orientation, or gender identity.            Thank you!     Thank you for choosing [facility]  for your care. Our goal is always to provide you with excellent care. Hearing back from our patients is one way we can continue to improve our services. Please take a few minutes to complete the written survey that you may receive in the mail after your visit with us. Thank you!             Your Updated Medication List - Protect others around you: Learn how to safely use, store and throw away  your medicines at www.disposemymeds.org.          This list is accurate as of 9/28/18 11:59 PM.  Always use your most recent med list.                   Brand Name Dispense Instructions for use Diagnosis    cetirizine 10 MG tablet    zyrTEC     Take 10 mg by mouth        levothyroxine 75 MCG tablet    SYNTHROID/LEVOTHROID    30 tablet    Take 1 tablet (75 mcg) by mouth daily    Other specified hypothyroidism       prenatal multivitamin plus iron 27-0.8 MG Tabs per tablet      Take 1 tablet by mouth daily        UNABLE TO FIND      MEDICATION NAME:Endometrium supository 3 times a day . 100 mg

## 2018-09-28 NOTE — Clinical Note
This patient didn't get a flu shot and it was erroneously entered.  Do you know how to cancel these charges?

## 2018-09-29 LAB — TSH SERPL DL<=0.005 MIU/L-ACNC: 2.32 MU/L (ref 0.4–4)

## 2018-09-29 NOTE — PROGRESS NOTES
Doing well.   Nausea still there, tolerating it.   Flut shot next week, gets work perks.   Order Fetal survey next visit.  TSH today.   RTC q 2 weeks.

## 2018-10-12 ENCOUNTER — PRENATAL OFFICE VISIT (OUTPATIENT)
Dept: OBGYN | Facility: CLINIC | Age: 33
End: 2018-10-12
Payer: COMMERCIAL

## 2018-10-12 VITALS
HEART RATE: 97 BPM | TEMPERATURE: 97.8 F | SYSTOLIC BLOOD PRESSURE: 110 MMHG | WEIGHT: 175.9 LBS | DIASTOLIC BLOOD PRESSURE: 72 MMHG | BODY MASS INDEX: 25.98 KG/M2

## 2018-10-12 DIAGNOSIS — O09.819 SUPERVISION OF PREGNANCY RESULTING FROM ASSISTED REPRODUCTIVE TECHNOLOGY: Primary | ICD-10-CM

## 2018-10-12 PROCEDURE — 99207 ZZC PRENATAL VISIT: CPT | Performed by: OBSTETRICS & GYNECOLOGY

## 2018-10-12 NOTE — MR AVS SNAPSHOT
After Visit Summary   10/12/2018    Lolis Johnson    MRN: [id number]           Patient Information     Date Of Birth          1985        Visit Information        Provider Department      10/12/2018 10:00 Christiane Steele MD [facility]        Today's Diagnoses     Supervision of pregnancy resulting from assisted reproductive technology    -  1       Follow-ups after your visit        Additional Services     [facility] FETAL MED CTR REFERRAL-PREGNANCY       Body mass index is 25.98 kg/(m^2).    >> Patient may proceed with recommendations for further testing as directed by the Maternal Fetal Medicine Specialist >>    >> If requesting Fetal Echo: MFM will determine appropriate location for exam due to indication.    >> If requesting Lung Maturity Amnio:  If results indicate fetal lung maturity, induction or C/S is recommended within 36 hours.  Please schedule accordingly.     Dear Patient:   Please be aware that coverage of these services is subject to the terms and limitations of your health insurance plan.  Call member services at your health plan with any benefit or coverage questions.      Please bring the following to your appointment:    >>  Any x-rays, CTs or MRIs which have been performed.  Contact the facility where they were done to arrange for  prior to your scheduled appointment.  Any new CT, MRI or other procedures ordered by your specialist must be performed at a [place] facility or coordinated by your clinic's referral office.  >>  List of current medications   >>  This referral request   >>  Any documents/labs given to you for this referral                  Your next 10 appointments already scheduled     Oct 23, 2018  4:00 PM CDT   ESTABLISHED PRENATAL with Christiane Lu MD   [facility] ([facility])    [address] [place] [id number] [phone]              Who to contact     If you  have questions or need follow up information about today's clinic visit or your schedule please contact [facility] directly at [phone].  Normal or non-critical lab and imaging results will be communicated to you by MyChart, letter or phone within 4 business days after the clinic has received the results. If you do not hear from us within 7 days, please contact the clinic through [employer] or phone. If you have a critical or abnormal lab result, we will notify you by phone as soon as possible.  Submit refill requests through [employer] or call your pharmacy and they will forward the refill request to us. Please allow 3 business days for your refill to be completed.          Additional Information About Your Visit        [employer] Information     [employer] gives you secure access to your electronic health record. If you see a primary care provider, you can also send messages to your care team and make appointments. If you have questions, please call your primary care clinic.  If you do not have a primary care provider, please call [phone] and they will assist you.        Care EveryWhere ID     This is your Care EveryWhere ID. This could be used by other organizations to access your [place] medical records  [id number]        Your Vitals Were     Pulse Temperature BMI (Body Mass Index)             97 97.8  F (36.6  C) (Oral) 25.98 kg/m2          Blood Pressure from Last 3 Encounters:   10/12/18 110/72   09/28/18 113/76   09/14/18 106/71    Weight from Last 3 Encounters:   10/12/18 175 lb 14.4 oz (79.8 kg)   09/28/18 174 lb (78.9 kg)   09/14/18 169 lb 12.8 oz (77 kg)              We Performed the Following     MAT FETAL MED CTR REFERRAL-PREGNANCY        Primary Care Provider Fax #    Physician No Ref-Primary [phone]       No address on file        Equal Access to Services     LO PHAN : Hal Barrientos, chencho bauer, yanelis ruth  lapedro godinez. So [facility] [phone].    ATENCIÓN: Si habla alyssa, tiene a powers disposición servicios gratuitos de asistencia lingüística. Uriel al [phone].    We comply with applicable federal civil rights laws and [place] laws. We do not discriminate on the basis of race, color, national origin, age, disability, sex, sexual orientation, or gender identity.            Thank you!     Thank you for choosing [facility]  for your care. Our goal is always to provide you with excellent care. Hearing back from our patients is one way we can continue to improve our services. Please take a few minutes to complete the written survey that you may receive in the mail after your visit with us. Thank you!             Your Updated Medication List - Protect others around you: Learn how to safely use, store and throw away your medicines at www.disposemymeds.org.          This list is accurate as of 10/12/18  9:00 PM.  Always use your most recent med list.                   Brand Name Dispense Instructions for use Diagnosis    cetirizine 10 MG tablet    zyrTEC     Take 10 mg by mouth        levothyroxine 75 MCG tablet    SYNTHROID/LEVOTHROID    30 tablet    Take 1 tablet (75 mcg) by mouth daily    Other specified hypothyroidism       prenatal multivitamin plus iron 27-0.8 MG Tabs per tablet      Take 1 tablet by mouth daily        UNABLE TO FIND      MEDICATION NAME:Endometrium supository 3 times a day . 100 mg

## 2018-10-23 ENCOUNTER — PRENATAL OFFICE VISIT (OUTPATIENT)
Dept: OBGYN | Facility: CLINIC | Age: 33
End: 2018-10-23
Payer: COMMERCIAL

## 2018-10-23 VITALS
TEMPERATURE: 98.5 F | BODY MASS INDEX: 26.43 KG/M2 | HEART RATE: 89 BPM | WEIGHT: 179 LBS | SYSTOLIC BLOOD PRESSURE: 107 MMHG | DIASTOLIC BLOOD PRESSURE: 69 MMHG

## 2018-10-23 DIAGNOSIS — O09.819 SUPERVISION OF PREGNANCY RESULTING FROM ASSISTED REPRODUCTIVE TECHNOLOGY: Primary | ICD-10-CM

## 2018-10-23 DIAGNOSIS — E03.8 OTHER SPECIFIED HYPOTHYROIDISM: ICD-10-CM

## 2018-10-23 DIAGNOSIS — E03.8 SUBCLINICAL HYPOTHYROIDISM: ICD-10-CM

## 2018-10-23 PROCEDURE — 99207 ZZC PRENATAL VISIT: CPT | Performed by: OBSTETRICS & GYNECOLOGY

## 2018-10-23 RX ORDER — LEVOTHYROXINE SODIUM 75 UG/1
75 TABLET ORAL DAILY
Qty: 30 TABLET | Refills: 1 | Status: SHIPPED | OUTPATIENT
Start: 2018-10-23 | End: 2018-11-20

## 2018-10-23 NOTE — PROGRESS NOTES
Doing well.   Nausea a little better.   Sour taste in mouth. Maybe will try zantac.   Fetal survey scheduled in 2.5 weeks.   RTC 4 weeks

## 2018-10-23 NOTE — MR AVS SNAPSHOT
After Visit Summary   10/23/2018    Lolis Johnson    MRN: [id number]           Patient Information     Date Of Birth          1985        Visit Information        Provider Department      10/23/2018 4:00 PM Christiane Lu MD [facility]        Today's Diagnoses     Supervision of pregnancy resulting from assisted reproductive technology    -  1       Follow-ups after your visit        Your next 10 appointments already scheduled     Nov 09, 2018  1:30 PM CST   MFM US COMP with [id number]   [facility])    [address]  [facility] [address] [phone]           Wear comfortable clothes and leave your valuables at home.            Nov 09, 2018  2:00 PM CST   Radiology MD with UR JAYLA KONG   [facility])    [address]  [facility] [address] [phone]           Please arrive at the time given for your first appointment. This visit is used internally to schedule the physician's time during your ultrasound.            Nov 20, 2018 12:45 PM CST   ESTABLISHED PRENATAL with Christiane Lu MD   [facility] ([facility])    [address] [place] [id number] [phone]              Who to contact     If you have questions or need follow up information about today's clinic visit or your schedule please contact [facility] directly at [phone].  Normal or non-critical lab and imaging results will be communicated to you by MyChart, letter or phone within 4 business days after the clinic has received the results. If you do not hear from us within 7 days, please contact the clinic through MyChart or phone. If you have a critical or abnormal lab result, we will notify you by phone as soon as possible.  Submit  refill requests through [employer] or call your pharmacy and they will forward the refill request to us. Please allow 3 business days for your refill to be completed.          Additional Information About Your Visit        [employer] Information     [employer] gives you secure access to your electronic health record. If you see a primary care provider, you can also send messages to your care team and make appointments. If you have questions, please call your primary care clinic.  If you do not have a primary care provider, please call [phone] and they will assist you.        Care EveryWhere ID     This is your Care EveryWhere ID. This could be used by other organizations to access your [place] medical records  [id number]        Your Vitals Were     Pulse Temperature BMI (Body Mass Index)             89 98.5  F (36.9  C) (Oral) 26.43 kg/m2          Blood Pressure from Last 3 Encounters:   10/23/18 107/69   10/12/18 110/72   09/28/18 113/76    Weight from Last 3 Encounters:   10/23/18 179 lb (81.2 kg)   10/12/18 175 lb 14.4 oz (79.8 kg)   09/28/18 174 lb (78.9 kg)              Today, you had the following     No orders found for display       Primary Care Provider Fax #    Physician No Ref-Primary [phone]       No address on file        Equal Access to Services     LO PHAN : Hadii aad ku hadasho Soomaali, waaxda luqadaha, qaybta kaalmada adeegyada, yanelis britt . So [facility] [place] [facility] [phone].    ATENCIÓN: Si habla español, tiene a powers disposición servicios gratuitos de asistencia lingüística. Llame al [phone].    We comply with applicable federal civil rights laws and [place] laws. We do not discriminate on the basis of race, color, national origin, age, disability, sex, sexual orientation, or gender identity.            Thank you!     Thank you for choosing [facility]  for your care. Our goal is always to provide you with excellent care. Hearing back from our  patients is one way we can continue to improve our services. Please take a few minutes to complete the written survey that you may receive in the mail after your visit with us. Thank you!             Your Updated Medication List - Protect others around you: Learn how to safely use, store and throw away your medicines at www.disposemymeds.org.          This list is accurate as of 10/23/18  4:48 PM.  Always use your most recent med list.                   Brand Name Dispense Instructions for use Diagnosis    cetirizine 10 MG tablet    zyrTEC     Take 10 mg by mouth        levothyroxine 75 MCG tablet    SYNTHROID/LEVOTHROID    30 tablet    Take 1 tablet (75 mcg) by mouth daily    Other specified hypothyroidism       prenatal multivitamin plus iron 27-0.8 MG Tabs per tablet      Take 1 tablet by mouth daily        UNABLE TO FIND      MEDICATION NAME:Endometrium supository 3 times a day . 100 mg

## 2018-11-07 ENCOUNTER — PRE VISIT (OUTPATIENT)
Dept: MATERNAL FETAL MEDICINE | Facility: CLINIC | Age: 33
End: 2018-11-07

## 2018-11-09 ENCOUNTER — HOSPITAL ENCOUNTER (OUTPATIENT)
Dept: ULTRASOUND IMAGING | Facility: CLINIC | Age: 33
Discharge: HOME | End: 2018-11-09
Attending: OBSTETRICS & GYNECOLOGY | Admitting: OBSTETRICS & GYNECOLOGY
Payer: COMMERCIAL

## 2018-11-09 ENCOUNTER — OFFICE VISIT (OUTPATIENT)
Dept: MATERNAL FETAL MEDICINE | Facility: CLINIC | Age: 33
End: 2018-11-09
Attending: OBSTETRICS & GYNECOLOGY
Payer: COMMERCIAL

## 2018-11-09 DIAGNOSIS — O26.90 PREGNANCY RELATED CONDITION, ANTEPARTUM: ICD-10-CM

## 2018-11-09 DIAGNOSIS — O09.819 PREGNANCY RESULTING FROM IN VITRO FERTILIZATION, ANTEPARTUM: Primary | ICD-10-CM

## 2018-11-09 PROCEDURE — 76811 OB US DETAILED SNGL FETUS: CPT

## 2018-11-09 NOTE — PROGRESS NOTES
Please see the imaging tab for details of the ultrasound performed today.    Alaina Salazar MD  Specialist in Maternal-Fetal Medicine

## 2018-11-20 ENCOUNTER — PRENATAL OFFICE VISIT (OUTPATIENT)
Dept: OBGYN | Facility: CLINIC | Age: 33
End: 2018-11-20
Payer: COMMERCIAL

## 2018-11-20 VITALS
DIASTOLIC BLOOD PRESSURE: 68 MMHG | WEIGHT: 185 LBS | SYSTOLIC BLOOD PRESSURE: 110 MMHG | HEART RATE: 112 BPM | BODY MASS INDEX: 27.32 KG/M2

## 2018-11-20 DIAGNOSIS — E03.8 OTHER SPECIFIED HYPOTHYROIDISM: ICD-10-CM

## 2018-11-20 DIAGNOSIS — E03.8 SUBCLINICAL HYPOTHYROIDISM: ICD-10-CM

## 2018-11-20 DIAGNOSIS — O09.819 SUPERVISION OF PREGNANCY RESULTING FROM ASSISTED REPRODUCTIVE TECHNOLOGY: Primary | ICD-10-CM

## 2018-11-20 PROCEDURE — 99207 ZZC PRENATAL VISIT: CPT | Performed by: OBSTETRICS & GYNECOLOGY

## 2018-11-20 RX ORDER — LEVOTHYROXINE SODIUM 75 UG/1
75 TABLET ORAL DAILY
Qty: 30 TABLET | Refills: 1 | Status: SHIPPED | OUTPATIENT
Start: 2018-11-20 | End: 2019-02-20

## 2018-11-20 NOTE — MR AVS SNAPSHOT
After Visit Summary   11/20/2018    Lolis Johnson    MRN: [id number]           Patient Information     Date Of Birth          1985        Visit Information        Provider Department      11/20/2018 12:45 PM Christiane Lu MD [facility]        Today's Diagnoses     Supervision of pregnancy resulting from assisted reproductive technology    -  1    Other specified hypothyroidism        Subclinical hypothyroidism           Follow-ups after your visit        Your next 10 appointments already scheduled     Dec 04, 2018 10:15 AM CST   JAYLA READ SCREEN FETAL EHCO Rene with [id number]   [facility] Maternal Fetal Medicine Ultrasound - [facility])    [address]  [facility] [address] [phone]            Dec 04, 2018 10:45 AM CST   Radiology MD with UR JAYLA KONG   [facility] Maternal Fetal Medicine - [facility])    [address]  [facility] [address]   [phone]           Please arrive at the time given for your first appointment. This visit is used internally to schedule the physician's time during your ultrasound.            Dec 28, 2018 10:00 AM CST   LAB with RD LAB   [facility] [place]    [address] [place] [address] [address]  [facility] [address] [phone]           Please do not eat 10-12 hours before your appointment if you are coming in fasting for labs on lipids, cholesterol, or glucose (sugar). This does not apply to pregnant women. Water, hot tea and black coffee (with nothing added) are okay. Do not drink other fluids, diet soda or chew gum.            Dec 28, 2018 10:15 AM CST   ESTABLISHED PRENATAL with Melisa Acosta MD   [facility] [place]    [address] [facility]  [address]  [facility] [address] [phone]            Jan 21, 2019  9:30 AM CST   JAYLA US  MICHELLEE SINGLE F/U with [id number]   [facility]etal [facility]trasound - [place] ([facility])    [address]  [facility] [id number] [phone]           Wear comfortable clothes and leave your valuables at home.            Jan 21, 2019 10:00 AM CST   Radiology MD with UR JAYLA KONG   [facility] - [place] ([facility])    [address]  [facility] [address]   [phone]           Please arrive at the time given for your first appointment. This visit is used internally to schedule the physician's time during your ultrasound.            Jan 25, 2019  3:45 PM CST   ESTABLISHED PRENATAL with Melisa Acosta MD   [facility] ([facility])    [address] [place] [address] [phone]              Future tests that were ordered for you today     Open Future Orders        Priority Expected Expires Ordered    Glucose tolerance, gest screen, 1 hour Routine  1/20/2019 11/20/2018    OB hemoglobin Routine  1/20/2019 11/20/2018    **TSH with free T4 reflex FUTURE anytime Routine 11/20/2018 1/20/2019 11/20/2018            Who to contact     If you have questions or need follow up information about today's clinic visit or your schedule please contact [facility] directly at [phone].  Normal or non-critical lab and imaging results will be communicated to you by MyChart, letter or phone within 4 business days after the clinic has received the results. If you do not hear from us within 7 days, please contact the clinic through MyChart or phone. If you have a critical or abnormal lab result, we will notify you by phone as soon as possible.  Submit refill requests through [employer] or call your pharmacy and they will forward the refill request to us. Please allow 3 business days for your refill to be completed.           Additional Information About Your Visit        MyChart Information     [employer] gives you secure access to your electronic health record. If you see a primary care provider, you can also send messages to your care team and make appointments. If you have questions, please call your primary care clinic.  If you do not have a primary care provider, please call [phone] and they will assist you.        Care EveryWhere ID     This is your Care EveryWhere ID. This could be used by other organizations to access your [place] medical records  [id number]        Your Vitals Were     Pulse BMI (Body Mass Index)                112 27.32 kg/m2           Blood Pressure from Last 3 Encounters:   11/20/18 110/68   10/23/18 107/69   10/12/18 110/72    Weight from Last 3 Encounters:   11/20/18 185 lb (83.9 kg)   10/23/18 179 lb (81.2 kg)   10/12/18 175 lb 14.4 oz (79.8 kg)                 Where to get your medicines      These medications were sent to [place] [id number] [place] [address]  [address] [facility] [address]     Phone:  [phone]     levothyroxine 75 MCG tablet          Primary Care Provider Fax #    Physician No Ref-Primary [phone]       No address on file        Equal Access to Services     LO PHAN : Hadii yuridia ku hadasho Soomaali, waaxda luqadaha, qaybta kaalmada adeegyada, waxay spencer godinez. So [facility] [phone].    ATENCIÓN: Si habla español, tiene a powers disposición servicios gratuitos de asistencia lingüística. Llame al [phone].    We comply with applicable federal civil rights laws and [place] laws. We do not discriminate on the basis of race, color, national origin, age, disability, sex, sexual orientation, or gender identity.            Thank you!     Thank you for choosing [facility]  for your care. Our goal is always to provide you with excellent care. Hearing back from our patients is one way we can continue to  improve our services. Please take a few minutes to complete the written survey that you may receive in the mail after your visit with us. Thank you!             Your Updated Medication List - Protect others around you: Learn how to safely use, store and throw away your medicines at www.disposemymeds.org.          This list is accurate as of 11/20/18  1:56 PM.  Always use your most recent med list.                   Brand Name Dispense Instructions for use Diagnosis    cetirizine 10 MG tablet    zyrTEC     Take 10 mg by mouth        levothyroxine 75 MCG tablet    SYNTHROID/LEVOTHROID    30 tablet    Take 1 tablet (75 mcg) by mouth daily    Other specified hypothyroidism       prenatal multivitamin plus iron 27-0.8 MG Tabs per tablet      Take 1 tablet by mouth daily        UNABLE TO FIND      MEDICATION NAME:Endometrium supository 3 times a day . 100 mg

## 2018-11-20 NOTE — PROGRESS NOTES
Doing well.   Feeling movement.   Fetal survey normal, but low lying placenta. Has fetal echo scheduled for IVF pregnancy. .  GCT next visit. TSH next visit.  RTC 4 weeks.

## 2018-12-04 ENCOUNTER — OFFICE VISIT (OUTPATIENT)
Dept: MATERNAL FETAL MEDICINE | Facility: CLINIC | Age: 33
End: 2018-12-04
Attending: OBSTETRICS & GYNECOLOGY
Payer: COMMERCIAL

## 2018-12-04 ENCOUNTER — HOSPITAL ENCOUNTER (OUTPATIENT)
Dept: ULTRASOUND IMAGING | Facility: CLINIC | Age: 33
Discharge: HOME | End: 2018-12-04
Attending: OBSTETRICS & GYNECOLOGY | Admitting: OBSTETRICS & GYNECOLOGY
Payer: COMMERCIAL

## 2018-12-04 DIAGNOSIS — O09.819 PREGNANCY RESULTING FROM IN VITRO FERTILIZATION, ANTEPARTUM: ICD-10-CM

## 2018-12-04 DIAGNOSIS — O09.819 PREGNANCY RESULTING FROM IN VITRO FERTILIZATION, ANTEPARTUM: Primary | ICD-10-CM

## 2018-12-04 PROCEDURE — 76827 ECHO EXAM OF FETAL HEART: CPT

## 2018-12-04 NOTE — PROGRESS NOTES
"Please see \"Imaging\" tab under \"Chart Review\" for details of today's fetal echo at the [facility].    Randal Gordon MD  Maternal-Fetal Medicine      "

## 2018-12-04 NOTE — MR AVS SNAPSHOT
After Visit Summary   12/4/2018    Lolis Johnson    MRN: [id number]           Patient Information     Date Of Birth          1985        Visit Information        Provider Department      12/4/2018 10:45 AM Randal Gordon MD [facility] Maternal Fetal Medicine [facility]        Today's Diagnoses     Pregnancy resulting from in vitro fertilization, antepartum    -  1       Follow-ups after your visit        Your next 10 appointments already scheduled     Dec 28, 2018 10:00 AM CST   LAB with RD LAB   [facility])    [address] [place] [address]  [facility] [address]   [phone]           Please do not eat 10-12 hours before your appointment if you are coming in fasting for labs on lipids, cholesterol, or glucose (sugar). This does not apply to pregnant women. Water, hot tea and black coffee (with nothing added) are okay. Do not drink other fluids, diet soda or chew gum.            Dec 28, 2018 10:15 AM CST   ESTABLISHED PRENATAL with Melisa Acosta MD   [facility] ([facility])    [address] [place] [address]  [facility] [address]   [phone]            Jan 21, 2019  9:30 AM CST   MFM US COMPRE SINGLE F/U with [id number]   [facility]etal [facility]tras[facility])    [address]  [facility] [address] [phone]           Wear comfortable clothes and leave your valuables at home.            Jan 21, 2019 10:00 AM CST   Radiology MD with UR JAYLA KONG   [facility] Maternal Fetal Medicine - [facility])    [address]  [facility] [address]   [phone]           Please arrive at the time given for your first appointment. This visit is used internally to schedule the physician's time during your ultrasound.            Jan 25, 2019  3:45 PM CST   ESTABLISHED PRENATAL  with Melisa Acosta MD   [facility] ([facility])    [address] [place] [address] [phone]              Who to contact     If you have questions or need follow up information about today's clinic visit or your schedule please contact [facility] MATERNAL FETAL MEDICINE [facility] directly at [phone].  Normal or non-critical lab and imaging results will be communicated to you by [employer], letter or phone within 4 business days after the clinic has received the results. If you do not hear from us within 7 days, please contact the clinic through [employer] or phone. If you have a critical or abnormal lab result, we will notify you by phone as soon as possible.  Submit refill requests through [employer] or call your pharmacy and they will forward the refill request to us. Please allow 3 business days for your refill to be completed.          Additional Information About Your Visit        [employer] Information     [employer] gives you secure access to your electronic health record. If you see a primary care provider, you can also send messages to your care team and make appointments. If you have questions, please call your primary care clinic.  If you do not have a primary care provider, please call [phone] and they will assist you.        Care EveryWhere ID     This is your Care EveryWhere ID. This could be used by other organizations to access your [place] medical records  [id number]         Blood Pressure from Last 3 Encounters:   11/20/18 110/68   10/23/18 107/69   10/12/18 110/72    Weight from Last 3 Encounters:   11/20/18 83.9 kg (185 lb)   10/23/18 81.2 kg (179 lb)   10/12/18 79.8 kg (175 lb 14.4 oz)              Today, you had the following     No orders found for display       Primary Care Provider Fax #    Physician No Ref-Primary [phone]       No address on file        Equal Access to Services     LO PHAN : Hal swann  randal Barrientos, chencho bauer, sherwin jeremyqasim toniarosalva, yanelis dilanin hayaacamille randallquique odnnieprincejose mcarthurJitendrawayne herbert. So [facility] [phone].    ATENCIÓN: Si habla español, tiene a powers disposición servicios gratuitos de asistencia lingüística. Uriel al [phone].    We comply with applicable federal civil rights laws and [place] laws. We do not discriminate on the basis of race, color, national origin, age, disability, sex, sexual orientation, or gender identity.            Thank you!     Thank you for choosing [facility]  for your care. Our goal is always to provide you with excellent care. Hearing back from our patients is one way we can continue to improve our services. Please take a few minutes to complete the written survey that you may receive in the mail after your visit with us. Thank you!             Your Updated Medication List - Protect others around you: Learn how to safely use, store and throw away your medicines at www.disposemymeds.org.          This list is accurate as of 12/4/18 10:51 AM.  Always use your most recent med list.                   Brand Name Dispense Instructions for use Diagnosis    cetirizine 10 MG tablet    zyrTEC     Take 10 mg by mouth        levothyroxine 75 MCG tablet    SYNTHROID/LEVOTHROID    30 tablet    Take 1 tablet (75 mcg) by mouth daily    Other specified hypothyroidism       prenatal multivitamin w/iron 27-0.8 MG tablet      Take 1 tablet by mouth daily        UNABLE TO FIND      MEDICATION NAME:Endometrium supository 3 times a day . 100 mg

## 2018-12-28 ENCOUNTER — PRENATAL OFFICE VISIT (OUTPATIENT)
Dept: OBGYN | Facility: CLINIC | Age: 33
End: 2018-12-28
Payer: COMMERCIAL

## 2018-12-28 VITALS
DIASTOLIC BLOOD PRESSURE: 65 MMHG | BODY MASS INDEX: 28.41 KG/M2 | HEIGHT: 69 IN | WEIGHT: 191.8 LBS | HEART RATE: 84 BPM | SYSTOLIC BLOOD PRESSURE: 107 MMHG | OXYGEN SATURATION: 100 % | TEMPERATURE: 99 F

## 2018-12-28 DIAGNOSIS — E03.8 SUBCLINICAL HYPOTHYROIDISM: ICD-10-CM

## 2018-12-28 DIAGNOSIS — O09.819 SUPERVISION OF PREGNANCY RESULTING FROM ASSISTED REPRODUCTIVE TECHNOLOGY: Primary | ICD-10-CM

## 2018-12-28 DIAGNOSIS — Z34.02 ENCOUNTER FOR SUPERVISION OF NORMAL FIRST PREGNANCY IN SECOND TRIMESTER: ICD-10-CM

## 2018-12-28 LAB
GLUCOSE 1H P 50 G GLC PO SERPL-MCNC: 67 MG/DL (ref 60–129)
HGB BLD-MCNC: 13.1 G/DL (ref 11.7–15.7)
TSH SERPL DL<=0.005 MIU/L-ACNC: 1.39 MU/L (ref 0.4–4)

## 2018-12-28 PROCEDURE — 00000218 ZZHCL STATISTIC OBHBG - HEMOGLOBIN: Performed by: OBSTETRICS & GYNECOLOGY

## 2018-12-28 PROCEDURE — 82950 GLUCOSE TEST: CPT | Performed by: OBSTETRICS & GYNECOLOGY

## 2018-12-28 PROCEDURE — 36415 COLL VENOUS BLD VENIPUNCTURE: CPT | Performed by: OBSTETRICS & GYNECOLOGY

## 2018-12-28 PROCEDURE — 99207 ZZC PRENATAL VISIT: CPT | Performed by: OBSTETRICS & GYNECOLOGY

## 2018-12-28 PROCEDURE — 84443 ASSAY THYROID STIM HORMONE: CPT | Performed by: OBSTETRICS & GYNECOLOGY

## 2018-12-28 ASSESSMENT — MIFFLIN-ST. JEOR: SCORE: 1649.38

## 2018-12-28 ASSESSMENT — PATIENT HEALTH QUESTIONNAIRE - PHQ9: SUM OF ALL RESPONSES TO PHQ QUESTIONS 1-9: 0

## 2018-12-28 NOTE — PROGRESS NOTES
25w2d  Active fetal movement. No pain, no concerns, no bleeding.  1h gtt 67, Hgb 13.1 did not order TSH, will do it next visit.  Childbirth classes? YES, planning on it  Plan on breastfeeding? Yes  Birthcontrol? undecided  Sex on ultrasound? Surprise!  Circumsion? not sure yet  Peds doc? Undecided, discussed  RTC 4 weeks.  Melisa Acosta MD

## 2018-12-31 ENCOUNTER — TELEPHONE (OUTPATIENT)
Dept: OBGYN | Facility: CLINIC | Age: 33
End: 2018-12-31

## 2018-12-31 ENCOUNTER — NURSE TRIAGE (OUTPATIENT)
Dept: NURSING | Facility: CLINIC | Age: 33
End: 2018-12-31

## 2018-12-31 DIAGNOSIS — O21.9 NAUSEA AND VOMITING IN PREGNANCY: Primary | ICD-10-CM

## 2018-12-31 RX ORDER — ONDANSETRON 4 MG/1
4-8 TABLET, ORALLY DISINTEGRATING ORAL EVERY 8 HOURS PRN
Qty: 30 TABLET | Refills: 1 | Status: ON HOLD | OUTPATIENT
Start: 2018-12-31 | End: 2019-04-04

## 2018-12-31 NOTE — TELEPHONE ENCOUNTER
"\"I am 25 weeks pregnant and went out to eat and started vomiting.\" Vomiting starting at 8 pm last evening. Patient reporting there was no one else that ate the same food as her. Stating she had normal bowel movement x 1 during the night. Vomiting x 2. Nausea. Dry heaves. Taking small sips of fluid. Last voiding at 1230 a.m.. Patient reporting sudden increase in fetal movement in past 2 hours.     Paged Dr Boyle through [facility] at 746 a.m. To call patient at Phone . Advised patient if no return call with in 20 minutes to call back.  Caller verbalized understanding. Denies further questions.    Melisa Helm RN  [facility]        Additional Information    Negative: Shock suspected (e.g., cold/pale/clammy skin, too weak to stand, low BP, rapid pulse)    Negative: Difficult to awaken or acting confused  (e.g., disoriented, slurred speech)    Negative: Sounds like a life-threatening emergency to the triager    Negative: Vomiting occurs only while coughing    Negative: [1] Pregnant < 20 Weeks AND [2] nausea/vomiting began in early pregnancy (i.e., 4-8 weeks pregnant)    Negative: Chest pain    Negative: [1] SEVERE headache AND [2] similar to prior migraines    Negative: [1] Vomiting AND [2] contains red blood or black (\"coffee ground\") material  (Exception: few red streaks in vomit that only happened once)    Negative: Severe pain in one eye    Negative: Recent head injury (within last 3 days)    Negative: Recent abdominal injury (within last 3 days)    Negative: [1] Insulin-dependent diabetes (Type I) AND [2] glucose > 400 mg/dl (22 mmol/l)    Negative: [1] SEVERE vomiting (e.g., 6 or more times/day) AND [2] present > 8 hours    Negative: [1] MODERATE vomiting (e.g., 3 - 5 times/day) AND [2] age > 60    Negative: Severe headache (e.g., excruciating) (Exception: similar to previous migraines)    Negative: High-risk adult (e.g., diabetes mellitus, brain tumor, V-P shunt, " hernia)    Negative: [1] Drinking very little AND [2] dehydration suspected (e.g., no urine > 12 hours, very dry mouth, very lightheaded)    Negative: Patient sounds very sick or weak to the triager    Negative: [1] MILD to MODERATE vomiting (e.g., 1-5 times/day) AND [2] abdomen looks much more swollen than usual    Negative: [1] Vomiting AND [2] contains bile (green color)    Negative: [1] Constant abdominal pain AND [2] present > 2 hours    Negative: [1] Fever > 103 F (39.4 C) AND [2] not able to get the fever down using Fever Care Advice    Negative: [1] Fever > 101 F (38.3 C) AND [2] age > 60    Negative: [1] Fever > 101 F (38.3 C) AND [2] bedridden (e.g., nursing home patient, CVA, chronic illness, recovering from surgery)    Negative: [1] Fever > 100.5 F (38.1 C) AND [2] weak immune system (e.g., HIV positive, cancer chemo, splenectomy, organ transplant, chronic steroids)    Negative: Taking any of the following medications: digoxin (Lanoxin), lithium, theophylline, phenytoin (Dilantin)    Negative: [1] MILD or MODERATE vomiting AND [2] present > 48 hours (2 days) (Exception: mild vomiting with associated diarrhea)    Negative: Fever present > 3 days (72 hours)    Negative: Vomiting a prescription medication    Negative: [1] MILD vomiting with diarrhea AND [2] present > 5 days    Negative: Alcohol or drug abuse, known or suspected    Negative: Vomiting is a chronic symptom (recurrent or ongoing AND present > 4 weeks)    Negative: [1] SEVERE vomiting (e.g., 6 or more times/day, vomits everything) BUT [2] hydrated (all triage questions negative)    MILD or MODERATE vomiting (e.g., 1 - 5 times / day) (all triage questions negative)    Protocols used: VOMITING-ADULT-

## 2018-12-31 NOTE — TELEPHONE ENCOUNTER
Patient called this morning reporting nausea since around 2000 last night and 2 episodes of emesis.  Decreased appetite, but no fevers, chills, abdominal pain/cramping, vaginal discharge or bleeding, urinary symptoms or loose stools.  Baby very active the last few hours.    Likely viral GI bug.  Recommended Zofran PO, which was sent to her prescribed pharmacy, bland foods/cracker and small sips of liquid as she's able.  If not feeling improvement by this afternoon or develops any symptoms mentioned above, asked that she call back.    Melisa Boyle MD

## 2019-01-21 ENCOUNTER — HOSPITAL ENCOUNTER (OUTPATIENT)
Dept: ULTRASOUND IMAGING | Facility: CLINIC | Age: 34
Discharge: HOME | End: 2019-01-21
Attending: OBSTETRICS & GYNECOLOGY | Admitting: OBSTETRICS & GYNECOLOGY
Payer: COMMERCIAL

## 2019-01-21 ENCOUNTER — OFFICE VISIT (OUTPATIENT)
Dept: MATERNAL FETAL MEDICINE | Facility: CLINIC | Age: 34
End: 2019-01-21
Attending: OBSTETRICS & GYNECOLOGY
Payer: COMMERCIAL

## 2019-01-21 DIAGNOSIS — O44.43 LOW LYING PLACENTA NOS OR WITHOUT HEMORRHAGE, THIRD TRIMESTER: Primary | ICD-10-CM

## 2019-01-21 DIAGNOSIS — O09.819 PREGNANCY RESULTING FROM IN VITRO FERTILIZATION, ANTEPARTUM: ICD-10-CM

## 2019-01-21 PROCEDURE — 76816 OB US FOLLOW-UP PER FETUS: CPT

## 2019-01-21 NOTE — PROGRESS NOTES
"Please see \"Imaging\" tab under \"Chart Review\" for details of today's US.    Tracy Mann, DO    "

## 2019-01-24 ENCOUNTER — PRENATAL OFFICE VISIT (OUTPATIENT)
Dept: OBGYN | Facility: CLINIC | Age: 34
End: 2019-01-24
Payer: COMMERCIAL

## 2019-01-24 VITALS
DIASTOLIC BLOOD PRESSURE: 70 MMHG | OXYGEN SATURATION: 100 % | WEIGHT: 194 LBS | SYSTOLIC BLOOD PRESSURE: 108 MMHG | BODY MASS INDEX: 28.65 KG/M2 | HEART RATE: 97 BPM

## 2019-01-24 DIAGNOSIS — Z34.03 ENCOUNTER FOR SUPERVISION OF NORMAL FIRST PREGNANCY IN THIRD TRIMESTER: Primary | ICD-10-CM

## 2019-01-24 DIAGNOSIS — Z23 NEED FOR TDAP VACCINATION: ICD-10-CM

## 2019-01-24 PROCEDURE — 90715 TDAP VACCINE 7 YRS/> IM: CPT | Performed by: OBSTETRICS & GYNECOLOGY

## 2019-01-24 PROCEDURE — 90471 IMMUNIZATION ADMIN: CPT | Performed by: OBSTETRICS & GYNECOLOGY

## 2019-01-24 PROCEDURE — 99207 ZZC PRENATAL VISIT: CPT | Performed by: OBSTETRICS & GYNECOLOGY

## 2019-01-24 NOTE — PROGRESS NOTES
29w1d  Doing well, no concerns. Active fetal movement. No leaking or bleeding, no contractions.   US 1/21: Low lying placenta resolved, EFW 1318g (56%), normal fluid  TdaP today  RTC 2 weeks.  Melisa Acosta MD

## 2019-02-15 ENCOUNTER — PRENATAL OFFICE VISIT (OUTPATIENT)
Dept: OBGYN | Facility: CLINIC | Age: 34
End: 2019-02-15
Payer: COMMERCIAL

## 2019-02-15 VITALS
HEART RATE: 86 BPM | WEIGHT: 199 LBS | SYSTOLIC BLOOD PRESSURE: 109 MMHG | DIASTOLIC BLOOD PRESSURE: 68 MMHG | TEMPERATURE: 97.1 F | BODY MASS INDEX: 29.39 KG/M2

## 2019-02-15 DIAGNOSIS — Z34.03 ENCOUNTER FOR SUPERVISION OF NORMAL FIRST PREGNANCY IN THIRD TRIMESTER: Primary | ICD-10-CM

## 2019-02-15 PROCEDURE — 99207 ZZC PRENATAL VISIT: CPT | Performed by: OBSTETRICS & GYNECOLOGY

## 2019-02-16 NOTE — PROGRESS NOTES
32w2d   Active fetal movement. No concerns. Occasional [place]. No leaking or bleeding. RTC 2 weeks.  Melisa Acosta MD

## 2019-02-20 DIAGNOSIS — E03.8 OTHER SPECIFIED HYPOTHYROIDISM: ICD-10-CM

## 2019-02-20 RX ORDER — LEVOTHYROXINE SODIUM 75 UG/1
75 TABLET ORAL DAILY
Qty: 30 TABLET | Refills: 1 | Status: SHIPPED | OUTPATIENT
Start: 2019-02-20 | End: 2019-04-19

## 2019-02-20 NOTE — TELEPHONE ENCOUNTER
Signed Prescriptions:                        Disp   Refills    levothyroxine (SYNTHROID/LEVOTHROID) 75 MC*30 tab*1        Sig: Take 1 tablet (75 mcg) by mouth daily  Authorizing Provider: ESTUARDO WARREN  Ordering User: LENI GIRARD    Up to date on appts - last TSH normal. Rx sent.  Leni Girard

## 2019-02-25 ENCOUNTER — PRENATAL OFFICE VISIT (OUTPATIENT)
Dept: MIDWIFE SERVICES | Facility: CLINIC | Age: 34
End: 2019-02-25
Payer: COMMERCIAL

## 2019-02-25 VITALS
DIASTOLIC BLOOD PRESSURE: 65 MMHG | TEMPERATURE: 97.3 F | WEIGHT: 200 LBS | BODY MASS INDEX: 29.53 KG/M2 | SYSTOLIC BLOOD PRESSURE: 112 MMHG | HEART RATE: 84 BPM

## 2019-02-25 DIAGNOSIS — O09.819 SUPERVISION OF PREGNANCY RESULTING FROM ASSISTED REPRODUCTIVE TECHNOLOGY: Primary | ICD-10-CM

## 2019-02-25 PROCEDURE — 99207 ZZC PRENATAL VISIT: CPT | Performed by: ADVANCED PRACTICE MIDWIFE

## 2019-02-25 NOTE — PROGRESS NOTES
Feeling well.  Baby is active. Denies any leaking of fluid, vaginal bleeding, regular uterine contractions, or headaches or other concerns.  Discussed GBS testing at 36 weeks.    Reviewed to call [phone] for contractions, loss of fluid, vaginal bleeding, decreased fetal movement or any other questions or concerns.    RTC in 2 weeks.  Nora Saeed, JOSE, APRN, CNM

## 2019-03-01 ENCOUNTER — HOSPITAL ENCOUNTER (OUTPATIENT)
Facility: CLINIC | Age: 34
End: 2019-03-01
Admitting: OBSTETRICS & GYNECOLOGY
Payer: COMMERCIAL

## 2019-03-15 ENCOUNTER — PRENATAL OFFICE VISIT (OUTPATIENT)
Dept: OBGYN | Facility: CLINIC | Age: 34
End: 2019-03-15
Payer: COMMERCIAL

## 2019-03-15 VITALS
HEART RATE: 107 BPM | TEMPERATURE: 97.6 F | SYSTOLIC BLOOD PRESSURE: 120 MMHG | BODY MASS INDEX: 30.13 KG/M2 | DIASTOLIC BLOOD PRESSURE: 76 MMHG | WEIGHT: 204 LBS

## 2019-03-15 DIAGNOSIS — O09.819 SUPERVISION OF PREGNANCY RESULTING FROM ASSISTED REPRODUCTIVE TECHNOLOGY: Primary | ICD-10-CM

## 2019-03-15 LAB — HGB BLD-MCNC: 13.6 G/DL (ref 11.7–15.7)

## 2019-03-15 PROCEDURE — 00000218 ZZHCL STATISTIC OBHBG - HEMOGLOBIN: Performed by: OBSTETRICS & GYNECOLOGY

## 2019-03-15 PROCEDURE — 99207 ZZC PRENATAL VISIT: CPT | Performed by: OBSTETRICS & GYNECOLOGY

## 2019-03-15 PROCEDURE — 87653 STREP B DNA AMP PROBE: CPT | Performed by: OBSTETRICS & GYNECOLOGY

## 2019-03-15 PROCEDURE — 36416 COLLJ CAPILLARY BLOOD SPEC: CPT | Performed by: OBSTETRICS & GYNECOLOGY

## 2019-03-15 NOTE — PROGRESS NOTES
36w2d  Active fetal movement. Irregular contractions, not painful. No leaking or bleeding.  GBS today.  Discussed plan if Dr. Levine is on call when she comes in for labor - will transfer to [facility] care.  Reviewed s/sx labor.   RTC weekly.  Melisa Acosta MD

## 2019-03-16 LAB
GP B STREP DNA SPEC QL NAA+PROBE: NEGATIVE
SPECIMEN SOURCE: NORMAL

## 2019-03-21 ENCOUNTER — PRENATAL OFFICE VISIT (OUTPATIENT)
Dept: OBGYN | Facility: CLINIC | Age: 34
End: 2019-03-21
Payer: COMMERCIAL

## 2019-03-21 VITALS
DIASTOLIC BLOOD PRESSURE: 77 MMHG | WEIGHT: 205 LBS | SYSTOLIC BLOOD PRESSURE: 120 MMHG | BODY MASS INDEX: 30.27 KG/M2 | HEART RATE: 100 BPM | TEMPERATURE: 96.6 F

## 2019-03-21 DIAGNOSIS — Z34.03 ENCOUNTER FOR SUPERVISION OF NORMAL FIRST PREGNANCY IN THIRD TRIMESTER: Primary | ICD-10-CM

## 2019-03-21 PROCEDURE — 99207 ZZC PRENATAL VISIT: CPT | Performed by: OBSTETRICS & GYNECOLOGY

## 2019-03-21 RX ORDER — BREAST PUMP
1 EACH MISCELLANEOUS ONCE
Qty: 1 EACH | Refills: 0 | Status: SHIPPED | OUTPATIENT
Start: 2019-03-21 | End: 2019-03-21

## 2019-03-21 RX ORDER — AMOXICILLIN 250 MG
1 CAPSULE ORAL DAILY
Qty: 100 TABLET | Refills: 0 | Status: ON HOLD | COMMUNITY
Start: 2019-03-21 | End: 2019-04-04

## 2019-03-21 RX ORDER — IBUPROFEN 600 MG/1
600 TABLET, FILM COATED ORAL EVERY 6 HOURS PRN
Qty: 60 TABLET | Refills: 0 | COMMUNITY
Start: 2019-03-21 | End: 2019-10-10

## 2019-03-21 RX ORDER — ACETAMINOPHEN 325 MG/1
650 TABLET ORAL EVERY 6 HOURS PRN
Qty: 100 TABLET | Refills: 0 | COMMUNITY
Start: 2019-03-21 | End: 2019-10-10

## 2019-03-21 NOTE — PROGRESS NOTES
37w1d  Active fetal movement. occasional contractions. No leaking, bleeding or abnormal discharge.   Weird taste in her mouth past few days.     GBS: Negative  Hemoglobin   Date Value Ref Range Status   03/15/2019 13.6 11.7 - 15.7 g/dL Final     Breast pump rx: Done  Labor orders: Done  Birth plan: flexible  Length of stay: discussed   Disability paperwork: will bring to next appt  Resident involvement: discussed and agrees.  OTC PP meds: discussed  Planning [facility] for peds    Reviewed labor instructions, kick counts, s/sx pre-eclampsia.  RTC weekly.  Melisa Acosta MD

## 2019-03-24 RX ORDER — ACETAMINOPHEN 325 MG/1
650 TABLET ORAL EVERY 4 HOURS PRN
Status: CANCELLED | OUTPATIENT
Start: 2019-03-24

## 2019-03-24 RX ORDER — ACETAMINOPHEN 325 MG/1
650 TABLET ORAL EVERY 6 HOURS PRN
Qty: 100 TABLET | Refills: 0 | Status: ON HOLD | COMMUNITY
Start: 2019-03-24 | End: 2019-04-04

## 2019-03-24 RX ORDER — AMOXICILLIN 250 MG
1 CAPSULE ORAL DAILY
Qty: 100 TABLET | Refills: 0 | Status: ON HOLD | COMMUNITY
Start: 2019-03-24 | End: 2019-04-04

## 2019-03-24 RX ORDER — ONDANSETRON 2 MG/ML
4 INJECTION INTRAMUSCULAR; INTRAVENOUS EVERY 6 HOURS PRN
Status: CANCELLED | OUTPATIENT
Start: 2019-03-24

## 2019-03-24 RX ORDER — FENTANYL CITRATE 50 UG/ML
50-100 INJECTION, SOLUTION INTRAMUSCULAR; INTRAVENOUS
Status: CANCELLED | OUTPATIENT
Start: 2019-03-24

## 2019-03-24 RX ORDER — OXYTOCIN 10 [USP'U]/ML
10 INJECTION, SOLUTION INTRAMUSCULAR; INTRAVENOUS
Status: CANCELLED | OUTPATIENT
Start: 2019-03-24

## 2019-03-24 RX ORDER — IBUPROFEN 200 MG
800 TABLET ORAL
Status: CANCELLED | OUTPATIENT
Start: 2019-03-24

## 2019-03-24 RX ORDER — SODIUM CHLORIDE, SODIUM LACTATE, POTASSIUM CHLORIDE, CALCIUM CHLORIDE 600; 310; 30; 20 MG/100ML; MG/100ML; MG/100ML; MG/100ML
INJECTION, SOLUTION INTRAVENOUS CONTINUOUS
Status: CANCELLED | OUTPATIENT
Start: 2019-03-24

## 2019-03-24 RX ORDER — IBUPROFEN 600 MG/1
600 TABLET, FILM COATED ORAL EVERY 6 HOURS PRN
Qty: 60 TABLET | Refills: 0 | Status: ON HOLD | COMMUNITY
Start: 2019-03-24 | End: 2019-04-04

## 2019-03-24 RX ORDER — OXYTOCIN/0.9 % SODIUM CHLORIDE 30/500 ML
100-340 PLASTIC BAG, INJECTION (ML) INTRAVENOUS CONTINUOUS PRN
Status: CANCELLED | OUTPATIENT
Start: 2019-03-24

## 2019-03-24 RX ORDER — OXYCODONE AND ACETAMINOPHEN 5; 325 MG/1; MG/1
1 TABLET ORAL
Status: CANCELLED | OUTPATIENT
Start: 2019-03-24

## 2019-03-24 RX ORDER — NALOXONE HYDROCHLORIDE 0.4 MG/ML
.1-.4 INJECTION, SOLUTION INTRAMUSCULAR; INTRAVENOUS; SUBCUTANEOUS
Status: CANCELLED | OUTPATIENT
Start: 2019-03-24

## 2019-03-24 RX ORDER — CARBOPROST TROMETHAMINE 250 UG/ML
250 INJECTION, SOLUTION INTRAMUSCULAR
Status: CANCELLED | OUTPATIENT
Start: 2019-03-24

## 2019-03-24 RX ORDER — METHYLERGONOVINE MALEATE 0.2 MG/ML
200 INJECTION INTRAVENOUS
Status: CANCELLED | OUTPATIENT
Start: 2019-03-24

## 2019-03-26 ENCOUNTER — PRENATAL OFFICE VISIT (OUTPATIENT)
Dept: OBGYN | Facility: CLINIC | Age: 34
End: 2019-03-26
Payer: COMMERCIAL

## 2019-03-26 VITALS
HEART RATE: 79 BPM | SYSTOLIC BLOOD PRESSURE: 115 MMHG | BODY MASS INDEX: 30.66 KG/M2 | OXYGEN SATURATION: 98 % | DIASTOLIC BLOOD PRESSURE: 73 MMHG | WEIGHT: 207 LBS | HEIGHT: 69 IN

## 2019-03-26 DIAGNOSIS — O09.819 SUPERVISION OF PREGNANCY RESULTING FROM ASSISTED REPRODUCTIVE TECHNOLOGY: Primary | ICD-10-CM

## 2019-03-26 PROCEDURE — 99207 ZZC PRENATAL VISIT: CPT | Performed by: OBSTETRICS & GYNECOLOGY

## 2019-03-26 ASSESSMENT — MIFFLIN-ST. JEOR: SCORE: 1713.33

## 2019-04-02 ENCOUNTER — PRENATAL OFFICE VISIT (OUTPATIENT)
Dept: OBGYN | Facility: CLINIC | Age: 34
End: 2019-04-02
Payer: COMMERCIAL

## 2019-04-02 VITALS
OXYGEN SATURATION: 98 % | SYSTOLIC BLOOD PRESSURE: 121 MMHG | HEART RATE: 92 BPM | HEIGHT: 69 IN | DIASTOLIC BLOOD PRESSURE: 78 MMHG | WEIGHT: 207 LBS | BODY MASS INDEX: 30.66 KG/M2

## 2019-04-02 DIAGNOSIS — O09.819 SUPERVISION OF PREGNANCY RESULTING FROM ASSISTED REPRODUCTIVE TECHNOLOGY: Primary | ICD-10-CM

## 2019-04-02 PROCEDURE — 99207 ZZC PRENATAL VISIT: CPT | Performed by: OBSTETRICS & GYNECOLOGY

## 2019-04-02 ASSESSMENT — MIFFLIN-ST. JEOR: SCORE: 1713.33

## 2019-04-04 ENCOUNTER — ANESTHESIA EVENT (OUTPATIENT)
Dept: OBGYN | Facility: CLINIC | Age: 34
End: 2019-04-04
Payer: COMMERCIAL

## 2019-04-04 ENCOUNTER — NURSE TRIAGE (OUTPATIENT)
Dept: NURSING | Facility: CLINIC | Age: 34
End: 2019-04-04

## 2019-04-04 ENCOUNTER — ANESTHESIA (OUTPATIENT)
Dept: OBGYN | Facility: CLINIC | Age: 34
End: 2019-04-04
Payer: COMMERCIAL

## 2019-04-04 ENCOUNTER — HOSPITAL ENCOUNTER (INPATIENT)
Facility: CLINIC | Age: 34
LOS: 2 days | Discharge: HOME HEALTH | End: 2019-04-06
Attending: OBSTETRICS & GYNECOLOGY | Admitting: OBSTETRICS & GYNECOLOGY
Payer: COMMERCIAL

## 2019-04-04 LAB
ABO + RH BLD: NORMAL
ABO + RH BLD: NORMAL
BLD GP AB SCN SERPL QL: NORMAL
BLOOD BANK CMNT PATIENT-IMP: NORMAL
HGB BLD-MCNC: 12.7 G/DL (ref 11.7–15.7)
PLATELET # BLD AUTO: 176 10E9/L (ref 150–450)
SPECIMEN EXP DATE BLD: NORMAL
T PALLIDUM AB SER QL: NONREACTIVE

## 2019-04-04 PROCEDURE — 86901 BLOOD TYPING SEROLOGIC RH(D): CPT | Performed by: OBSTETRICS & GYNECOLOGY

## 2019-04-04 PROCEDURE — 25000125 ZZHC RX 250: Performed by: STUDENT IN AN ORGANIZED HEALTH CARE EDUCATION/TRAINING PROGRAM

## 2019-04-04 PROCEDURE — 0DQR0ZZ REPAIR ANAL SPHINCTER, OPEN APPROACH: ICD-10-PCS | Performed by: OBSTETRICS & GYNECOLOGY

## 2019-04-04 PROCEDURE — 25000132 ZZH RX MED GY IP 250 OP 250 PS 637: Performed by: STUDENT IN AN ORGANIZED HEALTH CARE EDUCATION/TRAINING PROGRAM

## 2019-04-04 PROCEDURE — 25800030 ZZH RX IP 258 OP 636: Performed by: OBSTETRICS & GYNECOLOGY

## 2019-04-04 PROCEDURE — 25000132 ZZH RX MED GY IP 250 OP 250 PS 637

## 2019-04-04 PROCEDURE — 25000128 H RX IP 250 OP 636: Performed by: STUDENT IN AN ORGANIZED HEALTH CARE EDUCATION/TRAINING PROGRAM

## 2019-04-04 PROCEDURE — 59400 OBSTETRICAL CARE: CPT | Mod: 22 | Performed by: OBSTETRICS & GYNECOLOGY

## 2019-04-04 PROCEDURE — 86900 BLOOD TYPING SEROLOGIC ABO: CPT | Performed by: OBSTETRICS & GYNECOLOGY

## 2019-04-04 PROCEDURE — 85018 HEMOGLOBIN: CPT | Performed by: OBSTETRICS & GYNECOLOGY

## 2019-04-04 PROCEDURE — 86850 RBC ANTIBODY SCREEN: CPT | Performed by: OBSTETRICS & GYNECOLOGY

## 2019-04-04 PROCEDURE — 00HU33Z INSERTION OF INFUSION DEVICE INTO SPINAL CANAL, PERCUTANEOUS APPROACH: ICD-10-PCS | Performed by: ANESTHESIOLOGY

## 2019-04-04 PROCEDURE — 25000128 H RX IP 250 OP 636: Performed by: OBSTETRICS & GYNECOLOGY

## 2019-04-04 PROCEDURE — 72200001 ZZH LABOR CARE VAGINAL DELIVERY SINGLE

## 2019-04-04 PROCEDURE — 25000125 ZZHC RX 250: Performed by: OBSTETRICS & GYNECOLOGY

## 2019-04-04 PROCEDURE — 12000001 ZZH R&B MED SURG/OB UMMC

## 2019-04-04 PROCEDURE — 85049 AUTOMATED PLATELET COUNT: CPT | Performed by: OBSTETRICS & GYNECOLOGY

## 2019-04-04 PROCEDURE — 0064U ANTB TP TOTAL&RPR IA QUAL: CPT | Performed by: OBSTETRICS & GYNECOLOGY

## 2019-04-04 PROCEDURE — 3E0R3BZ INTRODUCTION OF ANESTHETIC AGENT INTO SPINAL CANAL, PERCUTANEOUS APPROACH: ICD-10-PCS | Performed by: ANESTHESIOLOGY

## 2019-04-04 RX ORDER — LIDOCAINE 40 MG/G
CREAM TOPICAL
Status: DISCONTINUED | OUTPATIENT
Start: 2019-04-04 | End: 2019-04-04

## 2019-04-04 RX ORDER — METHYLERGONOVINE MALEATE 0.2 MG/ML
200 INJECTION INTRAVENOUS
Status: COMPLETED | OUTPATIENT
Start: 2019-04-04 | End: 2019-04-04

## 2019-04-04 RX ORDER — FENTANYL/BUPIVACAINE/NS/PF 2-1250MCG
PLASTIC BAG, INJECTION (ML) INJECTION
Status: COMPLETED
Start: 2019-04-04 | End: 2019-04-04

## 2019-04-04 RX ORDER — LIDOCAINE HYDROCHLORIDE 10 MG/ML
INJECTION, SOLUTION EPIDURAL; INFILTRATION; INTRACAUDAL; PERINEURAL
Status: DISCONTINUED
Start: 2019-04-04 | End: 2019-04-04 | Stop reason: HOSPADM

## 2019-04-04 RX ORDER — OXYCODONE AND ACETAMINOPHEN 5; 325 MG/1; MG/1
1 TABLET ORAL
Status: DISCONTINUED | OUTPATIENT
Start: 2019-04-04 | End: 2019-04-04

## 2019-04-04 RX ORDER — OXYTOCIN/0.9 % SODIUM CHLORIDE 30/500 ML
1-24 PLASTIC BAG, INJECTION (ML) INTRAVENOUS CONTINUOUS
Status: DISCONTINUED | OUTPATIENT
Start: 2019-04-04 | End: 2019-04-04

## 2019-04-04 RX ORDER — OXYTOCIN/0.9 % SODIUM CHLORIDE 30/500 ML
PLASTIC BAG, INJECTION (ML) INTRAVENOUS
Status: DISCONTINUED
Start: 2019-04-04 | End: 2019-04-04 | Stop reason: HOSPADM

## 2019-04-04 RX ORDER — DOCUSATE SODIUM 100 MG/1
100 CAPSULE, LIQUID FILLED ORAL 2 TIMES DAILY
Status: DISCONTINUED | OUTPATIENT
Start: 2019-04-04 | End: 2019-04-06 | Stop reason: HOSPADM

## 2019-04-04 RX ORDER — SODIUM CHLORIDE, SODIUM LACTATE, POTASSIUM CHLORIDE, CALCIUM CHLORIDE 600; 310; 30; 20 MG/100ML; MG/100ML; MG/100ML; MG/100ML
INJECTION, SOLUTION INTRAVENOUS CONTINUOUS
Status: DISCONTINUED | OUTPATIENT
Start: 2019-04-04 | End: 2019-04-04

## 2019-04-04 RX ORDER — LIDOCAINE HCL/EPINEPHRINE/PF 2%-1:200K
VIAL (ML) INJECTION PRN
Status: DISCONTINUED | OUTPATIENT
Start: 2019-04-04 | End: 2019-04-07 | Stop reason: HOSPADM

## 2019-04-04 RX ORDER — IBUPROFEN 800 MG/1
800 TABLET, FILM COATED ORAL
Status: DISCONTINUED | OUTPATIENT
Start: 2019-04-04 | End: 2019-04-04

## 2019-04-04 RX ORDER — OXYTOCIN/0.9 % SODIUM CHLORIDE 30/500 ML
100-340 PLASTIC BAG, INJECTION (ML) INTRAVENOUS CONTINUOUS PRN
Status: COMPLETED | OUTPATIENT
Start: 2019-04-04 | End: 2019-04-04

## 2019-04-04 RX ORDER — LANOLIN 100 %
OINTMENT (GRAM) TOPICAL
Status: DISCONTINUED | OUTPATIENT
Start: 2019-04-04 | End: 2019-04-06 | Stop reason: HOSPADM

## 2019-04-04 RX ORDER — NALOXONE HYDROCHLORIDE 0.4 MG/ML
.1-.4 INJECTION, SOLUTION INTRAMUSCULAR; INTRAVENOUS; SUBCUTANEOUS
Status: DISCONTINUED | OUTPATIENT
Start: 2019-04-04 | End: 2019-04-04

## 2019-04-04 RX ORDER — METHYLERGONOVINE MALEATE 0.2 MG/ML
INJECTION INTRAVENOUS
Status: DISCONTINUED
Start: 2019-04-04 | End: 2019-04-04 | Stop reason: HOSPADM

## 2019-04-04 RX ORDER — EPHEDRINE SULFATE 50 MG/ML
5 INJECTION, SOLUTION INTRAMUSCULAR; INTRAVENOUS; SUBCUTANEOUS
Status: DISCONTINUED | OUTPATIENT
Start: 2019-04-04 | End: 2019-04-04

## 2019-04-04 RX ORDER — ACETAMINOPHEN 325 MG/1
650 TABLET ORAL EVERY 4 HOURS PRN
Status: DISCONTINUED | OUTPATIENT
Start: 2019-04-04 | End: 2019-04-06 | Stop reason: HOSPADM

## 2019-04-04 RX ORDER — AMOXICILLIN 250 MG
1 CAPSULE ORAL 2 TIMES DAILY
Status: DISCONTINUED | OUTPATIENT
Start: 2019-04-04 | End: 2019-04-06 | Stop reason: HOSPADM

## 2019-04-04 RX ORDER — NALOXONE HYDROCHLORIDE 0.4 MG/ML
.1-.4 INJECTION, SOLUTION INTRAMUSCULAR; INTRAVENOUS; SUBCUTANEOUS
Status: DISCONTINUED | OUTPATIENT
Start: 2019-04-04 | End: 2019-04-06 | Stop reason: HOSPADM

## 2019-04-04 RX ORDER — MISOPROSTOL 200 UG/1
TABLET ORAL
Status: COMPLETED
Start: 2019-04-04 | End: 2019-04-04

## 2019-04-04 RX ORDER — CARBOPROST TROMETHAMINE 250 UG/ML
250 INJECTION, SOLUTION INTRAMUSCULAR
Status: DISCONTINUED | OUTPATIENT
Start: 2019-04-04 | End: 2019-04-06 | Stop reason: HOSPADM

## 2019-04-04 RX ORDER — HYDROCORTISONE 2.5 %
CREAM (GRAM) TOPICAL 3 TIMES DAILY PRN
Status: DISCONTINUED | OUTPATIENT
Start: 2019-04-04 | End: 2019-04-06 | Stop reason: HOSPADM

## 2019-04-04 RX ORDER — OXYTOCIN/0.9 % SODIUM CHLORIDE 30/500 ML
100 PLASTIC BAG, INJECTION (ML) INTRAVENOUS CONTINUOUS
Status: DISCONTINUED | OUTPATIENT
Start: 2019-04-04 | End: 2019-04-06 | Stop reason: HOSPADM

## 2019-04-04 RX ORDER — OXYTOCIN 10 [USP'U]/ML
10 INJECTION, SOLUTION INTRAMUSCULAR; INTRAVENOUS
Status: DISCONTINUED | OUTPATIENT
Start: 2019-04-04 | End: 2019-04-04

## 2019-04-04 RX ORDER — METHYLERGONOVINE MALEATE 0.2 MG/ML
200 INJECTION INTRAVENOUS
Status: DISCONTINUED | OUTPATIENT
Start: 2019-04-04 | End: 2019-04-06 | Stop reason: HOSPADM

## 2019-04-04 RX ORDER — OXYTOCIN 10 [USP'U]/ML
INJECTION, SOLUTION INTRAMUSCULAR; INTRAVENOUS
Status: DISCONTINUED
Start: 2019-04-04 | End: 2019-04-04 | Stop reason: WASHOUT

## 2019-04-04 RX ORDER — MISOPROSTOL 200 UG/1
800 TABLET ORAL
Status: DISCONTINUED | OUTPATIENT
Start: 2019-04-04 | End: 2019-04-06 | Stop reason: HOSPADM

## 2019-04-04 RX ORDER — OXYTOCIN/0.9 % SODIUM CHLORIDE 30/500 ML
340 PLASTIC BAG, INJECTION (ML) INTRAVENOUS CONTINUOUS PRN
Status: DISCONTINUED | OUTPATIENT
Start: 2019-04-04 | End: 2019-04-06 | Stop reason: HOSPADM

## 2019-04-04 RX ORDER — IBUPROFEN 800 MG/1
800 TABLET, FILM COATED ORAL EVERY 6 HOURS PRN
Status: DISCONTINUED | OUTPATIENT
Start: 2019-04-04 | End: 2019-04-06 | Stop reason: HOSPADM

## 2019-04-04 RX ORDER — ONDANSETRON 2 MG/ML
4 INJECTION INTRAMUSCULAR; INTRAVENOUS EVERY 6 HOURS PRN
Status: DISCONTINUED | OUTPATIENT
Start: 2019-04-04 | End: 2019-04-04

## 2019-04-04 RX ORDER — ACETAMINOPHEN 325 MG/1
650 TABLET ORAL EVERY 4 HOURS PRN
Status: DISCONTINUED | OUTPATIENT
Start: 2019-04-04 | End: 2019-04-04

## 2019-04-04 RX ORDER — NALBUPHINE HYDROCHLORIDE 10 MG/ML
2.5-5 INJECTION, SOLUTION INTRAMUSCULAR; INTRAVENOUS; SUBCUTANEOUS EVERY 6 HOURS PRN
Status: DISCONTINUED | OUTPATIENT
Start: 2019-04-04 | End: 2019-04-04

## 2019-04-04 RX ORDER — OXYTOCIN 10 [USP'U]/ML
10 INJECTION, SOLUTION INTRAMUSCULAR; INTRAVENOUS
Status: DISCONTINUED | OUTPATIENT
Start: 2019-04-04 | End: 2019-04-06 | Stop reason: HOSPADM

## 2019-04-04 RX ORDER — CARBOPROST TROMETHAMINE 250 UG/ML
250 INJECTION, SOLUTION INTRAMUSCULAR
Status: DISCONTINUED | OUTPATIENT
Start: 2019-04-04 | End: 2019-04-04

## 2019-04-04 RX ORDER — EPHEDRINE SULFATE 50 MG/ML
INJECTION, SOLUTION INTRAMUSCULAR; INTRAVENOUS; SUBCUTANEOUS
Status: DISCONTINUED
Start: 2019-04-04 | End: 2019-04-04 | Stop reason: WASHOUT

## 2019-04-04 RX ORDER — MISOPROSTOL 200 UG/1
TABLET ORAL
Status: DISCONTINUED
Start: 2019-04-04 | End: 2019-04-04 | Stop reason: WASHOUT

## 2019-04-04 RX ORDER — AMOXICILLIN 250 MG
2 CAPSULE ORAL 2 TIMES DAILY
Status: DISCONTINUED | OUTPATIENT
Start: 2019-04-04 | End: 2019-04-06 | Stop reason: HOSPADM

## 2019-04-04 RX ORDER — FENTANYL CITRATE 50 UG/ML
50-100 INJECTION, SOLUTION INTRAMUSCULAR; INTRAVENOUS
Status: DISCONTINUED | OUTPATIENT
Start: 2019-04-04 | End: 2019-04-04

## 2019-04-04 RX ADMIN — LIDOCAINE HYDROCHLORIDE 20 ML: 10 INJECTION, SOLUTION EPIDURAL; INFILTRATION; INTRACAUDAL; PERINEURAL at 17:05

## 2019-04-04 RX ADMIN — OXYTOCIN-SODIUM CHLORIDE 0.9% IV SOLN 30 UNIT/500ML 2 MILLI-UNITS/MIN: 30-0.9/5 SOLUTION at 06:29

## 2019-04-04 RX ADMIN — LIDOCAINE HYDROCHLORIDE,EPINEPHRINE BITARTRATE 3 ML: 20; .005 INJECTION, SOLUTION EPIDURAL; INFILTRATION; INTRACAUDAL; PERINEURAL at 13:34

## 2019-04-04 RX ADMIN — DOCUSATE SODIUM 100 MG: 100 CAPSULE, LIQUID FILLED ORAL at 21:23

## 2019-04-04 RX ADMIN — SENNOSIDES AND DOCUSATE SODIUM 1 TABLET: 8.6; 5 TABLET ORAL at 21:24

## 2019-04-04 RX ADMIN — IBUPROFEN 800 MG: 800 TABLET ORAL at 20:31

## 2019-04-04 RX ADMIN — OXYTOCIN-SODIUM CHLORIDE 0.9% IV SOLN 30 UNIT/500ML 340 ML/HR: 30-0.9/5 SOLUTION at 16:50

## 2019-04-04 RX ADMIN — SODIUM CHLORIDE, POTASSIUM CHLORIDE, SODIUM LACTATE AND CALCIUM CHLORIDE 1000 ML: 600; 310; 30; 20 INJECTION, SOLUTION INTRAVENOUS at 12:55

## 2019-04-04 RX ADMIN — METHYLERGONOVINE MALEATE 200 MCG: 0.2 INJECTION INTRAVENOUS at 16:58

## 2019-04-04 RX ADMIN — OXYTOCIN-SODIUM CHLORIDE 0.9% IV SOLN 30 UNIT/500ML 100 ML/HR: 30-0.9/5 SOLUTION at 20:06

## 2019-04-04 RX ADMIN — SODIUM CHLORIDE, POTASSIUM CHLORIDE, SODIUM LACTATE AND CALCIUM CHLORIDE: 600; 310; 30; 20 INJECTION, SOLUTION INTRAVENOUS at 06:27

## 2019-04-04 RX ADMIN — Medication 10 ML/HR: at 13:39

## 2019-04-04 RX ADMIN — MISOPROSTOL 400 MCG: 200 TABLET ORAL at 19:40

## 2019-04-04 RX ADMIN — Medication: at 13:52

## 2019-04-04 ASSESSMENT — ACTIVITIES OF DAILY LIVING (ADL)
COGNITION: 0 - NO COGNITION ISSUES REPORTED
FALL_HISTORY_WITHIN_LAST_SIX_MONTHS: NO
DRESS: 0-->INDEPENDENT
AMBULATION: 0-->INDEPENDENT
TOILETING: 0-->INDEPENDENT
RETIRED_EATING: 0-->INDEPENDENT
RETIRED_COMMUNICATION: 0-->UNDERSTANDS/COMMUNICATES WITHOUT DIFFICULTY
BATHING: 0-->INDEPENDENT
SWALLOWING: 0-->SWALLOWS FOODS/LIQUIDS WITHOUT DIFFICULTY
TRANSFERRING: 0-->INDEPENDENT

## 2019-04-04 NOTE — PLAN OF CARE
Pt's vitals are stable. FHR and uterine activity see flow sheet. Feeling uncomfortable and would like epidural. Anesthesiologist is notified. Anticipate .

## 2019-04-04 NOTE — PROGRESS NOTES
[facility] LABOR & DELIVERY PROGRESS NOTE:   2019 2:26 PM          SUBJECTIVE:   Patient reports she's comfortable with epidural.           OBJECTIVE:     Vitals:    19 1400 19 1405 19 1412 19 1415   BP: 110/70 107/72 111/65 115/66   Resp:   18    Temp:   98  F (36.7  C)    TempSrc:   Oral    SpO2: 100% 100% 100% 100%         NST:  FHT:  145/mod/+ accels, a few late decels following epidural placement  Cat:   2  [place]:  q 2 min  SVE:  4/90/-2         LABS:     Recent Results (from the past 12 hour(s))   ABO/Rh type and screen    Collection Time: 19  6:05 AM   Result Value Ref Range    ABO AB     RH(D) Pos     Antibody Screen Neg     Test Valid Only At          [facility]    Specimen Expires 2019    Hemoglobin    Collection Time: 19  6:05 AM   Result Value Ref Range    Hemoglobin 12.7 11.7 - 15.7 g/dL   Platelet count    Collection Time: 19  6:05 AM   Result Value Ref Range    Platelet Count 176 150 - 450 10e9/L              ASSESSMENT / PLAN:   34 year old  at 39w1d admitted with PROM.    Labor: cont pitocin  Fetal well being: Category 2 tracing.  Cont routine intrauterine resuscitative measures  Hypothyroidism:  Cont synthroid  GBS: neg  Pain: epidural adequate    Melisa Boyle MD

## 2019-04-04 NOTE — TELEPHONE ENCOUNTER
"  Caller is pregnant and is having slight cramping and had a gush of clear fluid within the last 30 minutes. Triage guidelines recommend to go to labor and delivery. Caller verbalized and understands directives.  Reason for Disposition    [1] Leakage of fluid from vagina AND [2] leakage started < 4 hours ago    Additional Information    Negative: Passed out (i.e., lost consciousness, collapsed and was not responding)    Negative: Shock suspected (e.g., cold/pale/clammy skin, too weak to stand, low BP, rapid pulse)    Negative: Difficult to awaken or acting confused  (e.g., disoriented, slurred speech)    Negative: [1] SEVERE abdominal pain (e.g., excruciating) AND [2] constant AND [3] present > 1 hour    Negative: Severe bleeding (e.g., continuous red blood from vagina, or large blood clots)    Negative: Umbilical cord hanging out of the vagina (shiny, white, curled appearance, \"like telephone cord\")    Negative: Uncontrollable urge to push (i.e., feels like baby is coming out now)    Negative: Sounds like a life-threatening emergency to the triager    Negative: Can see baby    Negative: Pregnant < 37 weeks (i.e., )    Negative: [1] Uncertain delivery date AND [2] possibly pregnant < 37 weeks (i.e., )    Negative: [1] First baby (primipara) AND [2] contractions < 6 minutes apart  AND [3] present 2 hours    Negative: [1] History of prior delivery (multipara) AND [2] contractions < 10 minutes apart AND [3] present 1 hour    Negative: [1] History of rapid prior delivery AND [2] contractions < 10 minutes apart    Negative: [1] Leakage of fluid from vagina AND [2] green or brown in color    Negative: [1] Leakage of fluid from vagina AND [2] leakage started > 4 hours ago    Negative: Vaginal bleeding or spotting    Negative: Baby moving less today (e.g., kick count < 5 in 1 hour or < 10 in 2 hours)    Negative: New hand or face swelling    Negative: MODERATE-SEVERE abdominal pain    Negative: Fever > 100.4 F " (38.0 C)    Protocols used: PREGNANCY - LABOR-ADULT-AH

## 2019-04-04 NOTE — PLAN OF CARE
Rupture of Membranes Eval Admit Note  Lolis Johnson     Gestational Age: 39w1d      Lolis Johnson is a  here for rule out rupture of membranes.  States felt leaking of moderate of clear fluid at 0430.  States bleeding Present and Absent.  Cramping since 0430.  No health or pregnancy issues.      Dr Gupta notified of arrival and condition.  Oriented patient to surroundings. Call light within reach.     FHT: Per EFM  Uterine Assessment: pt states feeling cramping.  Oriented patient to surroundings. Call light within reach.     Plan:   -Continuous fetal/uterine monitoring  -SSE

## 2019-04-04 NOTE — PROVIDER NOTIFICATION
04/04/19 0648   Provider Notification   Provider Name/Title Dr Gupta   Method of Notification Electronic Page   Request Evaluate - Remote   Notification Reason Other (Comment)   Pt on clear liquid diet. Would like to order breakfast. Telephone order with readback from Dr Gupta to change to regular diet.

## 2019-04-04 NOTE — H&P
"[facility]  OB History and Physical      Lolis Johnson MRN# [id number]   Age: 34 year old YOB: 1985     CC:  \"Bag of water broke\"     HPI:  Ms. Lolis Johnson is a 34 year old  at 39w1d by ETD, who presents with leakage of fluid.  She has mild cramping pain. No vaginal bleeding, and loss of fluid.   + normal fetal movement.  Denies symptoms of HA, vision changes, SOB, chest pain, fevers, chills, palpitations, nausea, vomiting, RUQ pain, dysuria, constipation, diarrhea.     Pregnancy Complications:  1. Hypothyroidism  2. 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 trachomatis rRNA by transcription mediated amplification.   A negative result by transcription mediated amplification does not preclude the   presence of C. trachomatis infection because results are dependent on proper   and adequate collection, absence of inhibitors, and sufficient rRNA to be   detected.      GCPCRT  2017     Negative   Negative for N. gonorrhoeae rRNA by transcription mediated amplification.   A negative result by transcription mediated amplification does not preclude the   presence of N. gonorrhoeae infection because results are dependent on proper   and adequate collection, absence of inhibitors, and sufficient rRNA to be   detected.      TREPAB Negative 2017    HGB 13.6 03/15/2019       GBS Status:   Lab Results   Component Value Date    GBS Negative 03/15/2019       Ultrasounds  7w3d  Anatomy 18w2d: normal anatomy, ?small uterine synechiae, low lying placenta 1.6cm from os  Fetal echo 21w6d: wnl   F/u growth: 28w5d low lying placenta resolved    OB History  Obstetric History       T0      L0     SAB0   TAB0   Ectopic0   Multiple1   Live Births0       # Outcome Date " GA Lbr Jose/2nd Weight Sex Delivery Anes PTL Lv   2 Current            1A Para 17 19w1d  0.278 kg (9.8 oz) M Vag-Spont Nitrous, IV REGIONAL Y FD      Name: JERONIMO LOPEZ FD      Complications: Prolonged PROM (>18 hours)      Apgar1:  0                Apgar5: 0   1B Para 17 19w1d  0.188 kg (6.6 oz) F Vag-Spont IV REGIONAL, Nitrous Y FD      Name: SAE LOPEZ FD      Complications: Prolonged PROM (>18 hours)      Apgar1:  0                Apgar5: 0          PMHx:   Past Medical History:   Diagnosis Date     Subclinical hypothyroidism 2016     PSHx:   Past Surgical History:   Procedure Laterality Date     SINUS SURGERY       Meds:   Medications Prior to Admission   Medication Sig Dispense Refill Last Dose     levothyroxine (SYNTHROID/LEVOTHROID) 75 MCG tablet Take 1 tablet (75 mcg) by mouth daily 30 tablet 1 2019 at Unknown time     Prenatal Vit-Fe Fumarate-FA (PRENATAL MULTIVITAMIN PLUS IRON) 27-0.8 MG TABS per tablet Take 1 tablet by mouth daily   4/3/2019 at Unknown time     acetaminophen (TYLENOL) 325 MG tablet Take 2 tablets (650 mg) by mouth every 6 hours as needed for mild pain Start after Delivery. 100 tablet 0 More than a month at Unknown time     acetaminophen (TYLENOL) 325 MG tablet Take 2 tablets (650 mg) by mouth every 6 hours as needed for mild pain Start after Delivery. 100 tablet 0 More than a month at Unknown time     cetirizine (ZYRTEC) 10 MG tablet Take 10 mg by mouth   More than a month at Unknown time     ibuprofen (ADVIL/MOTRIN) 600 MG tablet Take 1 tablet (600 mg) by mouth every 6 hours as needed for moderate pain Start after delivery 60 tablet 0 More than a month at Unknown time     ibuprofen (ADVIL/MOTRIN) 600 MG tablet Take 1 tablet (600 mg) by mouth every 6 hours as needed for moderate pain Start after delivery 60 tablet 0 More than a month at Unknown time     [] Misc. Devices (BREAST PUMP) MISC 1 each once for 1 dose 1 each 0      [] ondansetron  (ZOFRAN-ODT) 4 MG ODT tab Take 1-2 tablets (4-8 mg) by mouth every 8 hours as needed for nausea or vomiting 30 tablet 1      Allergies:    Allergies   Allergen Reactions     Sulfa Drugs       FmHx:   Family History   Problem Relation Age of Onset     Breast Cancer Paternal Grandmother      SocHx: She denies any tobacco, alcohol, or other drug use during this pregnancy.    ROS:   Complete 10-point ROS negative except as noted in HPI. She denies headache, blurry vision, chest pain, shortness of breath, RUQ pain, nausea, vomiting, dysuria, hematuria or extremity edema.    PE:  Vit: Pulse 74 /68 O2 98% on RA  Gen: Well-appearing, NAD, comfortable   CV: rrr, no mrg   Pulm: Ctab, no wheezes or crackles   Abd: Soft, gravid, non-tender  Ext: trace LE edema b/l  Cx: 3/80/-4     Pres:  Cephalic by BSUS  EFW:  7lbs by [place]  Memb: PROM, clr 0430             FHT: Baseline 150bpm, moderate variability, no accelerations, no decelerations   [place]: 0 contractions in 10 minutes      Assessment  Ms. Lolis Johnson is a 34 year old , at 39w1d by ETD, who presents with PROM.    Plan  - Labor   - Admit to L&D. Expectant management vs augmentation with pitocin discussed. Will wait ~1hr and start pitocin.    - PNC:     - Rh positive. Rubella immune. GBS negative. No intervention indicated.     - Placenta: posterior   - Fen/GI: Clear liquid diet, IVF   - SVE: 3/60/-3   - Membranes: PROM, clear (0430)   - Pain management: Undecided, options discussed    - Hypothyroidism, continue Synthroid 75mcg. Decrease to prepregnancy dose after delivery.    -Fetal Well Being   - Category I FHT. Reactive and reassuring   - Cephalic by BSUS. EFW 7lbs   - Continue EFM and [place]    The patient was discussed with Dr. Boyle who is in agreement with the treatment plan.    Yi Gupta MD, MHS  OB/GYN Resident, PGY-3  2019

## 2019-04-04 NOTE — PROGRESS NOTES
[facility] PROGRESS NOTE:   2019 8:07 AM         SUBJECTIVE:   Patient reports mild cramping.  Leaking clear fluid.           OBJECTIVE:     Vitals:    19 0515 19 0700   BP: 116/78    Resp: 18 18   Temp: 98.2  F (36.8  C) 97.9  F (36.6  C)   TempSrc: Oral Oral         NST:  FHT:  135/mod/+ accels, no decels  Cat:   1  [place]:  q 2-5 min  SVE:  deferred         LABS:     Recent Results (from the past 12 hour(s))   ABO/Rh type and screen    Collection Time: 19  6:05 AM   Result Value Ref Range    ABO AB     RH(D) Pos     Antibody Screen Neg     Test Valid Only At          [facility]    Specimen Expires 2019    Hemoglobin    Collection Time: 19  6:05 AM   Result Value Ref Range    Hemoglobin 12.7 11.7 - 15.7 g/dL   Platelet count    Collection Time: 19  6:05 AM   Result Value Ref Range    Platelet Count 176 150 - 450 10e9/L              ASSESSMENT / PLAN:   34 year old  at 39w1d admitted with PROM.    Labor: cont pitocin  Fetal well being: Category 1 tracing.  Hypothyroidism:  Cont synthroid  GBS: neg  Pain: may have epidural if desired    Melisa Boyle MD

## 2019-04-04 NOTE — ANESTHESIA PREPROCEDURE EVALUATION
"Anesthesia Pre-Procedure Evaluation    Patient: Lolis Johnson   MRN:     [id number] Gender:   female   Age:    34 year old :      1985        Preoperative Diagnosis: * No pre-op diagnosis entered *   * No procedures listed *     Past Medical History:   Diagnosis Date     Subclinical hypothyroidism 2016      Past Surgical History:   Procedure Laterality Date     SINUS SURGERY            Anesthesia Evaluation       history and physical reviewed .             ROS/MED HX    ENT/Pulmonary:  - neg pulmonary ROS     Neurologic:  - neg neurologic ROS     Cardiovascular:  - neg cardiovascular ROS       METS/Exercise Tolerance:     Hematologic:         Musculoskeletal:         GI/Hepatic:         Renal/Genitourinary:         Endo:         Psychiatric:         Infectious Disease:         Malignancy:         Other:                     [facility] AN PHYSICAL EXAM    Lab Results   Component Value Date    WBC 6.2 2018    HGB 12.7 2019    HCT 40.7 2018     2019    PTT 26 2017    INR 1.02 2017    FIBR 638 (H) 2017    TSH 1.39 2018    T4 1.02 2015       Preop Vitals  BP Readings from Last 3 Encounters:   19 107/63   19 121/78   19 115/73    Pulse Readings from Last 3 Encounters:   19 92   19 79   19 100      Resp Readings from Last 3 Encounters:   19 18   17 16    SpO2 Readings from Last 3 Encounters:   19 98%   19 98%   19 100%      Temp Readings from Last 1 Encounters:   19 36.6  C (97.8  F) (Oral)    Ht Readings from Last 1 Encounters:   19 1.753 m (5' 9\")      Wt Readings from Last 1 Encounters:   19 93.9 kg (207 lb)    Estimated body mass index is 30.57 kg/m  as calculated from the following:    Height as of 19: 1.753 m (5' 9\").    Weight as of 19: 93.9 kg (207 lb).     LDA:  Peripheral IV 19 Left Lower forearm (Active)   Site Assessment WDL 2019 12:30 PM "   Line Status Infusing 4/4/2019 12:30 PM   Phlebitis Scale 0-->no symptoms 4/4/2019 12:30 PM   Infiltration Scale 0 4/4/2019 12:30 PM   Number of days: 0       Intrathecal/Epidural Catheter 04/04/19 (Active)   Number of days: 0            [facility] AN PLAN NO PONV RULE    neg OB ROS                   Shawnee Pederson MD

## 2019-04-04 NOTE — ANESTHESIA PROCEDURE NOTES
Epidural Procedure Note    Staff:     Anesthesiologist:  Summer Ledezma MD    Resident/CRNA:  Shawnee Pederson MD    Procedure performed by resident/CRNA in the presence of a teaching physician    Location: floor and OB   Pre-procedure checklist:   patient identified, IV checked, site marked, risks and benefits discussed, informed consent, monitors and equipment checked, pre-op evaluation and at physician/surgeon's request      Correct Patient: Yes      Correct Position: Yes      Correct Site: Yes      Correct Procedure: Yes      Correct Laterality:  N/A    Site Marked:  N/A  Procedure:     Procedure:  Epidural catheter    ASA:  2    Position:  Sitting    Sterile Prep: chloraprep      Insertion site:  L3-4    Local skin infiltration:  1% lidocaine    amount (mL):  3    Approach:  Midline    Needle gauge (G):  17    Needle Length (in):  5    Block Needle Type:  [place]    Injection Technique:  LORT saline    PRANEETH at (cm):  6.5    Attempts:  1    Redirects:  0    Catheter gauge (G):  20    Catheter threaded easily: Yes      Threaded to cm at skin:  10.5    Threaded in epidural space (cm):  4    Paresthesias:  No    Aspiration negative for Heme or CSF: Yes      Test dose (mL):  3     Local anesthetic:  Lidocaine 1.5% w/ 1:200,000 epinephrine    Test dose negative for signs of intravascular, subdural or intrathecal injection: Yes

## 2019-04-05 LAB — HGB BLD-MCNC: 10.6 G/DL (ref 11.7–15.7)

## 2019-04-05 PROCEDURE — 36415 COLL VENOUS BLD VENIPUNCTURE: CPT | Performed by: STUDENT IN AN ORGANIZED HEALTH CARE EDUCATION/TRAINING PROGRAM

## 2019-04-05 PROCEDURE — 25000132 ZZH RX MED GY IP 250 OP 250 PS 637: Performed by: STUDENT IN AN ORGANIZED HEALTH CARE EDUCATION/TRAINING PROGRAM

## 2019-04-05 PROCEDURE — 12000001 ZZH R&B MED SURG/OB UMMC

## 2019-04-05 PROCEDURE — 85018 HEMOGLOBIN: CPT | Performed by: STUDENT IN AN ORGANIZED HEALTH CARE EDUCATION/TRAINING PROGRAM

## 2019-04-05 RX ORDER — LEVOTHYROXINE SODIUM 50 UG/1
50 TABLET ORAL
Status: DISCONTINUED | OUTPATIENT
Start: 2019-04-05 | End: 2019-04-06 | Stop reason: HOSPADM

## 2019-04-05 RX ADMIN — IBUPROFEN 800 MG: 800 TABLET ORAL at 04:13

## 2019-04-05 RX ADMIN — SENNOSIDES AND DOCUSATE SODIUM 1 TABLET: 8.6; 5 TABLET ORAL at 08:06

## 2019-04-05 RX ADMIN — LEVOTHYROXINE SODIUM 50 MCG: 50 TABLET ORAL at 08:06

## 2019-04-05 RX ADMIN — DOCUSATE SODIUM 100 MG: 100 CAPSULE, LIQUID FILLED ORAL at 20:04

## 2019-04-05 RX ADMIN — IBUPROFEN 800 MG: 800 TABLET ORAL at 10:58

## 2019-04-05 RX ADMIN — DOCUSATE SODIUM 100 MG: 100 CAPSULE, LIQUID FILLED ORAL at 08:06

## 2019-04-05 RX ADMIN — IBUPROFEN 800 MG: 800 TABLET ORAL at 18:06

## 2019-04-05 RX ADMIN — SENNOSIDES AND DOCUSATE SODIUM 1 TABLET: 8.6; 5 TABLET ORAL at 20:04

## 2019-04-05 RX ADMIN — ACETAMINOPHEN 650 MG: 325 TABLET, FILM COATED ORAL at 06:33

## 2019-04-05 RX ADMIN — IBUPROFEN 800 MG: 800 TABLET ORAL at 23:47

## 2019-04-05 NOTE — PLAN OF CARE
Vital signs stable and FF at U/1 with small lochia. Perineum is slightly swollen and pt is using tucks for comfort. Pt soaked in the tub this morning. Pt is up voiding with no problem. Breastfeeding is improving with minimal assist latching properly and positioning. Nipples are tender and pt was given the lanolin cream with instructions.  Pt complains of cramping. Pain is well managed with Ibuprofen for cramping. Assisted with breastfeeding. Encouraged pt to soak in the tub twice a day. Continue cares.

## 2019-04-05 NOTE — PLAN OF CARE
Data: Vital signs within normal limits. Postpartum checks within normal limits - see flow record. Few clots seen after pt voided. IV pitocin infusion completed at 0145. Patient eating and drinking normally. Patient able to empty bladder independently and is up ambulating. No apparent signs of infection. 2nd degree laceration  healing well. Patient performing self cares and is able to care for infant.  Action: Patient declined medication - will call RN if she needs it. Patient education done about basic infant care, hand expression, achieving a deeper latch, and signs and symptoms of preeclampsia.   Response: Positive attachment behaviors observed with infant. Need more support with latching infant to breast. Spouse present, supportive with cares.  Plan: Continue plan of care.

## 2019-04-05 NOTE — PLAN OF CARE
Data: Vital signs within normal limits. Postpartum checks within normal limits - see flow record. Patient eating and drinking normally. Patient able to empty bladder independently and will call RN for first time walking to bathroom. No apparent signs of infection. Laceration on periarea is WDL . Patient performing self cares and is able to care for infant. Spouse at bedside holding infant.  Action: Patient medicated during the shift with ibuprofen for cramping. See MAR. Patient reassessed within 1 hour after each medication and pain was improved - patient stated she was comfortable. Patient education done about breastfeeding, infant cares, and plan of care. See flow record.  Response: Positive attachment behaviors observed with infant. Support persons spouse present.   Plan: Anticipate discharge on 1-2 days.

## 2019-04-05 NOTE — PROVIDER NOTIFICATION
04/04/19 2015   Provider Notification   Provider Name/Title Dr. Gupta   Method of Notification In Department   Request Evaluate-Remote   Notification Reason Status Update     Notified provider patient's QBL is 1386. Provider requesting second bag of pit, and oral miso to be given. Providers ok with ibuprofen administration.

## 2019-04-05 NOTE — DISCHARGE SUMMARY
VAGINAL DELIVERY DISCHARGE SUMMARY    Admit date: 2019   Discharge date: 2019    Admit Dx:   - 34 year old y/o @ 39w1d GA  - PROM  - Hypothyroidism  - Hx of second tri loss  - GBS negative status  - Rh positive status    Discharge Dx:  - Same as above, s/p delivery via   - Third degree laceration  - Postpartum hemorrhage    Procedures:  - Epidural analgesia      Hospital course:  Ms. Lolis Johnson is a 34 year old  at 39w1d by ETD, who presents with leakage of fluid.  She has mild cramping pain. No vaginal bleeding, and loss of fluid.   + normal fetal movement.  Denies symptoms of HA, vision changes, SOB, chest pain, fevers, chills, palpitations, nausea, vomiting, RUQ pain, dysuria, constipation, diarrhea.     She was 3/60/-4 on admission and pitocin was started for augmentation. FHR was category I. She progressed to complete cervical dilation and delivered a liveborn female infant via  at 1646 on 19 with APGARs of 8 and 9 weighing 7lbs 3oz. Please see her admission H&P and Delivery Summary for full details regarding her admission and delivery.    Her postpartum course was uncomplicated. On PPD#2, she was meeting all of her postpartum goals and deemed stable for discharge. She was voiding without difficulty, tolerating a regular diet without nausea and vomiting, her pain was well controlled on oral pain medicines and her lochia was appropriate. Her hemoglobin prior to delivery was 12.7 and after delivery was 10.6. Her Rh status was positive and RHOgam was not indicated. At the time of discharge, she was breastfeeding her infant and was undecided for contraception.    Discharge/Disposition:  Ms. Lolis Johnson was discharged to home in stable condition with the following instructions/medications:  1) Call for temperature > 100.4, foul smelling vaginal discharge, bleeding > 1 pad per hour x 2 hrs, pain not controlled by oral pain meds, severe constipation or severe nausea or  vomiting.  2) She was undecided for contraception and planned to decide by the 6 week PP visit.  3) She was instructed to follow-up with her primary OB in 6 weeks for a routine postpartum visit.  4) She was instructed to continue her PNV on discharge if she wished to breast feed her infant.  5) She was discharged home with the following medications:      Review of your medicines      UNREVIEWED medicines. Ask your doctor about these medicines      Dose / Directions   breast pump Misc  Used for:  Encounter for supervision of normal first pregnancy in third trimester  Ask about: Should I take this medication?      Dose:  1 each  1 each once for 1 dose  Quantity:  1 each  Refills:  0        START taking      Dose / Directions   senna-docusate 8.6-50 MG tablet  Commonly known as:  SENOKOT-S/PERICOLACE      Dose:  2 tablet  Take 2 tablets by mouth 2 times daily as needed for constipation  Quantity:  60 tablet  Refills:  1        CONTINUE these medicines which may have CHANGED, or have new prescriptions. If we are uncertain of the size of tablets/capsules you have at home, strength may be listed as something that might have changed.      Dose / Directions   acetaminophen 325 MG tablet  Commonly known as:  TYLENOL  This may have changed:  Another medication with the same name was removed. Continue taking this medication, and follow the directions you see here.  Used for:  Encounter for supervision of normal first pregnancy in third trimester      Dose:  650 mg  Take 2 tablets (650 mg) by mouth every 6 hours as needed for mild pain Start after Delivery.  Quantity:  100 tablet  Refills:  0     ibuprofen 600 MG tablet  Commonly known as:  ADVIL/MOTRIN  This may have changed:  Another medication with the same name was removed. Continue taking this medication, and follow the directions you see here.  Used for:  Encounter for supervision of normal first pregnancy in third trimester      Dose:  600 mg  Take 1 tablet (600 mg) by  mouth every 6 hours as needed for moderate pain Start after delivery  Quantity:  60 tablet  Refills:  0        CONTINUE these medicines which have NOT CHANGED      Dose / Directions   cetirizine 10 MG tablet  Commonly known as:  zyrTEC      Dose:  10 mg  Take 10 mg by mouth  Refills:  0     levothyroxine 75 MCG tablet  Commonly known as:  SYNTHROID/LEVOTHROID  Used for:  Other specified hypothyroidism      Dose:  75 mcg  Take 1 tablet (75 mcg) by mouth daily  Quantity:  30 tablet  Refills:  1     prenatal multivitamin w/iron 27-0.8 MG tablet      Dose:  1 tablet  Take 1 tablet by mouth daily  Refills:  0        STOP taking    ondansetron 4 MG ODT tab  Commonly known as:  ZOFRAN-ODT              Where to get your medicines      These medications were sent to [facility] [address]  [address] [address]    Phone:  [phone]     senna-docusate 8.6-50 MG tablet           Xuan Jimenez MD  PGY-1  Ob/Gyn    I, Angélica Gonzalez MD, personally saw and evaluated this patient.  I discussed the patient with the resident and care team, and agree with the assessment and plan of care as documented in the resident's note of 04/06/19.  I personally reviewed vital signs, medications, lab, and exam.     Key Findings:  Meeting goals for discharge.  Abdomen non-tender, fundus firm.       PLAN:  Discharge home.     Angélica Gonzalez MD   Date of Service (when I saw the patient) 04/06/19   '

## 2019-04-05 NOTE — L&D DELIVERY NOTE
DELIVERY SUMMARY    Lolis Johnson MRN# [id number]   Age: 34 year old YOB: 1985     Lolis Johnson is a 35 yo  at 39w1d gestation who was admitted to labor and delivery for premature rupture of membranes. Her pregnancy was otherwise complicated by hypothyroidism, on synthroid, history of second trimester twin gestation loss. SVE on admission was 3/60/-4. She received IV Pitocin for augmentation and eventually an epidural for labor analgesia. Shortly after placement of epidural SVE was performed and was notably 4/0/-2. She progressed quickly over the course of 1 hour and repeat cervical exam was performed and patient was notably complete and +1 station, requesting to push due to pelvic pressure. The patient pushed with excellent maternal effort and delivered a liveborn female infant in direct OA presentation at 1646, APGARs of 8 and 9 at 1 and 5 minutes respectively, weight 3260 g. A nuchal and body cord were reduced following delivery. The infant was placed to the mother's abdomen and delayed cord clamping performed. Cord blood and a segment of cord were collected. The placenta delivered shortly thereafter with suprapubic pressure and gentle cord traction, examined and noted to be intact with 3 vessel cord. The patient received vigorous fundal massage, IV Pitocin and IM Methergine x1 for treatment of uterine atony. Vaginal exam was performed and demonstrated a third degree laceration. The external anal sphincter was both reapproximated and reinforced with 3 figure of X sutures using 2-0 Vicryl in end-to-end manner. Rectal exam performed following reinforcement noted good tone, no stitches in rectum. The remaining second degree component of the incision was repaired with 3-0 Vicryl in the standard fashion. QBL 1284 ml. Dr. Melisa Boyle was present for the entirety of the delivery and repair.     ASSESSMENT & PLAN: stable to postpartum    [place] Assessment Tool Data    Gestational Age:  Gestational  Age: 39w1d     Maternal temperature range:  Temp  Av.9  F (36.6  C)  Min: 97.5  F (36.4  C)  Max: 98.2  F (36.8  C)    Membranes ruptured for:   12h 16m     GBS status:  Lab Results   Component Value Date    GBS Negative 03/15/2019       Antibiotic Status:  Antibiotics         IV Antibiotic Given         Additional Management      Fetal Status Prior to  Delivery      Fetal Status Comments         Sepsis Prebirth Score:       Sepsis Postbirth Score:       Determination based on clinical exam after birth:       Disposition:           Labor Event Times    Labor onset date:  19 Onset time:  12:55 PM   Dilation complete date:  19 Complete time:   3:10 PM   Start pushing date/time:  2019 1520      Labor Length    1st Stage (hrs):  2 (min):  15   2nd Stage (hrs):  1 (min):  36   3rd Stage (hrs):  0 (min):  6      Labor Events    Labor Type:  Augmentation  Predominate monitoring during 1st stage:  continuous electronic fetal monitoring     Rupture date/time: 19 0430   Rupture type:  Spontaneous rupture of membranes occuring during spontaneous labor or augmentation  Fluid color:  Clear  Fluid odor:  Normal     1:1 continuous labor support provided by?:  RN Labor partogram used?:  no      Delivery/Placenta Date and Time    Delivery Date:  19 Delivery Time:   4:46 PM   Placenta Date/Time:  2019  4:52 PM  Oxytocin given at the time of delivery:  after delivery of baby     Vaginal Counts     Initial count performed by 2 team members:   Two Team Members   william Boyle       Needles Suture [place] Sponges Instruments   Initial counts 2 0 5    Added to count  3 5    Final counts 2 3 10    Placed during labor Accounted for at the end of labor   No NA   No NA   No NA    Final count performed by 2 team members:   Two Team Members   william Boyle      Final count correct?:  Yes     Apgars    Living status:  Living   1 Minute 5 Minute 10 Minute 15 Minute 20  "Minute   Skin color: 1  1       Heart rate: 2  2       Reflex irritability: 2  2       Muscle tone: 2  2       Respiratory effort: 1  2       Total: 8  9       Apgars assigned by:  SYDNEY BAINS RN     Cord    Vessels:  3 Vessels Complications:  Nuchal   Cord Blood Disposition:  Lab Gases Sent?:  No       Resuscitation    Methods:  None  [place] Care at Delivery:   of baby girl. Delivered to mother's abdomen. Dried and stimulated. Bulb suction to mouth to remove secretions.      [place] Measurements    Weight:  7 lb 3 oz Length:  1' 9\"   Head circumference:  33.7 cm       Skin to Skin and Feeding Plan    Skin to skin initiation date/time: 1841    Skin to skin with:  Mother  Skin to skin end date/time:     Breastfeeding initiated date/time:  2019 1740  How do you plan to feed your baby:  Breastfeeding     Delivery (Maternal) (Provider to Complete) ([id number])       Blood Loss  Mother: Lolis Johnson MARTHA #[id number]   Start of Mother's Information    IO Blood Loss  19 1255 - 19 2101    Total QBL Blood Loss (mL) Hospital Encounter 1284 mL    Total  1284 mL         End of Mother's Information  Mother: Lolis Johnson MARTHA #[id number]         Delivery - Provider to Complete ([id number])    Attempted Delivery Types (Choose all that apply):  Spontaneous Vaginal Delivery  Delivery Type (Choose the 1 that will go to the Birth History):  Vaginal, Spontaneous          Placenta    Date/Time:  2019  4:52 PM     Anesthesia    Method:  Epidural, Local             Nicki Allison MD  OBGYN PGY-4  [phone]  9:00 PM 2019    "

## 2019-04-05 NOTE — PROGRESS NOTES
Pt arrived on unit at approximately 2030.  She was oriented to room and unit and all questions were answered.  Bands checked with mom/dad and baby.

## 2019-04-05 NOTE — PLAN OF CARE
Patient fundus firm, midline U/1, bleeding small. Patient up to bathroom. Able to void. Patient dizzy when standing up and had 1 emesis. Patient stable upon transfer.

## 2019-04-05 NOTE — PROGRESS NOTES
Postpartum Progress Note  Lolis Johnson  [id number]    Subjective:   Ambulating without dizziness, eating and drinking without nausea. Lochia about the amount of a typical period. Voiding spontaneously. Passing gas, no BM yet. Pain well controlled on oral medications. Breastfeeding, some issues with latch.  No headache, vision changes, CP, SOB, RUQ pain.     Objective:  /60   Pulse 76   Temp 98  F (36.7  C) (Oral)   Resp 18   SpO2 97%   Breastfeeding? Unknown     General: NAD, comfortable resting in bed  Heart: Regular rate, extremities warm and well-perfused  Lungs: Breathing comfortably, on room air  Abdomen: Soft, non-tender; fundus firm and 0 cm below umbilicus  Extremities: trace edema in BLE    Weight: Patient declined being weighed.    Labs:    Hemoglobin   Date Value Ref Range Status   2019 12.7 11.7 - 15.7 g/dL Final   03/15/2019 13.6 11.7 - 15.7 g/dL Final     Assessment/Plan:   34 year old  who is PPD#1 s/p . Pregnancy was notable for hypothyroidism on synthroid. Currently stable and doing well.     # Hypothyroidism   - Re-start pre-pregnancy dose synthroid 50 mcg     # Postpartum  - Continue routine post-partum cares.     - Heme: Hgb 12.7 > pending repeat this AM.  QBL 1284 s/p pitocin, methergine x1, misoprostol.  - Pain: Continue oral meds   - GI: scheduled senokot, colace. PRN miralax daily, simethicone, anti-emetics.  - : Voiding spontaneously  - Baby: Doing well. Stable, breastfeeding  - Contraception: undecided  - Rh positive, Rubella immune, GBS negative  - PPx: Encourage ambulation    Dispo: Discharge to home when meeting postpartum goals    Yissel Graff MD  [facility] PGY-1  Pager [phone]     Attestation:   This patient was seen and evaluated by me, separately from the house staff team. I have reviewed the note/plan above and agree.     Hemoglobin   Date Value Ref Range Status   2019 10.6 (L) 11.7 - 15.7 g/dL Final   ]  Discontinue IV  today and routine cares. Discussed 3rd degree laceration and sitz baths with bowel regimen, questions answered. Plan discharge home tomorrow.    Xuna Anders MD

## 2019-04-05 NOTE — PLAN OF CARE
Data: Lolis Johnson transferred to 7130 via wheelchair at 2085. Baby transferred via crib.  Action: Receiving unit notified of transfer: Yes. Patient and family notified of room change. Report given to Roshni BONILLA  at 2000. Belongings sent to receiving unit. Accompanied by Registered Nurse. Oriented patient to surroundings. Call light within reach. ID bands double-checked with receiving RN.  Response: Patient tolerated transfer and is stable.

## 2019-04-06 VITALS
SYSTOLIC BLOOD PRESSURE: 105 MMHG | TEMPERATURE: 97.8 F | HEART RATE: 109 BPM | OXYGEN SATURATION: 97 % | RESPIRATION RATE: 18 BRPM | DIASTOLIC BLOOD PRESSURE: 62 MMHG

## 2019-04-06 PROCEDURE — 25000132 ZZH RX MED GY IP 250 OP 250 PS 637: Performed by: STUDENT IN AN ORGANIZED HEALTH CARE EDUCATION/TRAINING PROGRAM

## 2019-04-06 RX ORDER — AMOXICILLIN 250 MG
2 CAPSULE ORAL 2 TIMES DAILY PRN
Qty: 60 TABLET | Refills: 1 | Status: SHIPPED | OUTPATIENT
Start: 2019-04-06 | End: 2019-10-10

## 2019-04-06 RX ADMIN — LEVOTHYROXINE SODIUM 50 MCG: 50 TABLET ORAL at 07:50

## 2019-04-06 RX ADMIN — IBUPROFEN 800 MG: 800 TABLET ORAL at 11:36

## 2019-04-06 RX ADMIN — IBUPROFEN 800 MG: 800 TABLET ORAL at 05:34

## 2019-04-06 RX ADMIN — ACETAMINOPHEN 650 MG: 325 TABLET, FILM COATED ORAL at 10:45

## 2019-04-06 RX ADMIN — DOCUSATE SODIUM 100 MG: 100 CAPSULE, LIQUID FILLED ORAL at 07:51

## 2019-04-06 NOTE — PROGRESS NOTES
Postpartum Progress Note  Lolis Johnson  [id number]    Subjective:   Ambulating without dizziness, eating and drinking without nausea. Lochia about the amount of a typical period. Voiding spontaneously. Passing gas, no BM yet. Pain well controlled on oral medications. Breastfeeding, some issues with latch.  No headache, vision changes, CP, SOB, RUQ pain. Feels ready to go home today.    Objective:  /65   Pulse 85   Temp 97.7  F (36.5  C) (Oral)   Resp 16   SpO2 97%   Breastfeeding? Unknown     General: NAD, comfortable resting in bed  Heart: Regular rate, extremities warm and well-perfused  Lungs: Breathing comfortably, on room air  Abdomen: Soft, non-tender; fundus firm and 1 cm below umbilicus  Extremities: trace edema in BLE    Weight: Patient declined being weighed.    Labs:    Hemoglobin   Date Value Ref Range Status   2019 10.6 (L) 11.7 - 15.7 g/dL Final   2019 12.7 11.7 - 15.7 g/dL Final     Assessment/Plan:   34 year old  who is PPD#2 s/p  complicated by third degree laceration and postpartum hemorrhage. Pregnancy was notable for hypothyroidism on synthroid. Currently stable and doing well.     # Hypothyroidism   - Re-start pre-pregnancy dose synthroid 50 mcg     # Postpartum  - Continue routine post-partum cares.     - Heme: Hgb 12.7 >  QBL 1284 s/p pitocin, methergine x1, misoprostol> 10.6  - Pain: Continue oral meds   - GI: scheduled senokot, colace. PRN miralax daily, simethicone, anti-emetics.  - : Voiding spontaneously  - Baby: Doing well. Stable, breastfeeding  - Contraception: Undecided, will decide at 6 week PP visit  - Rh positive, Rubella immune, GBS negative  - PPx: Encourage ambulation    Dispo: Discharge to home today    Xuna Jimenez MD  PGY-1  Ob/Gyn    I, Angélica Gonzalez MD, personally saw and evaluated this patient.  I discussed the patient with the resident and care team, and agree with the assessment and plan of care as documented in the resident's  note of 04/06/19.  I personally reviewed vital signs, medications, lab, and exam.     Key Findings:  Meeting goals for discharge.  Abdomen non-tender, fundus firm.       PLAN:  Discharge home. She will continue stool softeners, ibuprofen prn, thyroid meds.     Angélica Gonzalez MD   Date of Service (when I saw the patient) 04/06/19   '

## 2019-04-06 NOTE — PLAN OF CARE
[id number]:  VSS.  Up ad alexandra with steady gait.  Independent with cares.  Bonding well with infant.  Breastfeeding on cue with some assist getting sleepy infant skin-to-skin and positioning to get a deep latch.  Pain managed with ibuprofen per MAR.  , Pako present and supportive.  Will continue with postpartum cares and education per plan of care.

## 2019-04-06 NOTE — PLAN OF CARE
Data: Vital signs within normal limits. Postpartum checks within normal limits - see flow record. Patient eating and drinking normally. Patient able to empty bladder independently and is up ambulating. No apparent signs of infection. Laceration well approximated . Patient performing self cares and is able to care for infant. Breastfeeding independently with latch verification.   Action: Patient medicated during the shift for cramping. See MAR. Patient reassessed within 1 hour after each medication and pain was improved - patient stated she was comfortable. Patient education done about  24 assessments and self cares. See flow record.  Response: Positive attachment behaviors observed with infant. Support persons Pako present.   Plan: Anticipate discharge on .

## 2019-04-06 NOTE — PLAN OF CARE
Data: Vital signs stable and postpartum assessments within normal limits. Pt is breastfeeding well on demand independently with a good latch. The nipples are much better after using the lanolin cream per pt.  Pt complain of cramping discomfort.  Action: Medicated pt with Ibuprofen and Tylenol for pain control. Checked latch. Encouraged pt to continue soaking in the tub twice a day. Review of care plan, teaching, and discharge instructions done with pt.  Infant identification with ID bands done, pt verification with signature obtained.   Response: pt states understanding and comfort with infant cares and feeding. All questions about baby care addressed. pt discharged with baby today, but plan on leaving at 1530 if baby will be able to void for urine collection.

## 2019-04-19 DIAGNOSIS — E03.8 OTHER SPECIFIED HYPOTHYROIDISM: ICD-10-CM

## 2019-04-19 RX ORDER — LEVOTHYROXINE SODIUM 75 UG/1
75 TABLET ORAL DAILY
Qty: 30 TABLET | Refills: 1 | Status: SHIPPED | OUTPATIENT
Start: 2019-04-19 | End: 2019-07-30

## 2019-04-19 NOTE — TELEPHONE ENCOUNTER
Pending Prescriptions:                       Disp   Refills    levothyroxine (SYNTHROID/LEVOTHROID) 75 M*30 tab*1            Sig: Take 1 tablet (75 mcg) by mouth daily    Last OV was 4/2. Pt delivered on 4/4. Refill sent to pharmacy.   Brianna Verdugo, RN-BSN

## 2019-05-23 ENCOUNTER — PRENATAL OFFICE VISIT (OUTPATIENT)
Dept: OBGYN | Facility: CLINIC | Age: 34
End: 2019-05-23
Payer: COMMERCIAL

## 2019-05-23 VITALS
HEIGHT: 69 IN | SYSTOLIC BLOOD PRESSURE: 117 MMHG | OXYGEN SATURATION: 97 % | HEART RATE: 85 BPM | DIASTOLIC BLOOD PRESSURE: 71 MMHG | BODY MASS INDEX: 26.81 KG/M2 | TEMPERATURE: 97.9 F | WEIGHT: 181 LBS

## 2019-05-23 DIAGNOSIS — E03.8 SUBCLINICAL HYPOTHYROIDISM: ICD-10-CM

## 2019-05-23 PROBLEM — O09.819 SUPERVISION OF PREGNANCY RESULTING FROM ASSISTED REPRODUCTIVE TECHNOLOGY: Status: RESOLVED | Noted: 2018-09-17 | Resolved: 2019-05-23

## 2019-05-23 PROCEDURE — 84439 ASSAY OF FREE THYROXINE: CPT | Performed by: OBSTETRICS & GYNECOLOGY

## 2019-05-23 PROCEDURE — 84443 ASSAY THYROID STIM HORMONE: CPT | Performed by: OBSTETRICS & GYNECOLOGY

## 2019-05-23 PROCEDURE — 99207 ZZC POST PARTUM EXAM: CPT | Performed by: OBSTETRICS & GYNECOLOGY

## 2019-05-23 PROCEDURE — 36415 COLL VENOUS BLD VENIPUNCTURE: CPT | Performed by: OBSTETRICS & GYNECOLOGY

## 2019-05-23 ASSESSMENT — MIFFLIN-ST. JEOR: SCORE: 1595.39

## 2019-05-23 NOTE — NURSING NOTE
"Chief Complaint   Patient presents with     Postpartum Care       Initial /71   Pulse 85   Temp 97.9  F (36.6  C) (Oral)   Ht 1.753 m (5' 9\")   Wt 82.1 kg (181 lb)   SpO2 97%   Breastfeeding? Yes   BMI 26.73 kg/m   Estimated body mass index is 26.73 kg/m  as calculated from the following:    Height as of this encounter: 1.753 m (5' 9\").    Weight as of this encounter: 82.1 kg (181 lb).  BP completed using cuff size: regular    Questioned patient about current smoking habits.  Pt. has never smoked.          The following HM Due: NONE      The following patient reported/Care Every where data was sent to:  P ABSTRACT QUALITY INITIATIVES [[id number]]  n/a      patient has appointment for today              "

## 2019-05-23 NOTE — PROGRESS NOTES
"Lolis is here for a 6-week postpartum checkup.    She had a  of a viable girl, weight 7 pounds 3 oz., with no complications.  Since delivery, she has been breast feeding.  She has no signs of infection, bleeding or other complications.  She is not pregnant.  We discussed contraception and she has chosen none.    Mood is good.  Today's Depression Rating was   PHQ-9 SCORE 2018   PHQ-9 Total Score 0       EXAM:  Vitals:    19 1000   BP: 117/71   Pulse: 85   Temp: 97.9  F (36.6  C)   TempSrc: Oral   SpO2: 97%   Weight: 82.1 kg (181 lb)   Height: 1.753 m (5' 9\")     HEENT: grossly normal.  NECK: no lymphadenopathy or thyroidomegaly.  LUNGS: CTA X 2, no rales or crackles.  BREASTS: symmetric, no masses or discharge  BACK: No spinal or CVA tenderness.  HEART: RRR without murmurs clicks or gallops.  ABDOMEN: soft, non tender, good bowel sounds, without masses rebound, guarding or tenderness.      PELVIC:    External genitalia: normal without lesion, repair well healed.                            Vagina: normal mucosa and rugae, no discharge.  Cervix: multiparous, well healed, without lesion.  Uterus: non pregnant in size, firm , mobile, no lesions.  Adnexa: non tender, without masses    EXTREMITIES: Warm to touch, good pulses, no ankle edema or calf tenderness.  NEUROLOGIC: grossly normal.    ASSESSMENT:   Normal 6-week postpartum exam after .    PLAN:  Pap smear Due 2020 and none for contraception.  Check TSH today.    "

## 2019-05-24 LAB
T4 FREE SERPL-MCNC: 1.29 NG/DL (ref 0.76–1.46)
TSH SERPL DL<=0.005 MIU/L-ACNC: 0.3 MU/L (ref 0.4–4)

## 2019-05-28 DIAGNOSIS — E03.8 SUBCLINICAL HYPOTHYROIDISM: Primary | ICD-10-CM

## 2019-07-22 DIAGNOSIS — E03.8 SUBCLINICAL HYPOTHYROIDISM: ICD-10-CM

## 2019-07-22 NOTE — TELEPHONE ENCOUNTER
Pending Prescriptions:                       Disp   Refills    levothyroxine (SYNTHROID/LEVOTHROID) 25 M*30 tab*             Sig: Take 1 tablet (25 mcg) by mouth daily    Last OV was 5/23/19. Per [employer] message on 5/28/19, pt to come in prior to next refill to recheck TSH. Pt has lab only appt on 7/24. Will await results before sending refill to make sure correct dose.   Brianna Verdugo RN-BSN

## 2019-07-26 DIAGNOSIS — E03.8 SUBCLINICAL HYPOTHYROIDISM: ICD-10-CM

## 2019-07-26 LAB
T4 FREE SERPL-MCNC: 0.7 NG/DL (ref 0.76–1.46)
TSH SERPL DL<=0.005 MIU/L-ACNC: 5.21 MU/L (ref 0.4–4)

## 2019-07-26 PROCEDURE — 84439 ASSAY OF FREE THYROXINE: CPT | Performed by: OBSTETRICS & GYNECOLOGY

## 2019-07-26 PROCEDURE — 84443 ASSAY THYROID STIM HORMONE: CPT | Performed by: OBSTETRICS & GYNECOLOGY

## 2019-07-26 PROCEDURE — 36415 COLL VENOUS BLD VENIPUNCTURE: CPT | Performed by: OBSTETRICS & GYNECOLOGY

## 2019-07-29 NOTE — TELEPHONE ENCOUNTER
Patient's lab results are back.   TSH   Date Value Ref Range Status   07/26/2019 5.21 (H) 0.40 - 4.00 mU/L Final     T4 Free   Date Value Ref Range Status   07/26/2019 0.70 (L) 0.76 - 1.46 ng/dL Final     Pt is currently taking 25mcg levothyroxine. Do you want to change the dose? Thanks.   Brianna Verdugo, RN-BSN

## 2019-07-30 DIAGNOSIS — E03.8 SUBCLINICAL HYPOTHYROIDISM: ICD-10-CM

## 2019-07-30 RX ORDER — LEVOTHYROXINE SODIUM 50 UG/1
50 TABLET ORAL DAILY
Qty: 30 TABLET | Refills: 1 | Status: SHIPPED | OUTPATIENT
Start: 2019-07-30 | End: 2019-09-26

## 2019-07-30 RX ORDER — LEVOTHYROXINE SODIUM 25 UG/1
25 TABLET ORAL DAILY
Qty: 30 TABLET | OUTPATIENT
Start: 2019-07-30

## 2019-09-24 DIAGNOSIS — E03.8 SUBCLINICAL HYPOTHYROIDISM: ICD-10-CM

## 2019-09-24 PROCEDURE — 84439 ASSAY OF FREE THYROXINE: CPT | Performed by: OBSTETRICS & GYNECOLOGY

## 2019-09-24 PROCEDURE — 36415 COLL VENOUS BLD VENIPUNCTURE: CPT | Performed by: OBSTETRICS & GYNECOLOGY

## 2019-09-24 PROCEDURE — 84443 ASSAY THYROID STIM HORMONE: CPT | Performed by: OBSTETRICS & GYNECOLOGY

## 2019-09-25 LAB
T4 FREE SERPL-MCNC: 0.83 NG/DL (ref 0.76–1.46)
TSH SERPL DL<=0.005 MIU/L-ACNC: 24.83 MU/L (ref 0.4–4)

## 2019-10-10 ENCOUNTER — OFFICE VISIT (OUTPATIENT)
Dept: FAMILY MEDICINE | Facility: CLINIC | Age: 34
End: 2019-10-10
Payer: COMMERCIAL

## 2019-10-10 VITALS
WEIGHT: 172 LBS | HEART RATE: 84 BPM | BODY MASS INDEX: 25.48 KG/M2 | TEMPERATURE: 98.8 F | DIASTOLIC BLOOD PRESSURE: 58 MMHG | SYSTOLIC BLOOD PRESSURE: 106 MMHG | HEIGHT: 69 IN

## 2019-10-10 DIAGNOSIS — E03.8 SUBCLINICAL HYPOTHYROIDISM: ICD-10-CM

## 2019-10-10 PROCEDURE — 99213 OFFICE O/P EST LOW 20 MIN: CPT | Performed by: FAMILY MEDICINE

## 2019-10-10 RX ORDER — LEVOTHYROXINE SODIUM 75 UG/1
75 TABLET ORAL DAILY
Qty: 30 TABLET | Refills: 0 | Status: SHIPPED | OUTPATIENT
Start: 2019-10-10 | End: 2020-01-23

## 2019-10-10 ASSESSMENT — MIFFLIN-ST. JEOR: SCORE: 1554.57

## 2019-10-10 NOTE — PATIENT INSTRUCTIONS
Continue taking Levothyroxine as recommended and follow-up in 1 year, if not sooner.    I recommend increasing your iodine intake to help with your TSH levels.

## 2019-10-10 NOTE — PROGRESS NOTES
Subjective     Lolis Johnson is a 34 year old female who presents to clinic today for the following health issues:    HPI   Hypothyroidism Follow-up  Patient presents today with concern regarding her hypothyroidism. She reports that her TSH levels came back higher than normal following her pregnancy; baby is now 6 months old.  Patient is currently on Levothyroxine 75 mg (takes in the morning alone with food). She had subclinical hypothyroidism during fertility treatment and was treated with Levothyroxine, initially was on 25 mg, then increased to 50 mg.    She reports that in the past, she has not had issues with her thyroid levels.   Denies any symptoms of hypothyroidism.     Since last visit, patient describes the following symptoms: Weight stable, no hair loss, no skin changes, no constipation, no loose stools        How many servings of fruits and vegetables do you eat daily?  4 or more    On average, how many sweetened beverages do you drink each day (soda, juice, sweet tea, etc)?   0    How many days per week do you miss taking your medication? 0    PMHx  Patient had a previous late miscarriage with IVF.     IMMUNIZATION  Patient is up-to-date on her flu shot.         Patient Active Problem List   Diagnosis     Subclinical hypothyroidism     Spontaneous  in second trimester     Need for Tdap vaccination     Past Surgical History:   Procedure Laterality Date     SINUS SURGERY         Social History     Tobacco Use     Smoking status: Never Smoker     Smokeless tobacco: Never Used   Substance Use Topics     Alcohol use: No     Alcohol/week: 0.0 standard drinks     Comment: occ     Family History   Problem Relation Age of Onset     Breast Cancer Paternal Grandmother          BP Readings from Last 3 Encounters:   10/10/19 106/58   19 117/71   19 105/62    Wt Readings from Last 3 Encounters:   10/10/19 78 kg (172 lb)   19 82.1 kg (181 lb)   19 93.9 kg (207 lb)            Reviewed and  "updated as needed this visit by Provider       Review of Systems   ROS COMP: Constitutional, HEENT, cardiovascular, pulmonary, gi and gu systems are negative, except as otherwise noted.    This document serves as a record of the services and decisions personally performed and made by Suzi Nathan DO. It was created on her behalf by Federico Medina, a trained medical scribe. The creation of this document is based on the provider's statements to the medical scribe.  Federico Medina October 10, 2019 7:38 AM        Objective    /58 (Cuff Size: Adult Regular)   Pulse 84   Temp 98.8  F (37.1  C) (Oral)   Ht 1.753 m (5' 9\")   Wt 78 kg (172 lb)   Breastfeeding? Yes   BMI 25.40 kg/m    Body mass index is 25.4 kg/m .  Physical Exam   GENERAL: healthy, alert and no distress  EYES: Eyes grossly normal to inspection, PERRL and conjunctivae and sclerae normal  HENT: ear canals and TM's normal, nose and mouth without ulcers or lesions  NECK: no adenopathy, no asymmetry, masses, or scars and thyroid normal to palpation  RESP: lungs clear to auscultation - no rales, rhonchi or wheezes  CV: regular rate and rhythm, normal S1 S2, no S3 or S4, no murmur, click or rub, no peripheral edema and peripheral pulses strong  MS: no gross musculoskeletal defects noted, no edema  NEURO: Normal strength and tone, mentation intact and speech normal  PSYCH: mentation appears normal, affect normal/bright    Diagnostic Test Results:  Labs reviewed in Epic        Assessment & Plan       ICD-10-CM    1. Subclinical hypothyroidism E03.9 levothyroxine (SYNTHROID/LEVOTHROID) 75 MCG tablet     Patient presents today for a medication recheck regarding her hypothyroidism. She reports that her TSH levels are higher than normal following recent pregnancy. Patient is currently on Levothyroxine 75 mg. Patient was diagnosed with subclinical hypothyroidism during fertility treatment and was treated with Levothyroxine. Initially she was on 25 mg, which then " "increased to 50 mg. She denies any symptoms of hypothyroidism, and I found nothing abnormal during her examination. I have renewed her prescription of Levothyroxine (75 mg) and advised her to schedule a follow-up lab in six weeks. Patient is to return to the clinic if she experiences any new or worsening symptoms.        BMI:   Estimated body mass index is 25.4 kg/m  as calculated from the following:    Height as of this encounter: 1.753 m (5' 9\").    Weight as of this encounter: 78 kg (172 lb).   Weight management plan: Discussed healthy diet and exercise guidelines        Patient Instructions   Continue taking Levothyroxine as recommended and follow-up in 1 year, if not sooner.    I recommend increasing your iodine intake to help with your TSH levels.       Return in about 6 weeks (around 11/21/2019) for Lab Work, Follow-up.      The information in this document, created by the medical scribe, Federico Medina, for me, accurately reflects the services I personally performed and the decisions made by me. I have reviewed and approved this document for accuracy prior to leaving the patient care area.    Suzi Nathan, DO  [facility]      "

## 2019-11-11 ENCOUNTER — DOCUMENTATION ONLY (OUTPATIENT)
Dept: LAB | Facility: CLINIC | Age: 34
End: 2019-11-11

## 2019-11-11 DIAGNOSIS — E03.8 SUBCLINICAL HYPOTHYROIDISM: Primary | ICD-10-CM

## 2019-11-11 NOTE — PROGRESS NOTES
Patient has lab appointment to recheck thyroid levels following a medication change after last visit.  An order for a TSHR has been pended for approval.  Please sign orders or change as needed.      Thanks,   [facility]

## 2019-11-21 DIAGNOSIS — E03.8 SUBCLINICAL HYPOTHYROIDISM: ICD-10-CM

## 2019-11-21 PROCEDURE — 84443 ASSAY THYROID STIM HORMONE: CPT | Performed by: FAMILY MEDICINE

## 2019-11-21 PROCEDURE — 36415 COLL VENOUS BLD VENIPUNCTURE: CPT | Performed by: FAMILY MEDICINE

## 2019-11-21 PROCEDURE — 84439 ASSAY OF FREE THYROXINE: CPT | Performed by: FAMILY MEDICINE

## 2019-11-22 LAB
T4 FREE SERPL-MCNC: 1.1 NG/DL (ref 0.76–1.46)
TSH SERPL DL<=0.005 MIU/L-ACNC: 8.86 MU/L (ref 0.4–4)

## 2019-11-25 ENCOUNTER — TELEPHONE (OUTPATIENT)
Dept: FAMILY MEDICINE | Facility: CLINIC | Age: 34
End: 2019-11-25

## 2019-11-25 DIAGNOSIS — E03.8 SUBCLINICAL HYPOTHYROIDISM: Primary | ICD-10-CM

## 2019-11-25 RX ORDER — LEVOTHYROXINE SODIUM 100 UG/1
100 TABLET ORAL DAILY
Qty: 60 TABLET | Refills: 0 | Status: SHIPPED | OUTPATIENT
Start: 2019-11-25 | End: 2020-01-17

## 2019-11-25 NOTE — TELEPHONE ENCOUNTER
Dunia Danielle,    Thank you for getting your labs done.  They results show your thyroid is under treated on their current dose of synthroid.  We will need to increase the synthroid from 50 Mcg to 75 Mcg.  We will need to recheck this lab test in 6-8 weeks.  Please make sure to take this medication first thing when you wake up and do not take other medications, eat food, or drink anything other than water for 30 minutes after taking this pill.   I have called a new prescription for this into the pharmacy we have on file.    Feel free to email or call if you have any questions.    Have a nice day,      Suzi Nathan D.O.

## 2020-01-15 DIAGNOSIS — E03.8 SUBCLINICAL HYPOTHYROIDISM: ICD-10-CM

## 2020-01-15 NOTE — TELEPHONE ENCOUNTER
"Requested Prescriptions   Pending Prescriptions Disp Refills     levothyroxine (SYNTHROID/LEVOTHROID) 100 MCG tablet [Pharmacy Med Name: LEVOTHYROXINE 100 MCG TABLET]  Last Written Prescription Date:  11/25/2019  Last Fill Quantity: 60 tabs,  # refills: 0   Last office visit: 10/10/2019 with prescribing provider:  Venita   Future Office Visit:   30 tablet 1     Sig: TAKE 1 TABLET BY MOUTH EVERY DAY       Thyroid Protocol Failed - 1/15/2020  2:02 AM        Failed - Normal TSH on file in past 12 months     Recent Labs   Lab Test 11/21/19  1737   TSH 8.86*              Passed - Patient is 12 years or older        Passed - Recent (12 mo) or future (30 days) visit within the authorizing provider's specialty     Patient has had an office visit with the authorizing provider or a provider within the authorizing providers department within the previous 12 mos or has a future within next 30 days. See \"Patient Info\" tab in inbasket, or \"Choose Columns\" in Meds & Orders section of the refill encounter.              Passed - Medication is active on med list        Passed - No active pregnancy on record     If patient is pregnant or has had a positive pregnancy test, please check TSH.          Passed - No positive pregnancy test in past 12 months     If patient is pregnant or has had a positive pregnancy test, please check TSH.           "

## 2020-01-16 NOTE — TELEPHONE ENCOUNTER
Routing refill request to provider for review/approval because:  Labs out of range:  TSH  Patient needs to be seen because:   We will need to recheck this lab test in 6-8 weeks. Patient did not follow up.     Lucrecia Lu, RN, BSN, PHN  [facility]: [place]

## 2020-01-17 RX ORDER — LEVOTHYROXINE SODIUM 100 UG/1
TABLET ORAL
Qty: 15 TABLET | Refills: 0 | Status: SHIPPED | OUTPATIENT
Start: 2020-01-17 | End: 2020-01-23

## 2020-01-17 NOTE — TELEPHONE ENCOUNTER
Please let this patient know she needs an office visit and labs I have given her 2 weeks supply    Suzi Nathan DO

## 2020-01-21 DIAGNOSIS — E03.8 SUBCLINICAL HYPOTHYROIDISM: ICD-10-CM

## 2020-01-21 PROCEDURE — 84443 ASSAY THYROID STIM HORMONE: CPT | Performed by: FAMILY MEDICINE

## 2020-01-21 PROCEDURE — 36415 COLL VENOUS BLD VENIPUNCTURE: CPT | Performed by: FAMILY MEDICINE

## 2020-01-22 LAB — TSH SERPL DL<=0.005 MIU/L-ACNC: 2.29 MU/L (ref 0.4–4)

## 2020-01-23 ENCOUNTER — TELEPHONE (OUTPATIENT)
Dept: FAMILY MEDICINE | Facility: CLINIC | Age: 35
End: 2020-01-23

## 2020-01-23 DIAGNOSIS — E03.8 SUBCLINICAL HYPOTHYROIDISM: ICD-10-CM

## 2020-01-23 RX ORDER — LEVOTHYROXINE SODIUM 100 UG/1
100 TABLET ORAL DAILY
Qty: 90 TABLET | Refills: 3 | Status: SHIPPED | OUTPATIENT
Start: 2020-01-23 | End: 2021-01-15

## 2020-01-23 NOTE — TELEPHONE ENCOUNTER
Dunia Danielle,    Thank you for getting your labs rechecked.  Your labs were normal.   I have called in a year supply of the current thyroid medication dose.  Feel free to email or call if you have any questions.    Have a nice day.    Suzi Nathan D.O.

## 2020-02-03 ENCOUNTER — TRANSFERRED RECORDS (OUTPATIENT)
Dept: HEALTH INFORMATION MANAGEMENT | Facility: CLINIC | Age: 35
End: 2020-02-03

## 2020-10-20 ENCOUNTER — OFFICE VISIT (OUTPATIENT)
Dept: OBGYN | Facility: CLINIC | Age: 35
End: 2020-10-20
Payer: COMMERCIAL

## 2020-10-20 VITALS
HEART RATE: 111 BPM | HEIGHT: 69 IN | DIASTOLIC BLOOD PRESSURE: 69 MMHG | BODY MASS INDEX: 22.96 KG/M2 | SYSTOLIC BLOOD PRESSURE: 114 MMHG | WEIGHT: 155 LBS

## 2020-10-20 DIAGNOSIS — Z01.419 ENCOUNTER FOR GYNECOLOGICAL EXAMINATION WITHOUT ABNORMAL FINDING: ICD-10-CM

## 2020-10-20 DIAGNOSIS — E03.8 OTHER SPECIFIED HYPOTHYROIDISM: Primary | ICD-10-CM

## 2020-10-20 DIAGNOSIS — R10.32 LLQ ABDOMINAL PAIN: ICD-10-CM

## 2020-10-20 PROCEDURE — 84443 ASSAY THYROID STIM HORMONE: CPT | Performed by: OBSTETRICS & GYNECOLOGY

## 2020-10-20 PROCEDURE — 36415 COLL VENOUS BLD VENIPUNCTURE: CPT | Performed by: OBSTETRICS & GYNECOLOGY

## 2020-10-20 PROCEDURE — 99395 PREV VISIT EST AGE 18-39: CPT | Performed by: OBSTETRICS & GYNECOLOGY

## 2020-10-20 ASSESSMENT — PAIN SCALES - GENERAL: PAINLEVEL: NO PAIN (0)

## 2020-10-20 ASSESSMENT — MIFFLIN-ST. JEOR: SCORE: 1472.46

## 2020-10-20 NOTE — PROGRESS NOTES
.  Lolis is a 35 year old  female who presents for annual exam.     Menses are regular q 28-30 days and irregular lasting 5 days then spotting for longer days.  Menses flow: normal.  Patient's last menstrual period was 2020.. Using condoms for contraception.  She is not currently considering pregnancy.  Besides routine health maintenance,  she would like to discuss periods and right lower pelvic pain. Periods were consistent until a few months ago. Now 21-35 days. Lots of spotting around them.   Has some pain with intercourse, mostly around the time she thinks she's ovulating.   Has some LLQ pain x months - maybe four. Feels like it's from exercise. Took a break from abs. Now stopped running. Usually exercises a lot. Even affects her when she tries to sit up.     GYNECOLOGIC HISTORY:  Menarche: [age]    Lolis is sexually active with 1 male partner(s) and is currently in monogamous relationship.  History sexually transmitted infections:No STD history  STI testing offered?  Declined  TEO exposure: No  History of abnormal Pap smear: NO - age 30- 65 PAP every 5 years recommended  Family history of breast CA: Yes (Please explain): paternal grandmother   Family history of uterine/ovarian CA: No    Family history of colon CA: No    HEALTH MAINTENANCE:  Cholesterol: (No results found for: CHOL History of abnormal lipids: No  Mammo: n/a . History of abnormal Mammo: Not applicable.  Regular Self Breast Exams: Yes  Calcium/Vitamin D intake: source:  dairy Adequate? Yes  TSH: (  TSH   Date Value Ref Range Status   2020 2.29 0.40 - 4.00 mU/L Final    )  Pap; (  Lab Results   Component Value Date    PAP NIL 2017    PAP NIL 2014    )    HISTORY:  OB History    Para Term  AB Living   2 1 1 0 1 1   SAB TAB Ectopic Multiple Live Births   1 0 0 1 1      # Outcome Date GA Lbr Jose/2nd Weight Sex Delivery Anes PTL Lv   2 Term 19 39w1d 02:15 / :36 3.26 kg (7 lb 3 oz) F Vag-Spont EPI,  Local  MARCO      Name: CATALINO LOPEZ      Apgar1: 8  Apgar5: 9   1A SAB 06/21/17 19w1d  0.278 kg (9.8 oz) M Vag-Spont Nitrous, IV REGIONAL Y FD      Complications: Prolonged PROM (>18 hours)      Name: JERONIMO LOPEZ FD      Apgar1: 0  Apgar5: 0   1B SAB 06/21/17 19w1d  0.188 kg (6.6 oz) F Vag-Spont IV REGIONAL, Nitrous Y FD      Complications: Prolonged PROM (>18 hours)      Name: SAE LOPEZ FD      Apgar1: 0  Apgar5: 0      Obstetric Comments   Lia     Past Medical History:   Diagnosis Date     Subclinical hypothyroidism 4/29/2016     Past Surgical History:   Procedure Laterality Date     SINUS SURGERY       Family History   Problem Relation Age of Onset     Breast Cancer Paternal Grandmother      Social History     Socioeconomic History     Marital status:      Spouse name: Not on file     Number of children: Not on file     Years of education: Not on file     Highest education level: Not on file   Occupational History     Not on file   Social Needs     Financial resource strain: Not on file     Food insecurity     Worry: Not on file     Inability: Not on file     Transportation needs     Medical: Not on file     Non-medical: Not on file   Tobacco Use     Smoking status: Never Smoker     Smokeless tobacco: Never Used   Substance and Sexual Activity     Alcohol use: No     Alcohol/week: 0.0 standard drinks     Comment: occ     Drug use: No     Sexual activity: Not Currently     Partners: Male     Birth control/protection: None   Lifestyle     Physical activity     Days per week: Not on file     Minutes per session: Not on file     Stress: Not on file   Relationships     Social connections     Talks on phone: Not on file     Gets together: Not on file     Attends Rastafarian service: Not on file     Active member of club or organization: Not on file     Attends meetings of clubs or organizations: Not on file     Relationship status: Not on file     Intimate partner violence     Fear of current  "or ex partner: Not on file     Emotionally abused: Not on file     Physically abused: Not on file     Forced sexual activity: Not on file   Other Topics Concern     Parent/sibling w/ CABG, MI or angioplasty before 65F 55M? No   Social History Narrative     Not on file       Current Outpatient Medications:      levothyroxine (SYNTHROID/LEVOTHROID) 100 MCG tablet, Take 1 tablet (100 mcg) by mouth daily, Disp: 90 tablet, Rfl: 3     Allergies   Allergen Reactions     Sulfa Drugs        Past medical, surgical, social and family history were reviewed and updated in EPIC.    ROS:   C:     NEGATIVE for fever, chills, change in weight  I:       NEGATIVE for worrisome rashes, moles or lesions  E:     NEGATIVE for vision changes or irritation  E/M: NEGATIVE for ear, mouth and throat problems  R:     NEGATIVE for significant cough or SOB  CV:   NEGATIVE for chest pain, palpitations or peripheral edema  GI:     NEGATIVE for nausea, abdominal pain, heartburn, or change in bowel habits  :   NEGATIVE for frequency, dysuria, hematuria, vaginal discharge, POSITIVE forirregular bleeding  M:     NEGATIVE for significant arthralgias. POSITIVE for myalgia  N:      NEGATIVE for weakness, dizziness or paresthesias  E:      NEGATIVE for temperature intolerance, skin/hair changes  P:      NEGATIVE for changes in mood or affect.    EXAM:  /69 (BP Location: Right arm, Patient Position: Sitting, Cuff Size: Adult Regular)   Pulse 111   Ht 1.753 m (5' 9\")   Wt 70.3 kg (155 lb)   LMP 09/29/2020   Breastfeeding No   BMI 22.89 kg/m     BMI: Body mass index is 22.89 kg/m .  Constitutional: healthy, alert and no distress  Head: Normocephalic. No masses, lesions, tenderness or abnormalities  Neck: Neck supple. Trachea midline. No adenopathy. Thyroid symmetric, normal size.   Cardiovascular: RRR.   Respiratory: Negative.   Breast: Breasts reveal mild symmetric fibrocystic densities, but there are no dominant, discrete, fixed or suspicious " masses found.  Gastrointestinal: Abdomen soft, non-tender, non-distended. No masses, organomegaly. TTP in left lower quadrant  :  Vulva:  No external lesions, normal female hair distribution, no inguinal adenopathy.    Urethra:  Midline, non-tender, well supported, no discharge  Vagina:  Moist, pink, no abnormal discharge, no lesions  Uterus:  Normal size, anteverted , non-tender, freely mobile  Ovaries:  No masses appreciated, non-tender, mobile  Rectal Exam: deferred  Musculoskeletal: extremities normal  Skin: no suspicious lesions or rashes  Psychiatric: Affect appropriate, cooperative,mentation appears normal.     COUNSELING:   Reviewed preventive health counseling, as reflected in patient instructions       Regular exercise       Healthy diet/nutrition   reports that she has never smoked. She has never used smokeless tobacco.    Body mass index is 22.89 kg/m .    FRAX Risk Assessment    ASSESSMENT:  35 year old female with satisfactory annual exam    (Z01.419) Encounter for gynecological examination without abnormal finding  Comment:   Plan: up to date on HM    (E03.8) Other specified hypothyroidism  (primary encounter diagnosis)  Comment:   Plan: TSH with free T4 reflex        May be cause for irregular bleeding. Consider day 3 labs if normal.    (R10.32) LLQ abdominal pain  Comment:   Plan: Exam not revealing. Consider pelvic ultrasound vs PT. Patient will think about it.We will make plan by [employer].

## 2020-10-21 ENCOUNTER — MYC MEDICAL ADVICE (OUTPATIENT)
Dept: OBGYN | Facility: CLINIC | Age: 35
End: 2020-10-21

## 2020-10-21 DIAGNOSIS — R10.32 LLQ ABDOMINAL PAIN: Primary | ICD-10-CM

## 2020-10-21 LAB — TSH SERPL DL<=0.005 MIU/L-ACNC: 2.12 MU/L (ref 0.4–4)

## 2020-10-22 NOTE — RESULT ENCOUNTER NOTE
Gilbert Danielle,   Thyroid looks good.     If you want to look into your irregular periods further, I would do labs on day three of your period: prolactin, anti-mullerian hormone, FSH, estradiol. Let me know if you want me to order them.    We could also do a pelvic ultrasound. That would look to see if there is a polyp or fibroid or other abnormality causing it.  Ultrasound would also look at your ovaries to see if that is causing her left-sided pain.    Also let me know if you want to do physical therapy and I can put a referral in.    Dr. Lu

## 2020-11-17 ENCOUNTER — THERAPY VISIT (OUTPATIENT)
Dept: PHYSICAL THERAPY | Facility: CLINIC | Age: 35
End: 2020-11-17
Attending: OBSTETRICS & GYNECOLOGY
Payer: COMMERCIAL

## 2020-11-17 DIAGNOSIS — M79.652 PAIN OF LEFT THIGH: ICD-10-CM

## 2020-11-17 DIAGNOSIS — R10.32 LLQ ABDOMINAL PAIN: ICD-10-CM

## 2020-11-17 DIAGNOSIS — M25.552 HIP PAIN, LEFT: ICD-10-CM

## 2020-11-17 PROCEDURE — 97162 PT EVAL MOD COMPLEX 30 MIN: CPT | Mod: GP | Performed by: PHYSICAL THERAPIST

## 2020-11-17 PROCEDURE — 97110 THERAPEUTIC EXERCISES: CPT | Mod: GP | Performed by: PHYSICAL THERAPIST

## 2020-11-17 NOTE — PROGRESS NOTES
[facility] Initial Evaluation  Subjective:  Patient is referred with a 3-4 month hx of L anterior hip/groin and anterior thigh pain. She started noticing sxs when she began a workout routine which included running. She has been a runner in the past and was attempting to get back into it after the birth of her child 1 1/2 years  ago. Notes pain in anterior R hip, thigh and at times the medial knee. Worse with position changes and running.      Patient Health History  Lolis Johnson being seen for lower left abdominal strain and tight left leg.     Problem began: 2020.   Problem occurred: Exercise, possibly due to post-ankur abdominal workouts to try strengthening core   Pain is reported as 6/10 on pain scale.  General health as reported by patient is good and excellent.  Pertinent medical history includes: thyroid problems.     Medical allergies: other. Other medical allergies details: Sulfa.   Surgeries include:  None and other. Other surgery history details: sinus surgey at age 14.    Current medications:  Thyroid medication.    Current occupation is [employer].   Primary job tasks include:  Computer work and prolonged sitting.                  Therapist Generated HPI Evaluation         Type of problem:  Left hip.    This is a new condition.  Condition occurred with:  Repetition/overuse and running.  Where condition occurred: in the community.  Patient reports pain:  Anterior, groin and other (thigh).  Pain is described as sharp and is intermittent.  Pain radiates to:  Thigh and knee. Pain is the same all the time.  Since onset symptoms are gradually worsening.  Associated symptoms:  Catching and loss of motion/stiffness. Symptoms are exacerbated by bending/squatting, transfers and running  and relieved by rest.      Restrictions due to condition include:  Working in normal job without restrictions.  Barriers include:  None as reported by patient.                         Objective:  System                                           Hip Evaluation  Hip PROM:            Internal Rotation: Left: 20    Right: 35  External Rotation: Left: 40    Right: 40              Hip Strength:    Flexion:   Left: 5/5   Pain:  Right: 5/5   Pain:                    Extension:  Left: 5/5  Pain:Right: 5/5    Pain:    Abduction:  Left: 4/5     Pain:Right: 5/5    Pain:  Adduction:  Left: 5/5   +   Pain:Right: 5/5   Pain:                Hip Special Testing:    Left hip positive for the following special tests:  Fadir/Labrum      Hip Palpation:  Normal         Functional Testing:          Quad:    Single leg squat:   Left:    Significant loss of control and femoral IR  Right:   Mild loss of control    Bilateral leg squat:  Normal control                  General     ROS    Assessment/Plan:    Patient is a 35 year old female with left side hip complaints.    Patient has the following significant findings with corresponding treatment plan.                Diagnosis 1:  Left hip and thigh pain  Pain -  manual therapy, self management, education and home program  Decreased ROM/flexibility - manual therapy, therapeutic exercise and home program  Impaired muscle performance - neuro re-education and home program  Decreased function - therapeutic activities and home program    Therapy Evaluation Codes:   1) History comprised of:   Personal factors that impact the plan of care:      None.    Comorbidity factors that impact the plan of care are:      None.     Medications impacting care: thyroid meds.  2) Examination of Body Systems comprised of:   Body structures and functions that impact the plan of care:      Hip.   Activity limitations that impact the plan of care are:      Dressing, Running, Sports and Squatting/kneeling.  3) Clinical presentation characteristics are:   Evolving/Changing.  4) Decision-Making    Moderate complexity using standardized patient assessment instrument and/or measureable assessment of  functional outcome.  Cumulative Therapy Evaluation is: Moderate complexity.    Previous and current functional limitations:  (See Goal Flow Sheet for this information)    Short term and Long term goals: (See Goal Flow Sheet for this information)     Communication ability:  Patient appears to be able to clearly communicate and understand verbal and written communication and follow directions correctly.  Treatment Explanation - The following has been discussed with the patient:   RX ordered/plan of care  Anticipated outcomes  Possible risks and side effects  This patient would benefit from PT intervention to resume normal activities.   Rehab potential is good.    Frequency:  1 X week, once daily  Duration:  for 4 weeks  Discharge Plan:  Achieve all LTG.  Independent in home treatment program.  Reach maximal therapeutic benefit.    Please refer to the daily flowsheet for treatment today, total treatment time and time spent performing 1:1 timed codes.

## 2020-12-01 ENCOUNTER — THERAPY VISIT (OUTPATIENT)
Dept: PHYSICAL THERAPY | Facility: CLINIC | Age: 35
End: 2020-12-01
Payer: COMMERCIAL

## 2020-12-01 DIAGNOSIS — M25.552 HIP PAIN, LEFT: ICD-10-CM

## 2020-12-01 DIAGNOSIS — M79.652 PAIN OF LEFT THIGH: ICD-10-CM

## 2020-12-01 PROCEDURE — 97110 THERAPEUTIC EXERCISES: CPT | Mod: GP | Performed by: PHYSICAL THERAPIST

## 2020-12-01 PROCEDURE — 97140 MANUAL THERAPY 1/> REGIONS: CPT | Mod: GP | Performed by: PHYSICAL THERAPIST

## 2020-12-15 ENCOUNTER — THERAPY VISIT (OUTPATIENT)
Dept: PHYSICAL THERAPY | Facility: CLINIC | Age: 35
End: 2020-12-15
Payer: COMMERCIAL

## 2020-12-15 DIAGNOSIS — M79.652 PAIN OF LEFT THIGH: ICD-10-CM

## 2020-12-15 DIAGNOSIS — M25.552 HIP PAIN, LEFT: ICD-10-CM

## 2020-12-15 PROCEDURE — 97110 THERAPEUTIC EXERCISES: CPT | Mod: GP | Performed by: PHYSICAL THERAPIST

## 2020-12-15 PROCEDURE — 97140 MANUAL THERAPY 1/> REGIONS: CPT | Mod: GP | Performed by: PHYSICAL THERAPIST

## 2020-12-15 PROCEDURE — 97035 APP MDLTY 1+ULTRASOUND EA 15: CPT | Mod: GP | Performed by: PHYSICAL THERAPIST

## 2020-12-22 ENCOUNTER — MYC MEDICAL ADVICE (OUTPATIENT)
Dept: OBGYN | Facility: CLINIC | Age: 35
End: 2020-12-22

## 2020-12-22 DIAGNOSIS — Z32.01 POSITIVE URINE PREGNANCY TEST: Primary | ICD-10-CM

## 2020-12-22 NOTE — TELEPHONE ENCOUNTER
She should have early ultrasound for dates/viability (like 8 weeks and see AO then)  and then we can order MFM for early screen    Thanks  Xuan Anders MD

## 2020-12-22 NOTE — TELEPHONE ENCOUNTER
Please see [facility] message. Patient with positive home UPT. LMP 11/21. She is wondering next steps. 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 Would you advise a early US or other testing to evaluate the heart? Please advise. Brianna Sandoval RN

## 2021-01-06 ENCOUNTER — PRENATAL OFFICE VISIT (OUTPATIENT)
Dept: NURSING | Facility: CLINIC | Age: 36
End: 2021-01-06
Payer: COMMERCIAL

## 2021-01-06 VITALS — WEIGHT: 150 LBS | HEIGHT: 69 IN | BODY MASS INDEX: 22.22 KG/M2

## 2021-01-06 DIAGNOSIS — Z23 NEED FOR TDAP VACCINATION: ICD-10-CM

## 2021-01-06 DIAGNOSIS — Z34.90 ENCOUNTER FOR SUPERVISION OF NORMAL PREGNANCY: Primary | ICD-10-CM

## 2021-01-06 PROCEDURE — 99207 PR NO CHARGE NURSE ONLY: CPT

## 2021-01-06 RX ORDER — PRENATAL VIT/IRON FUM/FOLIC AC 27MG-0.8MG
1 TABLET ORAL DAILY
COMMUNITY
End: 2021-04-22

## 2021-01-06 SDOH — SOCIAL STABILITY: SOCIAL INSECURITY: WITHIN THE LAST YEAR, HAVE YOU BEEN HUMILIATED OR EMOTIONALLY ABUSED IN OTHER WAYS BY YOUR PARTNER OR EX-PARTNER?: NO

## 2021-01-06 SDOH — SOCIAL STABILITY: SOCIAL INSECURITY
WITHIN THE LAST YEAR, HAVE YOU BEEN KICKED, HIT, SLAPPED, OR OTHERWISE PHYSICALLY HURT BY YOUR PARTNER OR EX-PARTNER?: NO

## 2021-01-06 SDOH — SOCIAL STABILITY: SOCIAL INSECURITY
WITHIN THE LAST YEAR, HAVE TO BEEN RAPED OR FORCED TO HAVE ANY KIND OF SEXUAL ACTIVITY BY YOUR PARTNER OR EX-PARTNER?: NO

## 2021-01-06 SDOH — SOCIAL STABILITY: SOCIAL NETWORK: ARE YOU MARRIED, WIDOWED, DIVORCED, SEPARATED, NEVER MARRIED, OR LIVING WITH A PARTNER?: MARRIED

## 2021-01-06 SDOH — SOCIAL STABILITY: SOCIAL NETWORK: IN A TYPICAL WEEK, HOW MANY TIMES DO YOU TALK ON THE PHONE WITH FAMILY, FRIENDS, OR NEIGHBORS?: NOT ASKED

## 2021-01-06 SDOH — HEALTH STABILITY: MENTAL HEALTH
STRESS IS WHEN SOMEONE FEELS TENSE, NERVOUS, ANXIOUS, OR CAN'T SLEEP AT NIGHT BECAUSE THEIR MIND IS TROUBLED. HOW STRESSED ARE YOU?: NOT AT ALL

## 2021-01-06 SDOH — HEALTH STABILITY: PHYSICAL HEALTH: ON AVERAGE, HOW MANY MINUTES DO YOU ENGAGE IN EXERCISE AT THIS LEVEL?: NOT ASKED

## 2021-01-06 SDOH — SOCIAL STABILITY: SOCIAL NETWORK: HOW OFTEN DO YOU ATTEND CHURCH OR RELIGIOUS SERVICES?: NOT ASKED

## 2021-01-06 SDOH — SOCIAL STABILITY: SOCIAL NETWORK: HOW OFTEN DO YOU GET TOGETHER WITH FRIENDS OR RELATIVES?: NOT ASKED

## 2021-01-06 SDOH — HEALTH STABILITY: PHYSICAL HEALTH: ON AVERAGE, HOW MANY DAYS PER WEEK DO YOU ENGAGE IN MODERATE TO STRENUOUS EXERCISE (LIKE A BRISK WALK)?: NOT ASKED

## 2021-01-06 SDOH — SOCIAL STABILITY: SOCIAL NETWORK: HOW OFTEN DO YOU ATTENT MEETINGS OF THE CLUB OR ORGANIZATION YOU BELONG TO?: NOT ASKED

## 2021-01-06 SDOH — SOCIAL STABILITY: SOCIAL NETWORK
DO YOU BELONG TO ANY CLUBS OR ORGANIZATIONS SUCH AS CHURCH GROUPS UNIONS, FRATERNAL OR ATHLETIC GROUPS, OR SCHOOL GROUPS?: NOT ASKED

## 2021-01-06 SDOH — SOCIAL STABILITY: SOCIAL INSECURITY: WITHIN THE LAST YEAR, HAVE YOU BEEN AFRAID OF YOUR PARTNER OR EX-PARTNER?: NO

## 2021-01-06 ASSESSMENT — MIFFLIN-ST. JEOR: SCORE: 1449.78

## 2021-01-06 NOTE — PROGRESS NOTES
Important Information for Provider:     New ob nurse intake by phone, third pregnancy. History of SAB 2017, triplets. Patient's previous pregnancy 4/19/2020, [facility] [facility]. This pregnancy was a surprise. She is very excited and happy.    Ultrasound scheduled for 1/18/2021 with Dr Lu to review the results after. Handouts reviewed and mailed. Discussed genetic screening. Has NOB with Dr Lu at [facility] 2/02/2021  TSH ordered with NOB labs, previous draw 10/20/20    Caffeine intake/servings daily - 0  Calcium intake/servings daily - 3  Exercise 5 times weekly - describe ; walks, bikes, weights, precautions given  Sunscreen used - Yes  Seatbelts used - Yes  Guns stored in the home - No  Self Breast Exam - Yes  Pap test up to date -  Yes  Eye exam up to date -  Yes  Dental exam up to date -  Yes  Immunizations reviewed and up to date - Yes  Abuse: Current or Past (Physical, Sexual or Emotional) - No  Do you feel safe in your environment - Yes  Do you cope well with stress - Yes  Do you suffer from insomnia - No      Prenatal OB Questionnaire  Patient supplied answers from flow sheet for:  Prenatal OB Questionnaire.  Past Medical History  Diabetes?: No  Hypertension : No  Heart disease, mitral valve prolapse or rheumatic fever?: No  An autoimmune disease such as lupus or rheumatoid arthritis?: No  Kidney disease or urinary tract infection?: No  Epilepsy, seizures or spells?: No  Migraine headaches?: No  A stroke or loss of function or sensation?: No  Any other neurological problems?: No  Have you ever been treated for depression?: No  Are you having problems with crying spells or loss of self-esteem?: No  Have you ever required psychiatric care?: after pregnancy loss 2017  Have you ever had hepatitis, liver disease or jaundice?: No  Have you been treated for blood clots in your veins, deep vein thrombosis, inflammation in the veins, thrombosis, phlebitis, pulmonary embolism or varicosities?: No  Do you bleed  more than other women after a cut or scratch?: No  Do you have a history of anemia?: No  Have you ever had thyroid problems or taken thyroid medication?: (!) Yes, takes levothyroxine   Do you have any endocrine problems?: No  Have you ever been in a major accident or suffered serious trauma?: No  Within the last year, has anyone hit, slapped, kicked or otherwise hurt you?: No  In the last year, has anyone forced you to have sex when you didn't want to?: No    Past Medical History 2   Have you ever received a blood transfusion?: No  Would you refuse a blood transfusion if a doctor judged it to be medically necessary?: No   If you answered Yes, would you rather die than receive a blood transfusion?: No  If you answered Yes, is this for Muslim reasons?: No  Does anyone in your home smoke?: No  Do you use tobacco products?: No  Do you drink beer, wine or hard liquor?: No  Do you use any of the following: marijuana, speed, cocaine, heroin, hallucinogens or other drugs?: No   Is your blood type Rh negative?: No  Have you ever had abnormal antibodies in your blood?: No  Have you ever had asthma?: No  Have you ever had tuberculosis?: No  Do you have any allergies to drugs or over-the-counter medications?: (!) Yes(sulfa)  Allergies: Dust Mites, Aspartame, Ethanol, Venlafaxine, Hydrochloride, Sertraline: No  Have you had any breast problems?: No  Have you ever ?: (!) Yes  Have you had any gynecological surgical procedures such as cervical conization, a LEEP procedure, laser treatment, cryosurgery of the cervix or a dilation and curettage, etc?: No  Have you ever had any other surgical procedures?: sinus surgery  Have you been hospitalized for a nonsurgical reason excluding normal delivery?: No  Have you ever had any anesthetic complications?: No  Have you ever had an abnormal pap smear?: No    Past Medical History (Continued)  Do you have a history of abnormalities of the uterus?: No  Did your mother take TEO or  any other hormones when she was pregnant with you?: No  Did it take you more than a year to become pregnant?: Yes  Have you ever been evaluated or treated for infertility?: Yes  Is there a history of medical problems in your family, which you feel may be important to this pregnancy?: No  Do you have any other problems we have not asked about which you feel may be important to this pregnancy?: No    Symptoms since last menstrual period  Do you have any of the following symptoms: abdominal pain, blood in stools or urine, chest pain, shortness of breath, coughing or vomiting up blood, your heart racing or skipping beats, nausea and vomiting, pain on urination or vaginal discharge or bleed: (!) Yes  Will the patient be 35 years old or older at the time of delivery?: No    Has the patient, baby's father or anyone in either family had:  Thalassemia (Italian, Greek, Mediterranean or  background only) and an MCV result less than 80?: No  Neural tube defect such as meningomyelocele, spina bifida or anencephaly?: No  Congenital heart defect?: No  Down's Syndrome?: No  Erlin-Sachs disease (Buddhism, Cajun, Monegasque-Marshallese)?: No  Sickle cell disease or trait ()?: No  Hemophilia or other inherited problems of blood?: No  Muscular dystrophy?: No  Cystic fibrosis?: No  C[place]'s chorea?: No  Mental retardation/autism?:  ('s uncle has mental retardation  If yes, was the person tested for fragile X?: No  Any other inherited genetic or chromosomal disorder?:(FOB tested for PGD,  balanced chromosome)   Maternal metabolic disorder (e.g Insulin-dependent diabetes, PKU)?: No  A child with birth defects not listed above?: No  Recurrent pregnancy loss or stillbirth?: triplets loss at 19 weeks 6/2017   Has the patient had any medications/street drugs/alcohol since her last menstrual period?: No  Does the patient or baby's father have any other genetic risks?: No    Infection History   Do you object to being tested for  Hepatitis B?: No  Do you object to being tested for HIV?: No  Have you ever been treated for tuberculosis?: No  Do you live with someone who has tuberculosis?: No  Have you ever been exposed to tuberculosis?: No  Do you have genital herpes?: No  Does your partner have genital herpes?: No  Have you had a viral illness since your last period?: No  Have you ever had gonorrhea, chlamydia, syphilis, venereal warts, trichomoniasis, pelvic inflammatory disease or any other sexually transmitted disease?: No  Do you know if you are a genital group B streptococcus carrier?: No  Have you had chicken pox/varicella?: (!) Yes   Have you been vaccinated against chicken Pox?: No  Have you had any other infectious diseases?: No      Allergies as of 1/6/2021:    Allergies as of 01/06/2021 - Reviewed 01/06/2021   Allergen Reaction Noted     Sulfa drugs  06/06/2013     Sulfasalazine Unknown 06/06/2013       Current medications are:  Current Outpatient Medications   Medication Sig Dispense Refill     levothyroxine (SYNTHROID/LEVOTHROID) 100 MCG tablet Take 1 tablet (100 mcg) by mouth daily 90 tablet 3         Early ultrasound screening tool:    Does patient have irregular periods?  No  Did patient use hormonal birth control in the three months prior to positive urine pregnancy test? No  Is the patient breastfeeding?  No  Is the patient 10 weeks or greater at time of education visit?  No

## 2021-01-06 NOTE — PROGRESS NOTES
Important Information for Provider:     New ob nurse intake by phone, third pregnancy. History of SAB 2017, triplets. Patient's previous pregnancy 4/19/2020, hui [facility]. This pregnancy was a surprise. She is very excited and happy.    Ultrasound scheduled for 1/18/2021 with Dr Lu to review the results after. Handouts reviewed and mailed. Discussed genetic screening. Has NOB with Dr Lu at [facility] 2/02/2021  TSH ordered with NOB labs, previous draw 10/20/20    Caffeine intake/servings daily - 0  Calcium intake/servings daily - 3  Exercise 5 times weekly - describe ; walks, bikes, weights, precautions given  Sunscreen used - Yes  Seatbelts used - Yes  Guns stored in the home - No  Self Breast Exam - Yes  Pap test up to date -  Yes  Eye exam up to date -  Yes  Dental exam up to date -  Yes  Immunizations reviewed and up to date - Yes  Abuse: Current or Past (Physical, Sexual or Emotional) - No  Do you feel safe in your environment - Yes  Do you cope well with stress - Yes  Do you suffer from insomnia - No      Prenatal OB Questionnaire  Patient supplied answers from flow sheet for:  Prenatal OB Questionnaire.  Past Medical History  Diabetes?: No  Hypertension : No  Heart disease, mitral valve prolapse or rheumatic fever?: No  An autoimmune disease such as lupus or rheumatoid arthritis?: No  Kidney disease or urinary tract infection?: No  Epilepsy, seizures or spells?: No  Migraine headaches?: No  A stroke or loss of function or sensation?: No  Any other neurological problems?: No  Have you ever been treated for depression?: No  Are you having problems with crying spells or loss of self-esteem?: No  Have you ever required psychiatric care?: after pregnancy loss 2017  Have you ever had hepatitis, liver disease or jaundice?: No  Have you been treated for blood clots in your veins, deep vein thrombosis, inflammation in the veins, thrombosis, phlebitis, pulmonary embolism or varicosities?: No  Do you bleed  more than other women after a cut or scratch?: No  Do you have a history of anemia?: No  Have you ever had thyroid problems or taken thyroid medication?: (!) Yes, takes levothyroxine   Do you have any endocrine problems?: No  Have you ever been in a major accident or suffered serious trauma?: No  Within the last year, has anyone hit, slapped, kicked or otherwise hurt you?: No  In the last year, has anyone forced you to have sex when you didn't want to?: No    Past Medical History 2   Have you ever received a blood transfusion?: No  Would you refuse a blood transfusion if a doctor judged it to be medically necessary?: No   If you answered Yes, would you rather die than receive a blood transfusion?: No  If you answered Yes, is this for Congregation reasons?: No  Does anyone in your home smoke?: No  Do you use tobacco products?: No  Do you drink beer, wine or hard liquor?: No  Do you use any of the following: marijuana, speed, cocaine, heroin, hallucinogens or other drugs?: No   Is your blood type Rh negative?: No  Have you ever had abnormal antibodies in your blood?: No  Have you ever had asthma?: No  Have you ever had tuberculosis?: No  Do you have any allergies to drugs or over-the-counter medications?: (!) Yes(sulfa)  Allergies: Dust Mites, Aspartame, Ethanol, Venlafaxine, Hydrochloride, Sertraline: No  Have you had any breast problems?: No  Have you ever ?: (!) Yes  Have you had any gynecological surgical procedures such as cervical conization, a LEEP procedure, laser treatment, cryosurgery of the cervix or a dilation and curettage, etc?: No  Have you ever had any other surgical procedures?: sinus surgery  Have you been hospitalized for a nonsurgical reason excluding normal delivery?: No  Have you ever had any anesthetic complications?: No  Have you ever had an abnormal pap smear?: No    Past Medical History (Continued)  Do you have a history of abnormalities of the uterus?: No  Did your mother take TEO or  any other hormones when she was pregnant with you?: No  Did it take you more than a year to become pregnant?: Yes  Have you ever been evaluated or treated for infertility?: Yes  Is there a history of medical problems in your family, which you feel may be important to this pregnancy?: No  Do you have any other problems we have not asked about which you feel may be important to this pregnancy?: No    Symptoms since last menstrual period  Do you have any of the following symptoms: abdominal pain, blood in stools or urine, chest pain, shortness of breath, coughing or vomiting up blood, your heart racing or skipping beats, nausea and vomiting, pain on urination or vaginal discharge or bleed: (!) Yes  Will the patient be 35 years old or older at the time of delivery?: No    Has the patient, baby's father or anyone in either family had:  Thalassemia (Italian, Greek, Mediterranean or  background only) and an MCV result less than 80?: No  Neural tube defect such as meningomyelocele, spina bifida or anencephaly?: No  Congenital heart defect?: No  Down's Syndrome?: No  Erlin-Sachs disease (Jain, Cajun, Djiboutian-Israeli)?: No  Sickle cell disease or trait ()?: No  Hemophilia or other inherited problems of blood?: No  Muscular dystrophy?: No  Cystic fibrosis?: No  Benson's chorea?: No  Mental retardation/autism?:  ('s uncle has mental retardation  If yes, was the person tested for fragile X?: No  Any other inherited genetic or chromosomal disorder?:(FOB tested for PGD,  balanced chromosome)   Maternal metabolic disorder (e.g Insulin-dependent diabetes, PKU)?: No  A child with birth defects not listed above?: No  Recurrent pregnancy loss or stillbirth?: triplets loss at 19 weeks 6/2017   Has the patient had any medications/street drugs/alcohol since her last menstrual period?: No  Does the patient or baby's father have any other genetic risks?: No    Infection History   Do you object to being tested for  Hepatitis B?: No  Do you object to being tested for HIV?: No  Have you ever been treated for tuberculosis?: No  Do you live with someone who has tuberculosis?: No  Have you ever been exposed to tuberculosis?: No  Do you have genital herpes?: No  Does your partner have genital herpes?: No  Have you had a viral illness since your last period?: No  Have you ever had gonorrhea, chlamydia, syphilis, venereal warts, trichomoniasis, pelvic inflammatory disease or any other sexually transmitted disease?: No  Do you know if you are a genital group B streptococcus carrier?: No  Have you had chicken pox/varicella?: (!) Yes   Have you been vaccinated against chicken Pox?: No  Have you had any other infectious diseases?: No      Allergies as of 1/6/2021:    Allergies as of 01/06/2021 - Reviewed 01/06/2021   Allergen Reaction Noted     Sulfa drugs  06/06/2013     Sulfasalazine Unknown 06/06/2013       Current medications are:  Current Outpatient Medications   Medication Sig Dispense Refill     levothyroxine (SYNTHROID/LEVOTHROID) 100 MCG tablet Take 1 tablet (100 mcg) by mouth daily 90 tablet 3     Prenatal Vit-Fe Fumarate-FA (PRENATAL MULTIVITAMIN W/IRON) 27-0.8 MG tablet Take 1 tablet by mouth daily           Early ultrasound screening tool:    Does patient have irregular periods?  No  Did patient use hormonal birth control in the three months prior to positive urine pregnancy test? No  Is the patient breastfeeding?  No  Is the patient 10 weeks or greater at time of education visit?  No

## 2021-01-13 DIAGNOSIS — E03.8 SUBCLINICAL HYPOTHYROIDISM: ICD-10-CM

## 2021-01-15 RX ORDER — LEVOTHYROXINE SODIUM 100 UG/1
TABLET ORAL
Qty: 90 TABLET | Refills: 1 | Status: SHIPPED | OUTPATIENT
Start: 2021-01-15 | End: 2021-07-13

## 2021-01-18 ENCOUNTER — ANCILLARY PROCEDURE (OUTPATIENT)
Dept: ULTRASOUND IMAGING | Facility: CLINIC | Age: 36
End: 2021-01-18
Attending: OBSTETRICS & GYNECOLOGY
Payer: COMMERCIAL

## 2021-01-18 ENCOUNTER — OFFICE VISIT (OUTPATIENT)
Dept: OBGYN | Facility: CLINIC | Age: 36
End: 2021-01-18
Attending: OBSTETRICS & GYNECOLOGY
Payer: COMMERCIAL

## 2021-01-18 VITALS
HEART RATE: 104 BPM | SYSTOLIC BLOOD PRESSURE: 96 MMHG | DIASTOLIC BLOOD PRESSURE: 64 MMHG | WEIGHT: 156.9 LBS | HEIGHT: 69 IN | OXYGEN SATURATION: 97 % | BODY MASS INDEX: 23.24 KG/M2

## 2021-01-18 DIAGNOSIS — Z32.01 POSITIVE URINE PREGNANCY TEST: ICD-10-CM

## 2021-01-18 DIAGNOSIS — Z82.79 FAMILY HISTORY OF AUTOSOMAL TRANSLOCATION: ICD-10-CM

## 2021-01-18 DIAGNOSIS — O09.91 PREGNANCY, SUPERVISION, HIGH-RISK, FIRST TRIMESTER: Primary | ICD-10-CM

## 2021-01-18 PROCEDURE — 99213 OFFICE O/P EST LOW 20 MIN: CPT | Performed by: OBSTETRICS & GYNECOLOGY

## 2021-01-18 PROCEDURE — 76817 TRANSVAGINAL US OBSTETRIC: CPT | Performed by: OBSTETRICS & GYNECOLOGY

## 2021-01-18 ASSESSMENT — MIFFLIN-ST. JEOR: SCORE: 1481.07

## 2021-01-19 NOTE — PROGRESS NOTES
"S:  Lolis presents for evaluation of early pregnancy.   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   Her  carries balanced translocation.   Through IVF they had a triplet pregnancy. 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   She suffered PPROM after CVS and lost all three babies.   She does not really want to go through CVS after her last experience but also doesn't want to suffer a term infant loss.     O:  Vitals:    21 1538   BP: 96/64   Pulse: 104   SpO2: 97%   Weight: 71.2 kg (156 lb 14.4 oz)   Height: 1.753 m (5' 9\")     Gen: well appearing    Obstetrical Ultrasound Report  OB U/S  < 14 Weeks -  Transvaginal   [facility]  Referring Provider: Dr. Xuan Anders  Sonographer: Jane Johnson RDMS  Indication:  Viability check      Dating (mm/dd/yyyy):   LMP: 20                 EDC:  21  GA by LMP:         8w2d     Current Scan On:  2021          EDC:  21  GA by Current Scan:        8w4d  The calculation of the gestational age by current scan was based on CRL.  Anatomy Scan:  Rene gestation.  Biometry:  CRL                       2.0 cm                  8w4d                      Yolk Sac               4.3 mm                                                   Fetal heart rate: 184 bpm  Findings: Viable IUP, ADRI = 4.1x 1.4x 0.8cm     Maternal Structures:  Cervix: The cervix appears long and closed.  Right Ovary: Wnl  Left Ovary: Wnl     Impression: Rene intrauterine pregnancy, 8 weeks 4 days, with RENETTA by today's ultrasound 2021 (consistent with the stated LMP RENETTA).  Fetal cardiac activity is seen, 184 bpm.  An area of subchorionic hemorrhage measuring 4.1 x 1.4 x 0.8 cm is identified.     Angélica oGnzalez MD       A/P:  35 year old  with   (O09.91) Pregnancy, supervision, high-risk, first trimester  (primary encounter diagnosis)  Comment:   Plan: [facility] " REFERRAL-PREGNANCY        Discussed ultrasound results and report.   Discussed subchorionic hemorrhage and precautions. Recommend light physical activity and pelvic rest.     (Z82.79) Family history of autosomal translocation  Comment:   Plan: [facility] REFERRAL-PREGNANCY  Discussed pregnancy history.   Fetal and infant losses have been associated with cardiac defects. Previously reviewed case with MFM fellow. Recommend first trimester screen and 15 week limited anatomy ultrasound followed by 18-20 week comprehensive anatomy ultrasound.   Discussed differences between CVS with her triplet pregnancy and current mo pregnancy. She is very reticent but still considering. Discussed ADRI may delay CVS.     Greater than 50% of this 20 minute appointment was spent on chart review, ultrasound review, patient visit, and charting.

## 2021-01-25 ENCOUNTER — VIRTUAL VISIT (OUTPATIENT)
Dept: MATERNAL FETAL MEDICINE | Facility: CLINIC | Age: 36
End: 2021-01-25
Attending: OBSTETRICS & GYNECOLOGY
Payer: COMMERCIAL

## 2021-01-25 DIAGNOSIS — Q95.0 BALANCED CHROMOSOMAL TRANSLOCATION: ICD-10-CM

## 2021-01-25 DIAGNOSIS — Q95.8: Primary | ICD-10-CM

## 2021-01-25 DIAGNOSIS — O26.90 PREGNANCY RELATED CONDITION, ANTEPARTUM: ICD-10-CM

## 2021-01-25 PROCEDURE — 96040 HC GENETIC COUNSELING, EACH 30 MINUTES: CPT | Mod: TEL | Performed by: GENETIC COUNSELOR, MS

## 2021-01-25 NOTE — PROGRESS NOTES
[facility]  Genetic Counseling Consult    Patient: Lolis Johnson YOB: 1985   Date of Service: 1/25/21      Lolis Johnson was evaluated via a billable telephone visit at [facility] for genetic consultation given the family history of a balanced translocation. 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 He also participated in today's consult.    The patient has been notified of the following:  This telephone visit will be conducted via a call between you and your physician/provider. We have found that certain health care needs can be provided without the need for a physical exam. This service lets us provide the care you need with a short phone conversation. If a prescription is necessary we can send it directly to your pharmacy. If lab work is needed we can place an order for that and you can then stop by our lab to have the test done at a later time.     If during the course of the call the provider feels a telephone visit is not appropriate, you will not be charged for this service.     Impression/Plan:   1.  Keith previously had an extensive genetic counseling consultation with Kathy Nugent GC. We reviewed the familial translocation in depth again today. This is the couple's third pregnancy. Their first pregnancy was conceived through IVF, with three embryos transferred. They underwent CVS, which determined that one triplet had inherited the unbalanced form of the translocation (the other two carried the balanced form). The couple chose to reduce the pregnancy to a twin pregnancy. Subsequently, it was determined that the chromosome result for the remaining twins (both male, with the balanced translocation) was representative of only one twin; the other twin was identified to be female on ultrasound. The pregnancy was lost at 19+0. The couple's second pregnancy was  also conceived through IVF. The couple underwent PGT-SR on fertilized embryos and transferred a female embryo with no evidence of a chromosome imbalance. This pregnancy was conceived naturally and is somewhat of a surprise for the couple, though very welcome. Today, we discussed screening and diagnostic testing options. The couple would like to pursue a CVS during this pregnancy. They disclosed that they would choose not to continue a pregnancy with a imbalanced rearrangement and would like this diagnostic information as soon as possible.    2. Maternal serum AFP (single marker screen) is recommended after 15 weeks to screen for open neural tube defects. A quad screen should not be performed.    Pregnancy History:   /Parity:    Age at Delivery: 36 year old  RENETTA: 2021, by Last Menstrual Period  Gestational Age: 9w2d  No significant complications or exposures were reported in the current pregnancy.    Medical History:   Lolis reyes reported medical history is not expected to impact pregnancy management or risks to fetal development.       Family History:   A three-generation pedigree was previously obtained by Kathy Nugent in 2015. I discussed the family history with the couple today to obtain any significant updates. They report no new deaths or diagnoses. Of note, Lolis's two sisters have each had a child since , a girl for her sister who already had a son, and a boy for her other sister. Both of these children are reported to be alive and well.     Otherwise, the reported family history is negative for multiple miscarriages, stillbirths, birth defects, intellectual disability, known genetic conditions, and consanguinity.       Carrier Screening:   The patient reports that she and the father of the pregnancy have  ancestry:    Cystic fibrosis is an autosomal recessive genetic condition that occurs with increased frequency in individuals of  ancestry and carrier screening for this  "condition is available.  In addition,  screening in the [facility] includes cystic fibrosis.      Expanded carrier screening for mutations in a large panel of genes associated with autosomal recessive conditions including cystic fibrosis, spinal muscular atrophy, and others, is now available.    The patient has declined the carrier screening options reviewed today.       Risk Assessment for Chromosome Conditions:   We explained that the risk for fetal chromosome abnormalities increases with maternal age. We discussed specific features of common chromosome abnormalities, including Down syndrome, trisomy 13, trisomy 18, and sex chromosome conditions.    - At age 36 at midtrimester, the risk to have a baby with Down syndrome is 1 in 342.   - At age 36 at midtrimester, the risk to have a baby with any chromosome abnormality is 1 in  172.     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 We reviewed that he has the expected number of chromosomes and information contained within the chromosomes, but has a rearrangement within that information.     From a review of the medical literature, the following associations are reported; however, again, it is difficult to know what the exact combination of chromosome 9 and chromosome 11 deletions and duplications would yield:  11q duplications:  Prenatal and  growth restriction, facial/body asymmetry, small penis size in males, very short mandible, lowset posteriorly rotated ears, autism spectrum symptoms, cognitive delay, decreased language, pain insensitivities.    9q deletions: \"Kleefstra Syndrome\" - severe intellectual disability, hypotonia, microcephaly, seizures, large tongue size, conotruncal heart defects (in some, not all)  11q deletions:  \"Bruce Syndrome\" -  intrauterine growth restriction, cognitive delay, trigonocephaly, loweset ears, hypertelorism, thrombocytopenia, heart defects in 50%, undescended " testis in males, increased ear and sinus infections  9q duplications: characteristic facial features including small mouth and eyes, long thin feet, speech delay, cognitive delay, ADHD/autism-spectrum symptoms     Although these are theoretical possibilities, Pako reports that everyone in his family who has been born with an unbalanced form had a complex heart defect (only two chambers) that was not survivable. He and Lolis believe that there is a 0% chance to have an unbalanced rearrangement in which a child would survive. We discussed that there could be some variability in outcome, but that it is extremely difficult to predict. Lolis and Pako would choose not to continue a pregnancy if they knew that it carried an unbalanced form.        Testing Options:   We discussed the following options:   First trimester screening  First trimester ultrasound with nuchal translucency and nasal bone assessments, maternal plasma hCG, RINA-A, and AFP measurement  Screens for fetal trisomy 21, trisomy 13, and trisomy 18  Cannot screen for open neural tube defects; maternal serum AFP after 15 weeks is recommended       Non-invasive Prenatal Testing (NIPT)  Maternal plasma cell-free DNA testing; first trimester ultrasound with nuchal translucency and nasal bone assessment is recommended, when appropriate  Screens for fetal trisomy 21, trisomy 13, trisomy 18, and sex chromosome aneuploidy  Cannot screen for open neural tube defects; maternal serum AFP after 15 weeks is recommended       Chorionic villus sampling (CVS)  Invasive procedure typically performed in the first trimester by which placental villi are obtained for the purpose of chromosome analysis and/or other prenatal genetic analysis  Diagnostic results; >99% sensitivity for fetal chromosome abnormalities  Cannot test for open neural tube defects; maternal serum AFP after 15 weeks is recommended       Genetic Amniocentesis  Invasive procedure typically performed in the  second trimester by which amniotic fluid is obtained for the purpose of chromosome analysis and/or other prenatal genetic analysis  Diagnostic results; >99% sensitivity for fetal chromosome abnormalities  AFAFP measurement tests for open neural tube defects       Comprehensive (Level II) ultrasound: Detailed ultrasound performed between 18-22 weeks gestation to screen for major birth defects and markers for aneuploidy.        We reviewed the benefits and limitations of this testing.  Screening tests provide a risk assessment specific to the pregnancy for certain fetal chromosome abnormalities, but cannot definitively diagnose or exclude a fetal chromosome abnormality.  Follow-up genetic counseling and consideration of diagnostic testing is recommended with any abnormal screening result.     Diagnostic tests carry inherent risks- including risk of miscarriage- that require careful consideration.  These tests can detect fetal chromosome abnormalities with greater than 99% certainty.  Results can be compromised by maternal cell contamination or mosaicism, and are limited by the resolution of cytogenetic G-banding technology.  There is no screening nor diagnostic test that can detect all forms of birth defects or mental disability.    Keith would like to move forward with a CVS. This has been scheduled for 2/3/21. We discussed the option of a chromosome analysis which would identify if the pregnancy has typical chromosomes, carries the balanced rearrangement, or an unbalanced rearrangement. We also discussed the option of a micoarray analysis. The microarray would determine if the pregnancy has balanced chromosomes or unbalanced chromosomes but would not be able to distinguish between a normal karyotype and a balanced translocation carrier. We also discussed that a microarray will determine if there are very small extra or missing pieces of chromosomes and could also detect unexpected information such as a  variant of unknown significance.      Phone call contact time  Call started at 1:30  Call ended at 2:15    Rashmi Paez MS, [facility]  Licensed Genetic Counselor  [facility]  Maternal Fetal Medicine  [email or web address]  [phone]

## 2021-02-01 ENCOUNTER — PRE VISIT (OUTPATIENT)
Dept: MATERNAL FETAL MEDICINE | Facility: CLINIC | Age: 36
End: 2021-02-01

## 2021-02-02 DIAGNOSIS — Z34.90 ENCOUNTER FOR SUPERVISION OF NORMAL PREGNANCY: ICD-10-CM

## 2021-02-02 LAB
ABO + RH BLD: NORMAL
ABO + RH BLD: NORMAL
BLD GP AB SCN SERPL QL: NORMAL
BLOOD BANK CMNT PATIENT-IMP: NORMAL
ERYTHROCYTE [DISTWIDTH] IN BLOOD BY AUTOMATED COUNT: 12.9 % (ref 10–15)
HCT VFR BLD AUTO: 37.7 % (ref 35–47)
HGB BLD-MCNC: 12.2 G/DL (ref 11.7–15.7)
MCH RBC QN AUTO: 31 PG (ref 26.5–33)
MCHC RBC AUTO-ENTMCNC: 32.4 G/DL (ref 31.5–36.5)
MCV RBC AUTO: 96 FL (ref 78–100)
PLATELET # BLD AUTO: 220 10E9/L (ref 150–450)
RBC # BLD AUTO: 3.93 10E12/L (ref 3.8–5.2)
SPECIMEN EXP DATE BLD: NORMAL
WBC # BLD AUTO: 7.2 10E9/L (ref 4–11)

## 2021-02-02 PROCEDURE — 84443 ASSAY THYROID STIM HORMONE: CPT | Performed by: OBSTETRICS & GYNECOLOGY

## 2021-02-02 PROCEDURE — 36415 COLL VENOUS BLD VENIPUNCTURE: CPT | Performed by: OBSTETRICS & GYNECOLOGY

## 2021-02-02 PROCEDURE — 86850 RBC ANTIBODY SCREEN: CPT | Performed by: OBSTETRICS & GYNECOLOGY

## 2021-02-02 PROCEDURE — 86780 TREPONEMA PALLIDUM: CPT | Mod: 90 | Performed by: OBSTETRICS & GYNECOLOGY

## 2021-02-02 PROCEDURE — 86900 BLOOD TYPING SEROLOGIC ABO: CPT | Performed by: OBSTETRICS & GYNECOLOGY

## 2021-02-02 PROCEDURE — 87389 HIV-1 AG W/HIV-1&-2 AB AG IA: CPT | Performed by: OBSTETRICS & GYNECOLOGY

## 2021-02-02 PROCEDURE — 87340 HEPATITIS B SURFACE AG IA: CPT | Performed by: OBSTETRICS & GYNECOLOGY

## 2021-02-02 PROCEDURE — 87086 URINE CULTURE/COLONY COUNT: CPT | Performed by: OBSTETRICS & GYNECOLOGY

## 2021-02-02 PROCEDURE — 84439 ASSAY OF FREE THYROXINE: CPT | Performed by: OBSTETRICS & GYNECOLOGY

## 2021-02-02 PROCEDURE — 85027 COMPLETE CBC AUTOMATED: CPT | Performed by: OBSTETRICS & GYNECOLOGY

## 2021-02-02 PROCEDURE — 86901 BLOOD TYPING SEROLOGIC RH(D): CPT | Performed by: OBSTETRICS & GYNECOLOGY

## 2021-02-02 PROCEDURE — 99000 SPECIMEN HANDLING OFFICE-LAB: CPT | Performed by: OBSTETRICS & GYNECOLOGY

## 2021-02-02 PROCEDURE — 86762 RUBELLA ANTIBODY: CPT | Performed by: OBSTETRICS & GYNECOLOGY

## 2021-02-03 ENCOUNTER — OFFICE VISIT (OUTPATIENT)
Dept: MATERNAL FETAL MEDICINE | Facility: CLINIC | Age: 36
End: 2021-02-03
Attending: OBSTETRICS & GYNECOLOGY
Payer: COMMERCIAL

## 2021-02-03 ENCOUNTER — HOSPITAL ENCOUNTER (OUTPATIENT)
Dept: ULTRASOUND IMAGING | Facility: CLINIC | Age: 36
End: 2021-02-03
Attending: OBSTETRICS & GYNECOLOGY
Payer: COMMERCIAL

## 2021-02-03 DIAGNOSIS — O26.90 PREGNANCY RELATED CONDITION, ANTEPARTUM: ICD-10-CM

## 2021-02-03 DIAGNOSIS — Q95.0 BALANCED CHROMOSOMAL TRANSLOCATION: ICD-10-CM

## 2021-02-03 DIAGNOSIS — Q95.8: ICD-10-CM

## 2021-02-03 DIAGNOSIS — Q95.0 BALANCED CHROMOSOMAL TRANSLOCATION: Primary | ICD-10-CM

## 2021-02-03 LAB
BACTERIA SPEC CULT: NORMAL
HBV SURFACE AG SERPL QL IA: NONREACTIVE
HIV 1+2 AB+HIV1 P24 AG SERPL QL IA: NONREACTIVE
Lab: NORMAL
RUBV IGG SERPL IA-ACNC: 56 IU/ML
SPECIMEN SOURCE: NORMAL
T PALLIDUM AB SER QL: NONREACTIVE
T4 FREE SERPL-MCNC: 1.06 NG/DL (ref 0.76–1.46)
TSH SERPL DL<=0.005 MIU/L-ACNC: 2.08 MU/L (ref 0.4–4)

## 2021-02-03 PROCEDURE — 999N001161 HC STATISTIC MATERNAL CELL CONTAM MOLEC: Performed by: OBSTETRICS & GYNECOLOGY

## 2021-02-03 PROCEDURE — 59015 CHORION BIOPSY: CPT | Performed by: OBSTETRICS & GYNECOLOGY

## 2021-02-03 PROCEDURE — 88235 TISSUE CULTURE PLACENTA: CPT | Mod: TC | Performed by: OBSTETRICS & GYNECOLOGY

## 2021-02-03 PROCEDURE — 76801 OB US < 14 WKS SINGLE FETUS: CPT

## 2021-02-03 PROCEDURE — 250N000011 HC RX IP 250 OP 636: Performed by: OBSTETRICS & GYNECOLOGY

## 2021-02-03 PROCEDURE — 76801 OB US < 14 WKS SINGLE FETUS: CPT | Mod: 26 | Performed by: OBSTETRICS & GYNECOLOGY

## 2021-02-03 PROCEDURE — 96040 HC GENETIC COUNSELING, EACH 30 MINUTES: CPT | Performed by: GENETIC COUNSELOR, MS

## 2021-02-03 PROCEDURE — 76945 ECHO GUIDE VILLUS SAMPLING: CPT

## 2021-02-03 PROCEDURE — 81265 STR MARKERS SPECIMEN ANAL: CPT | Performed by: OBSTETRICS & GYNECOLOGY

## 2021-02-03 PROCEDURE — 36415 COLL VENOUS BLD VENIPUNCTURE: CPT

## 2021-02-03 PROCEDURE — 88267 CHROMOSOME ANALYS PLACENTA: CPT | Mod: TC | Performed by: OBSTETRICS & GYNECOLOGY

## 2021-02-03 PROCEDURE — 76945 ECHO GUIDE VILLUS SAMPLING: CPT | Mod: 26 | Performed by: OBSTETRICS & GYNECOLOGY

## 2021-02-03 RX ORDER — HEPARIN SODIUM (PORCINE) LOCK FLUSH IV SOLN 100 UNIT/ML 100 UNIT/ML
1 SOLUTION INTRAVENOUS ONCE
Status: COMPLETED | OUTPATIENT
Start: 2021-02-03 | End: 2021-02-03

## 2021-02-03 RX ADMIN — HEPARIN 1 ML: 100 SYRINGE at 09:27

## 2021-02-03 NOTE — PROGRESS NOTES
[facility]  Genetic Counseling Consult    Patient:  Lolis Johnson YOB: 1985   Date of Service:  21      Lolis Johnson was seen at the [facility] for genetic consultation for the indication of a known balanced translocation in her partner, Pako. Pako accompanied Lolis to today's visit.       Impression/Plan:   1.  Lolis had a full genetic counseling consultation over the phone on 21 to discuss her OB history and her partner's known balanced translocation. Please see that documentation for additional details. Today she has elected to pursue chorionic villus sampling and consent was obtained. The following was ordered on the prenatal sample: chromosome analysis (karyotype). Results are expected within 10-14 days. All results will be available in [employer]. We will contact her to discuss the results, and a copy will be forwarded to the referring OB provider. Lolis provided verbal permission for detailed results to be left on her voicemail. The couple DOES NOT wish to learn the sex of the fetus.    Pregnancy History:   /Parity:    Age at Delivery: 36 year old  RENETTA: 2021, by Last Menstrual Period  Gestational Age: 10w4d    No significant complications or exposures were reported in the current pregnancy.    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 Pregnancy lost at 19 weeks.  o 2019 IVF mo pregnancy with PGT-SR, full-term female, alive and well today       Risk Assessment:   We explained that the risk for fetal chromosome abnormalities increases with maternal age. We discussed specific features of common chromosome abnormalities, including Down syndrome, trisomy 13, trisomy 18, and sex chromosome conditions.       - At age 36 at midtrimester, the risk to have a baby with Down syndrome is 1 in 342.     - At age  "36 at midtrimester, the risk to have a baby with any chromosome abnormality is 1 in  172.      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 We reviewed that he has the expected number of chromosomes and information contained within the chromosomes, but has a rearrangement within that information.      From a review of the medical literature, the following associations are reported; however, again, it is difficult to know what the exact combination of chromosome 9 and chromosome 11 deletions and duplications would yield:    11q duplications:  Prenatal and  growth restriction, facial/body asymmetry, small penis size in males, very short mandible, lowset posteriorly rotated ears, autism spectrum symptoms, cognitive delay, decreased language, pain insensitivities.      9q deletions: \"Kleefstra Syndrome\" - severe mental retardation, hypotonia, microcephaly, seizures, large tongue size, conotruncal heart defects (in some, note all)    11q deletions:  \"Bruce Syndrome\" -  intrauterine growth retardation, cognitive delay, trigonocephaly, loweset ears, hypertelorism, thrombocytopenia, heart defects in 50%, undescended testis in males, increased ear and sinus infections    9q duplications: characteristic facial features including small mouth and eyes, long thin feet, speech delay, cognitive delay, ADHD/autism-spectrum symptoms      Although these are theoretical possibilities, Pako reports that everyone in his family who has been born with an unbalanced form had a complex heart defect (only two chambers) that was not survivable. He and Lolis believe that there is a 0% chance to have an unbalanced rearrangement in which a child would survive. We discussed that there could be some variability in outcome, but that it is extremely difficult to predict. Lolis and Pako would choose not to continue a pregnancy if they knew that it carried an unbalanced form.     We previously " discussed pregnancy management and testing options extensively.     Lolis has elected to pursue chorionic villus sampling today.          Testing Options:   We discussed the following options:     Genetic Amniocentesis    Invasive procedure typically performed in the second trimester by which amniotic fluid is obtained for the purpose of chromosome analysis and/or other prenatal genetic analysis    Diagnostic results; >99% sensitivity for fetal chromosome abnormalities    AFAFP measurement tests for open neural tube defects       Comprehensive (Level II) ultrasound: Detailed ultrasound performed between 18-22 weeks gestation to screen for major birth defects and markers for aneuploidy.      Chorionic villus sampling (CVS)    Invasive procedure typically performed in the first trimester by which placental villi are obtained for the purpose of chromosome analysis and/or other prenatal genetic analysis    Diagnostic results; >99% sensitivity for fetal chromosome abnormalities    Results are not guaranteed.    Cannot test for open neural tube defects; maternal serum AFP after 15 weeks is recommended    The risk of pregnancy loss association with CVS is generally estimated to be 1/300 to 1/500.     We reviewed the chorionic villus sampling consent form as well as the genetic testing consent form. All questions were answered to the patient's apparent satisfaction. The following testing was ordered on the prenatal sample:     The couple declined FISH analysis    Chromosome analysis with results likely taking 10-14 days. We discussed that if direct testing cannot be performed and culturing is required, results may take longer.     For CVS specimens with a normal female karyotype result and no microarray testing, shelter study will be performed and resulted separately.    Results:    Due to the sample being placental in origin, rather than fetal, there is a less than 1% chance the results will be discordant from the fetus. Given  a positive result this chance may be lower if ultrasound anomalies or aneuploidy markers are detected.    Final CVS results are contingent upon the maternal cell contamination results.    Results will be communicated to the patient, forwarded to the referring provider, and available in [employer].  .      Chromosome analysis (karyotype/g-bands) assesses for translocations or large structural rearrangements at the chromosome level. These results assess each chromosome and provide information regarding number and location of all chromosomes. This result will clarify if the fetus has typical chromosomes, has the balanced rearrangement, or has an unbalanced rearrangement. We discussed the option of a microarray analysis for additional information (specific break-points, copy number variants) and the couple declined.       These results will be available in [employer].  We will contact her to discuss the results, and a copy will be forwarded to the office of the referring OB provider.    We reviewed the benefits and limitations of this testing. Diagnostic tests carry inherent risks- including risk of miscarriage- that require careful consideration.  These tests can detect fetal chromosome abnormalities with greater than 99% certainty.  Results can be compromised by maternal cell contamination or mosaicism, and are limited by the resolution of cytogenetic G-banding technology.  There is no screening nor diagnostic test that can detect all forms of birth defects or mental disability.     It was a pleasure to be involved with Lolis donis. Face-to-face time of the meeting was 20 minutes.      Rashmi Paez MS, [facility]  Licensed Genetic Counselor  [facility]  Maternal Fetal Medicine  [email or web address]  [phone]

## 2021-02-16 ENCOUNTER — PRENATAL OFFICE VISIT (OUTPATIENT)
Dept: OBGYN | Facility: CLINIC | Age: 36
End: 2021-02-16
Payer: COMMERCIAL

## 2021-02-16 ENCOUNTER — TELEPHONE (OUTPATIENT)
Dept: MATERNAL FETAL MEDICINE | Facility: CLINIC | Age: 36
End: 2021-02-16

## 2021-02-16 VITALS
SYSTOLIC BLOOD PRESSURE: 110 MMHG | BODY MASS INDEX: 24.69 KG/M2 | HEART RATE: 94 BPM | WEIGHT: 167.2 LBS | TEMPERATURE: 98.5 F | DIASTOLIC BLOOD PRESSURE: 72 MMHG

## 2021-02-16 DIAGNOSIS — R89.8 ABNORMAL KARYOTYPE: Primary | ICD-10-CM

## 2021-02-16 DIAGNOSIS — Z11.59 SCREENING FOR VIRAL DISEASE: Primary | ICD-10-CM

## 2021-02-16 LAB
CHROMOSOME ENHANCED REPORT CVS: NORMAL
KARYOTYP CVS: NORMAL

## 2021-02-16 PROCEDURE — U0005 INFEC AGEN DETEC AMPLI PROBE: HCPCS | Performed by: OBSTETRICS & GYNECOLOGY

## 2021-02-16 PROCEDURE — 99214 OFFICE O/P EST MOD 30 MIN: CPT | Performed by: OBSTETRICS & GYNECOLOGY

## 2021-02-16 PROCEDURE — U0003 INFECTIOUS AGENT DETECTION BY NUCLEIC ACID (DNA OR RNA); SEVERE ACUTE RESPIRATORY SYNDROME CORONAVIRUS 2 (SARS-COV-2) (CORONAVIRUS DISEASE [COVID-19]), AMPLIFIED PROBE TECHNIQUE, MAKING USE OF HIGH THROUGHPUT TECHNOLOGIES AS DESCRIBED BY CMS-2020-01-R: HCPCS | Performed by: OBSTETRICS & GYNECOLOGY

## 2021-02-16 NOTE — TELEPHONE ENCOUNTER
February 16, 2021    I called Lolis after I was able to review Pako's brother's karyotype. This karyotype was performed in 1984, so we discussed that there could be some technical limitations in terms of resolution. His brother was reported to have the karyotype 46, XY, del(11)(q24), indicating a deletion of the distal portion of the long arm of chromosome 11. 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 If this was a true deletion in Rick's brother, without the chromosome 9 material, the portion of chromosome 11 that is missing is the same in the fetus. Pako's brother passed away at one day of age due to a complex heart defect. This increases the possibility that this pregnancy will have a similar presentation to Pako's brother. However, an exact prognosis is not able to be made.    The couple would still like to move forward with the NT ultrasound tomorrow. They may make decisions based on tomorrow's ultrasound, or they may choose to monitor for now. I also offered to meet with the couple in person tomorrow after their ultrasound if they would like.    I encouraged Lolis to continue to call with questions.    Rashmi Paez MS, [facility]  Licensed Genetic Counselor  [facility]  Maternal Fetal Medicine  [email or web address]  [phone]

## 2021-02-16 NOTE — TELEPHONE ENCOUNTER
February 16, 2021    I called Lolis to discuss the results of the chromosome analysis performed on chorionic villi.     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 The imbalance results in a loss of one copy of material from 11q23 to 11q terminus and a gain of the 9q34 region (three copies).     Lolis and Pako have requested not to learn the fetal sex, so it was not disclosed over the phone. They are aware that this information is included in the chromosome results. Maternal cell contamination studies are pending but, given the abnormal result, are expected to be negative.     We discussed that there is a very small chance for chromosome results from CVS to be discrepant from chromosome results in the fetus. The couple previously shared with me that they would likely not continue the pregnancy based on any unbalanced result.     Lolis was understandably saddened by this information and did not want to discuss next steps in detail at this time. She has an appointment with her OB provider today. I offered to give her a call later today or tomorrow. She would like a call tomorrow to come up with a plan for management.     Rashmi Paez MS, [facility]  Licensed Genetic Counselor  [facility]  Maternal Fetal Medicine  [email or web address]  [phone]

## 2021-02-16 NOTE — H&P (VIEW-ONLY)
CC/HPI:  35 year old  presents for discussion of fetal chromosomal abnormality.   CVS done 2/3 due to family history of balanced translocation in her  Pako. Multiple family members (and they themselves) have had pregnancies complicated by the unbalanced translocation. These have all been lethal to the fetus.   Lolis just received the genetic testing information immediately prior to this appointment.   Their plan has been to terminate pregnancy if they know the baby has an unbalanced translocation. She feels come confusion since no cardiac abnormality or increased NT was seen on their prior ultrasound. This has been seen on other affected fetuses. She does know her ultrasound was very early and especially cardiac issues can often not be seen until later.   She would like to see the baby again today informally on bedside ultrasound.       Review Of Systems  Skin: negative  Eyes: negative  Ears/Nose/Throat: negative  Respiratory: No shortness of breath, dyspnea on exertion, cough, or hemoptysis  Cardiovascular: negative  Gastrointestinal: negative  Genitourinary: negative  Musculoskeletal: negative  Neurologic: negative  Psychiatric: grief  Hematologic/Lymphatic/Immunologic: negative  Endocrine: negative    Past Medical History:   Diagnosis Date     Subclinical hypothyroidism 2016       Past Surgical History:   Procedure Laterality Date     SINUS SURGERY         Family History   Problem Relation Age of Onset     Breast Cancer Paternal Grandmother        Social History     Socioeconomic History     Marital status:      Spouse name: Not on file     Number of children: Not on file     Years of education: Not on file     Highest education level: Not on file   Occupational History     Not on file   Social Needs     Financial resource strain: Not on file     Food insecurity     Worry: Not on file     Inability: Not on file     Transportation needs     Medical: Not on file     Non-medical: Not on file    Tobacco Use     Smoking status: Never Smoker     Smokeless tobacco: Never Used   Substance and Sexual Activity     Alcohol use: No     Alcohol/week: 0.0 standard drinks     Comment: occ     Drug use: No     Sexual activity: Not Currently     Partners: Male     Birth control/protection: None   Lifestyle     Physical activity     Days per week: Not on file     Minutes per session: Not on file     Stress: Not at all   Relationships     Social connections     Talks on phone: Not on file     Gets together: Not on file     Attends [employer] service: Not on file     Active member of club or organization: Not on file     Attends meetings of clubs or organizations: Not on file     Relationship status:      Intimate partner violence     Fear of current or ex partner: No     Emotionally abused: No     Physically abused: No     Forced sexual activity: No   Other Topics Concern     Parent/sibling w/ CABG, MI or angioplasty before 65F 55M? No   Social History Narrative     Not on file         Current Outpatient Medications:      levothyroxine (SYNTHROID/LEVOTHROID) 100 MCG tablet, TAKE 1 TABLET BY MOUTH EVERY DAY, Disp: 90 tablet, Rfl: 1     Prenatal Vit-Fe Fumarate-FA (PRENATAL MULTIVITAMIN W/IRON) 27-0.8 MG tablet, Take 1 tablet by mouth daily, Disp: , Rfl:     Allergies   Allergen Reactions     Sulfa Drugs      Sulfasalazine Unknown       Exam:  Vitals:    02/16/21 0907   BP: 110/72   Pulse: 94   Temp: 98.5  F (36.9  C)   TempSrc: Oral   Weight: 75.8 kg (167 lb 3.2 oz)     Body mass index is 24.69 kg/m .     Exam:  Constitutional: healthy, alert, mild distress and crying  Head: Normocephalic. No masses, lesions, tenderness or abnormalities  Neck: Neck supple. No adenopathy. Thyroid symmetric, normal size,  ENT: ENT exam normal, no neck nodes or sinus tenderness  Cardiovascular: negative. RRR. No murmurs, clicks gallops or rub  Respiratory: negative. Good diaphragmatic excursion. Lungs clear  Gastrointestinal: Abdomen  soft, non-tender.  No masses, organomegaly  : Deferred  Musculoskeletal: extremities normal- no gross deformities noted, gait normal and normal muscle tone  Skin: no suspicious lesions or rashes  Neurologic: Gait normal. Sensation grossly WNL.  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   Reviewed results.   Lolis has discussed them already with Rashmi Paez GC. We reviewed the experiences in their family history and how this pregnancy could be the same or possibly somewhat different.   We discussed options for moving forward:  - Continuing the pregnancy until delivery. Resources would be made available through [facility], NICU teams and prenatal care.   - Continuing the pregnancy until further ultrasounds can look more closely at the heart and other structures.   - Pregnancy termination. We discussed pregnancy termination options before and after 15 weeks: procedures, risks, alternatives. We discussed limit of 22+6 weeks gestation for pregnancy termination through [facility].   - We reviewed the 24 hours consent mandated by the [facility], which she signed.   - Lolis thinks she wants to proceed with termination but will think it over more, discuss it with her  and think about whether they want another ultrasound or not.  - COVID test done for pre-op planning.     After further discussion with Rashmi Paez GC, and her , Lolis let me know she would like to proceed with D&C for termination of pregnancy as soon as possible.

## 2021-02-16 NOTE — TELEPHONE ENCOUNTER
"February 16, 2021    I received a message from Lolis asking for a callback to discuss the results further.     Lolis and Pako have been talking more about the results and are not whether or not they want to move forward with termination at this time. 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 There is often very limited literature surrounding specific deletions or duplications. However, 11q deletions are associated with  \"Bruce Syndrome\" characterized by  intrauterine growth retardation, cognitive delay, trigonocephaly, loweset ears, hypertelorism, thrombocytopenia, heart defects in 50%, undescended testis in males, and increased ear and sinus infections. 9q duplications are associated with characteristic facial features including small mouth and eyes, long thin feet, speech delay, cognitive delay, and ADHD/autism-spectrum symptoms. We discussed that given this fetus has both the 11q deletion and the 9q duplication, prognosis is difficult to predict. In the family, affected individuals and individuals suspected to have an unbalanced rearrangement, have presented with significant heart defects. The couple has the karyotype for Pako's affected brother, which they offered to email to me.     Edyta and Pako explained that they are feeling a lot of uncertainty about terminating or continuing the pregnancy because they do not know how this rearrangement will present in the fetus. Lolis explained that during their triplet pregnancy, the known affected fetus that was reduced had significant findings, such as a thick NT. I encouraged Lolis and Pako to take time to make this decision and that we will support the regardless of whether or not they choose to continue the pregnancy. There is no rush to make this decision. I offered to schedule the couple for an NT ultrasound, which they were amenable to. This has been scheduled for tomorrow.     We discussed that if the NT " appears thick, this could increase the possibility of cardiac defects. At that time, we could also offer a 2/3 complete ultrasound for early assessment of the anatomy around 15 weeks. If at that time the anatomy appears normal, we would follow up with a level II ultrasound at 18 weeks. Early ultrasound at 15 or 16 weeks could miss a heart defect, and there are limitations to ultrasound even at later gestations. We would also likely schedule a fetal echocardiogram around 22 weeks. We discussed that the legal limit for termination is 23+6 in the [facility] and 22+6 at the [facility]. If the couple chooses to continue the pregnancy to term, we would connect them to pediatric genetics.     Rashmi Paez MS, [facility]  Licensed Genetic Counselor  [facility]  Maternal Fetal Medicine  [email or web address]  [phone]

## 2021-02-16 NOTE — PROGRESS NOTES
CC/HPI:  35 year old  presents for discussion of fetal chromosomal abnormality.   [facility] done 2/3 due to family history of balanced translocation in her  [place]. Multiple family members (and they themselves) have had pregnancies complicated by the unbalanced translocation. These have all been lethal to the fetus.   Lolis just received the genetic testing information immediately prior to this appointment.   Their plan has been to terminate pregnancy if they know the baby has an unbalanced translocation. She feels come confusion since no cardiac abnormality or increased NT was seen on their prior ultrasound. This has been seen on other affected fetuses. She does know her ultrasound was very early and especially cardiac issues can often not be seen until later.   She would like to see the baby again today informally on bedside ultrasound.       Review Of Systems  Skin: negative  Eyes: negative  Ears/Nose/Throat: negative  Respiratory: No shortness of breath, dyspnea on exertion, cough, or hemoptysis  Cardiovascular: negative  Gastrointestinal: negative  Genitourinary: negative  Musculoskeletal: negative  Neurologic: negative  Psychiatric: grief  Hematologic/Lymphatic/Immunologic: negative  Endocrine: negative    Past Medical History:   Diagnosis Date     Subclinical hypothyroidism 2016       Past Surgical History:   Procedure Laterality Date     SINUS SURGERY         Family History   Problem Relation Age of Onset     Breast Cancer Paternal Grandmother        Social History     Socioeconomic History     Marital status:      Spouse name: Not on file     Number of children: Not on file     Years of education: Not on file     Highest education level: Not on file   Occupational History     Not on file   Social Needs     Financial resource strain: Not on file     Food insecurity     Worry: Not on file     Inability: Not on file     Transportation needs     Medical: Not on file     Non-medical: Not on file    Tobacco Use     Smoking status: Never Smoker     Smokeless tobacco: Never Used   Substance and Sexual Activity     Alcohol use: No     Alcohol/week: 0.0 standard drinks     Comment: occ     Drug use: No     Sexual activity: Not Currently     Partners: Male     Birth control/protection: None   Lifestyle     Physical activity     Days per week: Not on file     Minutes per session: Not on file     Stress: Not at all   Relationships     Social connections     Talks on phone: Not on file     Gets together: Not on file     Attends [employer] service: Not on file     Active member of club or organization: Not on file     Attends meetings of clubs or organizations: Not on file     Relationship status:      Intimate partner violence     Fear of current or ex partner: No     Emotionally abused: No     Physically abused: No     Forced sexual activity: No   Other Topics Concern     Parent/sibling w/ CABG, MI or angioplasty before 65F 55M? No   Social History Narrative     Not on file         Current Outpatient Medications:      levothyroxine (SYNTHROID/LEVOTHROID) 100 MCG tablet, TAKE 1 TABLET BY MOUTH EVERY DAY, Disp: 90 tablet, Rfl: 1     Prenatal Vit-Fe Fumarate-FA (PRENATAL MULTIVITAMIN W/IRON) 27-0.8 MG tablet, Take 1 tablet by mouth daily, Disp: , Rfl:     Allergies   Allergen Reactions     Sulfa Drugs      Sulfasalazine Unknown       Exam:  Vitals:    02/16/21 0907   BP: 110/72   Pulse: 94   Temp: 98.5  F (36.9  C)   TempSrc: Oral   Weight: 75.8 kg (167 lb 3.2 oz)     Body mass index is 24.69 kg/m .     Exam:  Constitutional: healthy, alert, mild distress and crying  Head: Normocephalic. No masses, lesions, tenderness or abnormalities  Neck: Neck supple. No adenopathy. Thyroid symmetric, normal size,  ENT: ENT exam normal, no neck nodes or sinus tenderness  Cardiovascular: negative. RRR. No murmurs, clicks gallops or rub  Respiratory: negative. Good diaphragmatic excursion. Lungs clear  Gastrointestinal: Abdomen  soft, non-tender.  No masses, organomegaly  : Deferred  Musculoskeletal: extremities normal- no gross deformities noted, gait normal and normal muscle tone  Skin: no suspicious lesions or rashes  Neurologic: Gait normal. Sensation grossly WNL.  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   Reviewed results.   Lolis has discussed them already with Rashmi Paez GC. We reviewed the experiences in their family history and how this pregnancy could be the same or possibly somewhat different.   We discussed options for moving forward:  - Continuing the pregnancy until delivery. Resources would be made available through [facility], NICU teams and prenatal care.   - Continuing the pregnancy until further ultrasounds can look more closely at the heart and other structures.   - Pregnancy termination. We discussed pregnancy termination options before and after 15 weeks: procedures, risks, alternatives. We discussed limit of 22+6 weeks gestation for pregnancy termination through [facility].   - We reviewed the 24 hours consent mandated by the [facility], which she signed.   - Lolis thinks she wants to proceed with termination but will think it over more, discuss it with her  and think about whether they want another ultrasound or not.  - COVID test done for pre-op planning.     After further discussion with Rashmi Paez GC, and her , Lolis let me know she would like to proceed with D&C for termination of pregnancy as soon as possible.

## 2021-02-17 ENCOUNTER — ANESTHESIA EVENT (OUTPATIENT)
Dept: SURGERY | Facility: CLINIC | Age: 36
End: 2021-02-17
Payer: COMMERCIAL

## 2021-02-17 LAB
Lab: NORMAL
Lab: NORMAL
SARS-COV-2 RNA RESP QL NAA+PROBE: NOT DETECTED
SPECIMEN SOURCE: NORMAL

## 2021-02-17 NOTE — ANESTHESIA PREPROCEDURE EVALUATION
Anesthesia Pre-Procedure Evaluation    Patient: Lolis Johnson   MRN: [id number] : 1985        Preoperative Diagnosis: Fetus with chromosomal abnormality, single or unspecified fetus [O35.1XX0]   Procedure : Procedure(s):  DILATION AND CURETTAGE, UTERUS     Past Medical History:   Diagnosis Date     Subclinical hypothyroidism 2016      Past Surgical History:   Procedure Laterality Date     SINUS SURGERY        Allergies   Allergen Reactions     Sulfa Drugs      Sulfasalazine Unknown      Social History     Tobacco Use     Smoking status: Never Smoker     Smokeless tobacco: Never Used   Substance Use Topics     Alcohol use: Not Currently     Alcohol/week: 0.0 standard drinks     Comment: occ      Wt Readings from Last 1 Encounters:   21 75.8 kg (167 lb 3.2 oz)           Physical Exam    Airway        Mallampati: I   TM distance: > 3 FB   Neck ROM: full   Mouth opening: > 3 cm    Respiratory Devices and Support         Dental  no notable dental history         Cardiovascular   cardiovascular exam normal          Pulmonary   pulmonary exam normal                OUTSIDE LABS:  CBC:   Lab Results   Component Value Date    WBC 7.2 2021    WBC 6.2 2018    HGB 12.2 2021    HGB 10.6 (L) 2019    HCT 37.7 2021    HCT 40.7 2018     2021     2019     BMP: No results found for: NA, POTASSIUM, CHLORIDE, CO2, BUN, CR, GLC  COAGS:   Lab Results   Component Value Date    PTT 26 2017    INR 1.02 2017    FIBR 638 (H) 2017     POC: No results found for: BGM, HCG, HCGS  HEPATIC: No results found for: ALBUMIN, PROTTOTAL, ALT, AST, GGT, ALKPHOS, BILITOTAL, BILIDIRECT, VERNON  OTHER:   Lab Results   Component Value Date    TSH 2.08 2021    T4 1.06 2021       Anesthesia Plan    ASA Status:  2   NPO Status:  NPO Appropriate    Anesthesia Type: MAC.     - Reason for MAC: Deep or markedly invasive procedure (G8)   Induction: Intravenous,  Propofol.   Maintenance: TIVA.        Consents    Anesthesia Plan(s) and associated risks, benefits, and realistic alternatives discussed. Questions answered and patient/representative(s) expressed understanding.     - Discussed with:  Patient         Postoperative Care       PONV prophylaxis: Ondansetron (or other 5HT-3), Dexamethasone or Solumedrol     Comments:                Mehul Marquez MD

## 2021-02-18 ENCOUNTER — ANESTHESIA (OUTPATIENT)
Dept: SURGERY | Facility: CLINIC | Age: 36
End: 2021-02-18
Payer: COMMERCIAL

## 2021-02-18 ENCOUNTER — HOSPITAL ENCOUNTER (OUTPATIENT)
Facility: CLINIC | Age: 36
Discharge: HOME | End: 2021-02-18
Attending: OBSTETRICS & GYNECOLOGY | Admitting: OBSTETRICS & GYNECOLOGY
Payer: COMMERCIAL

## 2021-02-18 VITALS
BODY MASS INDEX: 23.87 KG/M2 | SYSTOLIC BLOOD PRESSURE: 108 MMHG | HEART RATE: 71 BPM | HEIGHT: 69 IN | DIASTOLIC BLOOD PRESSURE: 66 MMHG | RESPIRATION RATE: 14 BRPM | WEIGHT: 161.16 LBS | TEMPERATURE: 97.5 F | OXYGEN SATURATION: 100 %

## 2021-02-18 LAB
ABO + RH BLD: NORMAL
ABO + RH BLD: NORMAL
BLD GP AB SCN SERPL QL: NORMAL
BLOOD BANK CMNT PATIENT-IMP: NORMAL
GLUCOSE BLDC GLUCOMTR-MCNC: 94 MG/DL (ref 70–99)
MATERNAL CELL CONTAMINATION MOL ANALYSIS: NORMAL
SPECIMEN EXP DATE BLD: NORMAL

## 2021-02-18 PROCEDURE — 250N000011 HC RX IP 250 OP 636: Performed by: NURSE ANESTHETIST, CERTIFIED REGISTERED

## 2021-02-18 PROCEDURE — 999N001017 HC STATISTIC GLUCOSE BY METER IP

## 2021-02-18 PROCEDURE — 370N000017 HC ANESTHESIA TECHNICAL FEE, PER MIN: Performed by: OBSTETRICS & GYNECOLOGY

## 2021-02-18 PROCEDURE — 258N000003 HC RX IP 258 OP 636: Performed by: NURSE ANESTHETIST, CERTIFIED REGISTERED

## 2021-02-18 PROCEDURE — 360N000075 HC SURGERY LEVEL 2, PER MIN: Performed by: OBSTETRICS & GYNECOLOGY

## 2021-02-18 PROCEDURE — 88305 TISSUE EXAM BY PATHOLOGIST: CPT | Mod: 26 | Performed by: PATHOLOGY

## 2021-02-18 PROCEDURE — 250N000009 HC RX 250: Performed by: OBSTETRICS & GYNECOLOGY

## 2021-02-18 PROCEDURE — 999N000141 HC STATISTIC PRE-PROCEDURE NURSING ASSESSMENT: Performed by: OBSTETRICS & GYNECOLOGY

## 2021-02-18 PROCEDURE — 272N000001 HC OR GENERAL SUPPLY STERILE: Performed by: OBSTETRICS & GYNECOLOGY

## 2021-02-18 PROCEDURE — 36415 COLL VENOUS BLD VENIPUNCTURE: CPT | Performed by: STUDENT IN AN ORGANIZED HEALTH CARE EDUCATION/TRAINING PROGRAM

## 2021-02-18 PROCEDURE — 88305 TISSUE EXAM BY PATHOLOGIST: CPT | Mod: TC | Performed by: OBSTETRICS & GYNECOLOGY

## 2021-02-18 PROCEDURE — 86850 RBC ANTIBODY SCREEN: CPT | Performed by: STUDENT IN AN ORGANIZED HEALTH CARE EDUCATION/TRAINING PROGRAM

## 2021-02-18 PROCEDURE — 59840 INDUCED ABORTION D&C: CPT | Mod: GC | Performed by: OBSTETRICS & GYNECOLOGY

## 2021-02-18 PROCEDURE — 76998 US GUIDE INTRAOP: CPT | Mod: 26 | Performed by: OBSTETRICS & GYNECOLOGY

## 2021-02-18 PROCEDURE — 76499 UNLISTED DX RADIOGRAPHIC PX: CPT

## 2021-02-18 PROCEDURE — 250N000013 HC RX MED GY IP 250 OP 250 PS 637: Performed by: STUDENT IN AN ORGANIZED HEALTH CARE EDUCATION/TRAINING PROGRAM

## 2021-02-18 PROCEDURE — 86900 BLOOD TYPING SEROLOGIC ABO: CPT | Performed by: STUDENT IN AN ORGANIZED HEALTH CARE EDUCATION/TRAINING PROGRAM

## 2021-02-18 PROCEDURE — 76998 US GUIDE INTRAOP: CPT

## 2021-02-18 PROCEDURE — 710N000012 HC RECOVERY PHASE 2, PER MINUTE: Performed by: OBSTETRICS & GYNECOLOGY

## 2021-02-18 PROCEDURE — 86901 BLOOD TYPING SEROLOGIC RH(D): CPT | Performed by: STUDENT IN AN ORGANIZED HEALTH CARE EDUCATION/TRAINING PROGRAM

## 2021-02-18 PROCEDURE — 999N001020 HC STATISTIC H-SEND OUTS PREP: Performed by: OBSTETRICS & GYNECOLOGY

## 2021-02-18 RX ORDER — ACETAMINOPHEN 325 MG/1
975 TABLET ORAL ONCE
Status: COMPLETED | OUTPATIENT
Start: 2021-02-18 | End: 2021-02-18

## 2021-02-18 RX ORDER — LIDOCAINE 40 MG/G
CREAM TOPICAL
Status: DISCONTINUED | OUTPATIENT
Start: 2021-02-18 | End: 2021-02-18 | Stop reason: HOSPADM

## 2021-02-18 RX ORDER — NALOXONE HYDROCHLORIDE 0.4 MG/ML
0.4 INJECTION, SOLUTION INTRAMUSCULAR; INTRAVENOUS; SUBCUTANEOUS
Status: DISCONTINUED | OUTPATIENT
Start: 2021-02-18 | End: 2021-02-18 | Stop reason: HOSPADM

## 2021-02-18 RX ORDER — DOXYCYCLINE 100 MG/10ML
100 INJECTION, POWDER, LYOPHILIZED, FOR SOLUTION INTRAVENOUS
Status: COMPLETED | OUTPATIENT
Start: 2021-02-18 | End: 2021-02-18

## 2021-02-18 RX ORDER — IBUPROFEN 600 MG/1
600 TABLET, FILM COATED ORAL EVERY 6 HOURS PRN
COMMUNITY
Start: 2021-02-18 | End: 2021-04-22

## 2021-02-18 RX ORDER — NALOXONE HYDROCHLORIDE 0.4 MG/ML
0.2 INJECTION, SOLUTION INTRAMUSCULAR; INTRAVENOUS; SUBCUTANEOUS
Status: DISCONTINUED | OUTPATIENT
Start: 2021-02-18 | End: 2021-02-18 | Stop reason: HOSPADM

## 2021-02-18 RX ORDER — SODIUM CHLORIDE, SODIUM LACTATE, POTASSIUM CHLORIDE, CALCIUM CHLORIDE 600; 310; 30; 20 MG/100ML; MG/100ML; MG/100ML; MG/100ML
INJECTION, SOLUTION INTRAVENOUS CONTINUOUS PRN
Status: DISCONTINUED | OUTPATIENT
Start: 2021-02-18 | End: 2021-02-18

## 2021-02-18 RX ORDER — KETOROLAC TROMETHAMINE 30 MG/ML
INJECTION, SOLUTION INTRAMUSCULAR; INTRAVENOUS PRN
Status: DISCONTINUED | OUTPATIENT
Start: 2021-02-18 | End: 2021-02-18

## 2021-02-18 RX ORDER — MEPERIDINE HYDROCHLORIDE 25 MG/ML
12.5 INJECTION INTRAMUSCULAR; INTRAVENOUS; SUBCUTANEOUS
Status: DISCONTINUED | OUTPATIENT
Start: 2021-02-18 | End: 2021-02-18 | Stop reason: HOSPADM

## 2021-02-18 RX ORDER — FENTANYL CITRATE 50 UG/ML
INJECTION, SOLUTION INTRAMUSCULAR; INTRAVENOUS PRN
Status: DISCONTINUED | OUTPATIENT
Start: 2021-02-18 | End: 2021-02-18

## 2021-02-18 RX ORDER — LORAZEPAM 2 MG/ML
.5-1 INJECTION INTRAMUSCULAR
Status: DISCONTINUED | OUTPATIENT
Start: 2021-02-18 | End: 2021-02-18 | Stop reason: HOSPADM

## 2021-02-18 RX ORDER — ALBUTEROL SULFATE 0.83 MG/ML
2.5 SOLUTION RESPIRATORY (INHALATION) EVERY 4 HOURS PRN
Status: DISCONTINUED | OUTPATIENT
Start: 2021-02-18 | End: 2021-02-18 | Stop reason: HOSPADM

## 2021-02-18 RX ORDER — ACETAMINOPHEN 325 MG/1
975 TABLET ORAL ONCE
Status: DISCONTINUED | OUTPATIENT
Start: 2021-02-18 | End: 2021-02-18 | Stop reason: HOSPADM

## 2021-02-18 RX ORDER — IBUPROFEN 800 MG/1
800 TABLET, FILM COATED ORAL ONCE
Status: DISCONTINUED | OUTPATIENT
Start: 2021-02-18 | End: 2021-02-18 | Stop reason: HOSPADM

## 2021-02-18 RX ORDER — DEXAMETHASONE SODIUM PHOSPHATE 4 MG/ML
INJECTION, SOLUTION INTRA-ARTICULAR; INTRALESIONAL; INTRAMUSCULAR; INTRAVENOUS; SOFT TISSUE PRN
Status: DISCONTINUED | OUTPATIENT
Start: 2021-02-18 | End: 2021-02-18

## 2021-02-18 RX ORDER — SODIUM CHLORIDE, SODIUM LACTATE, POTASSIUM CHLORIDE, CALCIUM CHLORIDE 600; 310; 30; 20 MG/100ML; MG/100ML; MG/100ML; MG/100ML
INJECTION, SOLUTION INTRAVENOUS CONTINUOUS
Status: DISCONTINUED | OUTPATIENT
Start: 2021-02-18 | End: 2021-02-18 | Stop reason: HOSPADM

## 2021-02-18 RX ORDER — ONDANSETRON 4 MG/1
4 TABLET, ORALLY DISINTEGRATING ORAL EVERY 30 MIN PRN
Status: DISCONTINUED | OUTPATIENT
Start: 2021-02-18 | End: 2021-02-18 | Stop reason: HOSPADM

## 2021-02-18 RX ORDER — ONDANSETRON 2 MG/ML
4 INJECTION INTRAMUSCULAR; INTRAVENOUS EVERY 30 MIN PRN
Status: DISCONTINUED | OUTPATIENT
Start: 2021-02-18 | End: 2021-02-18 | Stop reason: HOSPADM

## 2021-02-18 RX ORDER — PROPOFOL 10 MG/ML
INJECTION, EMULSION INTRAVENOUS CONTINUOUS PRN
Status: DISCONTINUED | OUTPATIENT
Start: 2021-02-18 | End: 2021-02-18

## 2021-02-18 RX ORDER — FENTANYL CITRATE 50 UG/ML
25-50 INJECTION, SOLUTION INTRAMUSCULAR; INTRAVENOUS
Status: DISCONTINUED | OUTPATIENT
Start: 2021-02-18 | End: 2021-02-18 | Stop reason: HOSPADM

## 2021-02-18 RX ORDER — HYDROMORPHONE HYDROCHLORIDE 1 MG/ML
.3-.5 INJECTION, SOLUTION INTRAMUSCULAR; INTRAVENOUS; SUBCUTANEOUS EVERY 10 MIN PRN
Status: DISCONTINUED | OUTPATIENT
Start: 2021-02-18 | End: 2021-02-18 | Stop reason: HOSPADM

## 2021-02-18 RX ORDER — OXYCODONE HYDROCHLORIDE 5 MG/1
5 TABLET ORAL EVERY 4 HOURS PRN
Status: DISCONTINUED | OUTPATIENT
Start: 2021-02-18 | End: 2021-02-18 | Stop reason: HOSPADM

## 2021-02-18 RX ORDER — ONDANSETRON 2 MG/ML
INJECTION INTRAMUSCULAR; INTRAVENOUS PRN
Status: DISCONTINUED | OUTPATIENT
Start: 2021-02-18 | End: 2021-02-18

## 2021-02-18 RX ORDER — DIMENHYDRINATE 50 MG/ML
25 INJECTION, SOLUTION INTRAMUSCULAR; INTRAVENOUS
Status: DISCONTINUED | OUTPATIENT
Start: 2021-02-18 | End: 2021-02-18 | Stop reason: HOSPADM

## 2021-02-18 RX ORDER — LIDOCAINE HYDROCHLORIDE 10 MG/ML
INJECTION, SOLUTION INFILTRATION; PERINEURAL PRN
Status: DISCONTINUED | OUTPATIENT
Start: 2021-02-18 | End: 2021-02-18 | Stop reason: HOSPADM

## 2021-02-18 RX ORDER — PROPOFOL 10 MG/ML
INJECTION, EMULSION INTRAVENOUS PRN
Status: DISCONTINUED | OUTPATIENT
Start: 2021-02-18 | End: 2021-02-18

## 2021-02-18 RX ORDER — KETOROLAC TROMETHAMINE 30 MG/ML
30 INJECTION, SOLUTION INTRAMUSCULAR; INTRAVENOUS EVERY 6 HOURS PRN
Status: DISCONTINUED | OUTPATIENT
Start: 2021-02-18 | End: 2021-02-18 | Stop reason: HOSPADM

## 2021-02-18 RX ADMIN — ACETAMINOPHEN 975 MG: 325 TABLET, FILM COATED ORAL at 13:18

## 2021-02-18 RX ADMIN — PROPOFOL 20 MG: 10 INJECTION, EMULSION INTRAVENOUS at 14:08

## 2021-02-18 RX ADMIN — FENTANYL CITRATE 50 MCG: 50 INJECTION, SOLUTION INTRAMUSCULAR; INTRAVENOUS at 14:04

## 2021-02-18 RX ADMIN — PROPOFOL 50 MG: 10 INJECTION, EMULSION INTRAVENOUS at 14:04

## 2021-02-18 RX ADMIN — DOXYCYCLINE 100 MG: 100 INJECTION, POWDER, LYOPHILIZED, FOR SOLUTION INTRAVENOUS at 13:56

## 2021-02-18 RX ADMIN — DEXAMETHASONE SODIUM PHOSPHATE 8 MG: 4 INJECTION, SOLUTION INTRAMUSCULAR; INTRAVENOUS at 14:09

## 2021-02-18 RX ADMIN — SODIUM CHLORIDE, POTASSIUM CHLORIDE, SODIUM LACTATE AND CALCIUM CHLORIDE: 600; 310; 30; 20 INJECTION, SOLUTION INTRAVENOUS at 13:56

## 2021-02-18 RX ADMIN — ONDANSETRON 4 MG: 2 INJECTION INTRAMUSCULAR; INTRAVENOUS at 14:27

## 2021-02-18 RX ADMIN — MIDAZOLAM 2 MG: 1 INJECTION INTRAMUSCULAR; INTRAVENOUS at 13:56

## 2021-02-18 RX ADMIN — PROPOFOL 20 MG: 10 INJECTION, EMULSION INTRAVENOUS at 14:06

## 2021-02-18 RX ADMIN — KETOROLAC TROMETHAMINE 30 MG: 30 INJECTION, SOLUTION INTRAMUSCULAR at 14:27

## 2021-02-18 RX ADMIN — PROPOFOL 150 MCG/KG/MIN: 10 INJECTION, EMULSION INTRAVENOUS at 14:04

## 2021-02-18 ASSESSMENT — MIFFLIN-ST. JEOR: SCORE: 1500.38

## 2021-02-18 NOTE — OP NOTE
Operative Note    Preoperative diagnosis:  IUP at 12w5d, fetal chromosomal anomaly  Postoperative diagnosis:  Same  Procedure:  Ultrasound guided suction dilation and curettage  Surgeon:  Christiane Lu MD  Assistant: Cayla Mckeon MD PGY-1, Heath Mayer, MS3  Anesthesia:  MAC, Paracervical block  Complications: none  Specimens:  Products of conception  Condition:  Stable to PACU    EBL:  20 mL  IVF:  600 mL, crystalloid  UOP:  Not measured    Findings:  Anteverted uterus, midline, and measuring 12 weeks.  Normal appearing cervix with os visually dilated to 0-1 cm.  No blood in the vagina.  Moderate return of tissue with passage.    Indication:  Lolis Johnson is a 35 year old  at 12w5d here for pregnancy termination. Patient has a history of infertility and pregnancy loss related to translocation in her 's family. This pregnancy was found to have an abnormal translocation karyotype on [facility] 2/3/21. After counseling regarding these findings, the patient has decided to terminate the pregnancy.  The patient was counseled on risks, benefits and alternatives to the above listed procedure. She received information in compliance with the [place] Woman's Right to Know Act at least 24 hours prior to the procedure. Informed consent was signed prior to the procedure.    Procedure:  The patient was taken to the operating room where she underwent MAC anesthesia without difficulty. She was placed in a deep dorsal lithotomy position using yellow fin stirrups. The patient was examined for the above noted findings and then prepped and draped in the usual sterile fashion. A speculum was inserted into the vagina and the findings noted above were seen. A total of 10 mL of 1% lidocaine plain was injected in the 4 o'clock and 8 o'clock positions of the cervicovaginal junction.  A tenaculum was placed on the anterior cervical lip.  Abdominal ultrasound probe was placed to visualize dilator and curette insertion into the  endometrial cavity during the entire procedure.  The endocervical canal was serially dilated to 37 Burmese using Redman dilators.  The suction device was then activated and a size 12 suction curette was placed into the cervical canal.  The curette was rotated to clear the uterus. There was return of tissue. Several more passes were made under direct visualization of ultrasound with removal of tissue with each pass. The endometrial cavity cleared until there were no visible products seen on ultrasound.  All instruments were then removed. The tenaculum was removed from the cervix. The tenaculum site was then noted to be hemostatic.  There was minimal bleeding coming from the cervical os at the end of the procedure. The patient was repositioned to the supine position. The patient tolerated the procedure well and was taken to the recovery room in stable condition.  Dr. Lu was present for the entire procedure.    Cayla Mckeon MD  ObGyn Resident, PGY1  2:44 PM 2/18/2021        Physician Attestation   I was present for the entire procedure between opening and closing.    Christiane Lu  Date of Service (when I saw the patient): 02/18/21

## 2021-02-18 NOTE — DISCHARGE INSTRUCTIONS
Same-Day Surgery   Adult Discharge Orders & Instructions     For 24 hours after surgery:  1. Get plenty of rest.  A responsible adult must stay with you for at least 24 hours after you leave the hospital.   2. Pain medication can slow your reflexes. Do not drive or use heavy equipment.  If you have weakness or tingling, don't drive or use heavy equipment until this feeling goes away.  3. Mixing alcohol and pain medication can cause dizziness and slow your breathing. It can even be fatal. Do not drink alcohol while taking pain medication.  4. Avoid strenuous or risky activities.  Ask for help when climbing stairs.   5. You may feel lightheaded.  If so, sit for a few minutes before standing.  Have someone help you get up.   6. If you have nausea (feel sick to your stomach), drink only clear liquids such as apple juice, ginger ale, broth or 7-Up.  Rest may also help.  Be sure to drink enough fluids.  Move to a regular diet as you feel able. Take pain medications with a small amount of solid food, such as toast or crackers, to avoid nausea.   7. A slight fever is normal. Call the doctor if your fever is over 100 F (37.7 C) (taken under the tongue) or lasts longer than 24 hours.  8. You may have a dry mouth, muscle aches, trouble sleeping or a sore throat.  These symptoms should go away after 24 hours.  9. Do not make important or legal decisions.   Pain Management:      1. Take pain medication (if prescribed) for pain as directed by your physician.        2. WARNING: If the pain medication you have been prescribed contains Tylenol  (acetaminophen), DO NOT take additional doses of Tylenol (acetaminophen).     Call your doctor for any of the followin.  Signs of infection (fever, growing tenderness at the surgery site, severe pain, a large amount of drainage or bleeding, foul-smelling drainage, redness, swelling).    2.  It has been over 8 to 10 hours since surgery and you are still not able to urinate (pee).    3.   Headache for over 24 hours.    4.  Numbness, tingling or weakness the day after surgery (if you had spinal anesthesia).  To contact a doctor, call _____________________________________ or:      [phone] and ask for the Resident On Call for:          __OBMARYN____________ (answered 24 hours a day)      Emergency Department:  [place]: [phone]  [place] [facility]: [phone]               Rev. 10/2014

## 2021-02-18 NOTE — ANESTHESIA POSTPROCEDURE EVALUATION
Patient: Lolis Johnson    Procedure(s):  DILATION AND CURETTAGE, UTERUS    Diagnosis:Fetus with chromosomal abnormality, single or unspecified fetus [O35.1XX0]  Diagnosis Additional Information: No value filed.    Anesthesia Type:  MAC    Note:  Disposition: Outpatient   Postop Pain Control: Uneventful            Sign Out: Well controlled pain   PONV:    Neuro/Psych: Uneventful            Sign Out: Acceptable/Baseline neuro status   Airway/Respiratory: Uneventful            Sign Out: Acceptable/Baseline resp. status   CV/Hemodynamics: Uneventful            Sign Out: Acceptable CV status   Other NRE:    DID A NON-ROUTINE EVENT OCCUR?          Last vitals:  Vitals:    02/18/21 1500 02/18/21 1515 02/18/21 1530   BP: 101/63 101/67 108/66   Pulse:  71    Resp: 12  14   Temp:   36.4  C (97.5  F)   SpO2: 100% 100% 100%       Last vitals prior to Anesthesia Care Transfer:  CRNA VITALS  2/18/2021 1402 - 2/18/2021 1502      2/18/2021             Resp Rate (observed):  11          Electronically Signed By: Mehul Marquez MD  February 18, 2021  4:08 PM

## 2021-03-03 LAB — COPATH REPORT: NORMAL

## 2021-04-16 NOTE — PLAN OF CARE
Problem: Goal Outcome Summary  Goal: Goal Outcome Summary  Outcome: No Change  Patient slept well throughout the night. Patient receiving ibuprofen for pain that is well controlled. Fundus is firm midline, u/1. Parents both grieving well throughout the night. Babies have been in the loss room throughout the night. Momentos are completed. Parents just wanting footprints and hand prints at this time. Parents unsure of disposition of babies at this time. Will continue to monitor and update provider with any changes or concerns.        [facility]

## 2021-04-19 NOTE — PROGRESS NOTES
Lolis is a 36 year old  female who presents for annual exam.     Menses are regular q 26-28 days and crampy lasting 5 days.  Menses flow: spotty, light, medium and heavy.  Patient's last menstrual period was 2021.. Using none for contraception.  She is currently considering pregnancy.  Besides routine health maintenance,  she would like to discuss D & C. Recovering well. Having a lot of conversations with her  about future fertility plans. Were considering adoption but now don't feel like they can tolerate that potential loss. Now have consult scheduled with Dr. Jackson at [facility] for possible IVF  Coping with emotional strain of pregnancy loss.   GYNECOLOGIC HISTORY:  Menarche: [age]  Age at first intercourse: 23 Number of lifetime partners: 1  Lolis is sexually active with male partner(s) and is currently in monogamous relationship with .    History sexually transmitted infections:No STD history  STI testing offered?  Declined  TEO exposure: No  History of abnormal Pap smear: NO - age 30- 65 PAP every 3 years recommended  Family history of breast CA: Yes (Please explain): paternal grandmother   Family history of uterine/ovarian CA: No    Family history of colon CA: No    HEALTH MAINTENANCE:  Cholesterol: (No results found for: CHOL History of abnormal lipids: No  Mammo: n/a . History of abnormal Mammo: Not applicable.  Regular Self Breast Exams: Yes  Calcium/Vitamin D intake: source:  dairy, green leafy veggies Adequate? Yes  TSH: (  TSH   Date Value Ref Range Status   2021 2.08 0.40 - 4.00 mU/L Final    )  Pap; (  Lab Results   Component Value Date    PAP NIL 2017    PAP NIL 2014    )    HISTORY:  OB History    Para Term  AB Living   3 1 1 0 1 1   SAB TAB Ectopic Multiple Live Births   1 0 0 1 1      # Outcome Date GA Lbr Jose/2nd Weight Sex Delivery Anes PTL Lv   3             2 Term 19 39w1d 02:15 / 01:36 3.26 kg (7 lb 3 oz) F Vag-Spont EPI, Local   MARCO      Name: CATALINO LOPEZ      Apgar1: 8  Apgar5: 9   1A SAB 06/21/17 19w1d  0.278 kg (9.8 oz) M Vag-Spont Nitrous, IV Y FD      Complications: Prolonged PROM (>18 hours)      Name: JERONIMO LOPEZ FD      Apgar1: 0  Apgar5: 0   1B SAB 06/21/17 19w1d  0.188 kg (6.6 oz) F Vag-Spont IV, Nitrous Y FD      Complications: Prolonged PROM (>18 hours)      Name: SAE LOPEZ FD      Apgar1: 0  Apgar5: 0      Obstetric Comments   Lia     Past Medical History:   Diagnosis Date     Subclinical hypothyroidism 4/29/2016     Past Surgical History:   Procedure Laterality Date     DILATION AND EVACUATION N/A 2/18/2021    Procedure: DILATION AND CURETTAGE, UTERUS;  Surgeon: Christiane Lu MD;  Location: [facility] OR     SINUS SURGERY       Family History   Problem Relation Age of Onset     Breast Cancer Paternal Grandmother      Social History     Socioeconomic History     Marital status:      Spouse name: Not on file     Number of children: Not on file     Years of education: Not on file     Highest education level: Not on file   Occupational History     Not on file   Social Needs     Financial resource strain: Not on file     Food insecurity     Worry: Not on file     Inability: Not on file     Transportation needs     Medical: Not on file     Non-medical: Not on file   Tobacco Use     Smoking status: Never Smoker     Smokeless tobacco: Never Used   Substance and Sexual Activity     Alcohol use: Not Currently     Alcohol/week: 0.0 standard drinks     Comment: occ     Drug use: No     Sexual activity: Not Currently     Partners: Male     Birth control/protection: None   Lifestyle     Physical activity     Days per week: Not on file     Minutes per session: Not on file     Stress: Not at all   Relationships     Social connections     Talks on phone: Not on file     Gets together: Not on file     Attends [employer] service: Not on file     Active member of club or organization: Not on file     Attends  "meetings of clubs or organizations: Not on file     Relationship status:      Intimate partner violence     Fear of current or ex partner: No     Emotionally abused: No     Physically abused: No     Forced sexual activity: No   Other Topics Concern     Parent/sibling w/ CABG, MI or angioplasty before 65F 55M? No   Social History Narrative     Not on file       Current Outpatient Medications:      Cetirizine HCl (ZYRTEC PO), , Disp: , Rfl:      levothyroxine (SYNTHROID/LEVOTHROID) 100 MCG tablet, TAKE 1 TABLET BY MOUTH EVERY DAY, Disp: 90 tablet, Rfl: 1     Allergies   Allergen Reactions     Sulfa Drugs      Sulfasalazine Unknown       Past medical, surgical, social and family history were reviewed and updated in EPIC.    ROS:   C:     NEGATIVE for fever, chills, change in weight  I:       NEGATIVE for worrisome rashes, moles or lesions  E:     NEGATIVE for vision changes or irritation  E/M: NEGATIVE for ear, mouth and throat problems  R:     NEGATIVE for significant cough or SOB  CV:   NEGATIVE for chest pain, palpitations or peripheral edema  GI:     NEGATIVE for nausea, abdominal pain, heartburn, or change in bowel habits  :   NEGATIVE for frequency, dysuria, hematuria, vaginal discharge, or irregular bleeding  M:     NEGATIVE for significant arthralgias or myalgia  N:      NEGATIVE for weakness, dizziness or paresthesias  E:      NEGATIVE for temperature intolerance, skin/hair changes  P:      NEGATIVE for changes in mood or affect.    EXAM:  /71 (BP Location: Left arm, Patient Position: Sitting, Cuff Size: Adult Regular)   Pulse 78   Temp 97.7  F (36.5  C) (Oral)   Ht 1.753 m (5' 9\")   Wt 69.9 kg (154 lb 3.2 oz)   LMP 04/20/2021   Breastfeeding No   BMI 22.77 kg/m     BMI: Body mass index is 22.77 kg/m .  Constitutional: healthy, alert and no distress  Head: Normocephalic. No masses, lesions, tenderness or abnormalities  Neck: Neck supple. Trachea midline. No adenopathy. Thyroid symmetric, " normal size.   Cardiovascular: RRR.   Respiratory: Negative.   Breast: Breasts reveal mild symmetric fibrocystic densities, but there are no dominant, discrete, fixed or suspicious masses found.  Gastrointestinal: Abdomen soft, non-tender, non-distended. No masses, organomegaly.  :  Vulva:  No external lesions, normal female hair distribution, no inguinal adenopathy.    Urethra:  Midline, non-tender, well supported, no discharge  Vagina:  Moist, pink, no abnormal discharge, no lesions  Uterus:  Normal size, anteverted , non-tender, freely mobile  Ovaries:  No masses appreciated, non-tender, mobile  Rectal Exam: deferred  Musculoskeletal: extremities normal  Skin: no suspicious lesions or rashes  Psychiatric: Affect appropriate, cooperative,mentation appears normal.     COUNSELING:   Reviewed preventive health counseling, as reflected in patient instructions       Regular exercise       Healthy diet/nutrition       Family planning       Folic Acid   reports that she has never smoked. She has never used smokeless tobacco.    Body mass index is 22.77 kg/m .    FRAX Risk Assessment    ASSESSMENT:  36 year old female with satisfactory annual exam  (Z01.419) Encounter for gynecological examination without abnormal finding  (primary encounter diagnosis)  Comment:   Plan: UTD on HM  S/p covid vaccine.  Planning SAMEER re-consultation for unbalanced translocation  Offered support.

## 2021-04-22 ENCOUNTER — PRENATAL OFFICE VISIT (OUTPATIENT)
Dept: OBGYN | Facility: CLINIC | Age: 36
End: 2021-04-22
Payer: COMMERCIAL

## 2021-04-22 VITALS
WEIGHT: 154.2 LBS | HEIGHT: 69 IN | DIASTOLIC BLOOD PRESSURE: 71 MMHG | HEART RATE: 78 BPM | SYSTOLIC BLOOD PRESSURE: 106 MMHG | BODY MASS INDEX: 22.84 KG/M2 | TEMPERATURE: 97.7 F

## 2021-04-22 DIAGNOSIS — Z01.419 ENCOUNTER FOR GYNECOLOGICAL EXAMINATION WITHOUT ABNORMAL FINDING: Primary | ICD-10-CM

## 2021-04-22 PROCEDURE — 99395 PREV VISIT EST AGE 18-39: CPT | Performed by: OBSTETRICS & GYNECOLOGY

## 2021-04-22 SDOH — HEALTH STABILITY: MENTAL HEALTH: HOW MANY STANDARD DRINKS CONTAINING ALCOHOL DO YOU HAVE ON A TYPICAL DAY?: 1 OR 2

## 2021-04-22 SDOH — HEALTH STABILITY: MENTAL HEALTH: HOW OFTEN DO YOU HAVE A DRINK CONTAINING ALCOHOL?: 2-4 TIMES A MONTH

## 2021-04-22 SDOH — HEALTH STABILITY: MENTAL HEALTH: HOW OFTEN DO YOU HAVE 6 OR MORE DRINKS ON ONE OCCASION?: NOT ASKED

## 2021-04-22 ASSESSMENT — ANXIETY QUESTIONNAIRES
6. BECOMING EASILY ANNOYED OR IRRITABLE: NOT AT ALL
7. FEELING AFRAID AS IF SOMETHING AWFUL MIGHT HAPPEN: NOT AT ALL
5. BEING SO RESTLESS THAT IT IS HARD TO SIT STILL: NOT AT ALL
2. NOT BEING ABLE TO STOP OR CONTROL WORRYING: NOT AT ALL
IF YOU CHECKED OFF ANY PROBLEMS ON THIS QUESTIONNAIRE, HOW DIFFICULT HAVE THESE PROBLEMS MADE IT FOR YOU TO DO YOUR WORK, TAKE CARE OF THINGS AT HOME, OR GET ALONG WITH OTHER PEOPLE: NOT DIFFICULT AT ALL
3. WORRYING TOO MUCH ABOUT DIFFERENT THINGS: NOT AT ALL
1. FEELING NERVOUS, ANXIOUS, OR ON EDGE: NOT AT ALL
GAD7 TOTAL SCORE: 0

## 2021-04-22 ASSESSMENT — PATIENT HEALTH QUESTIONNAIRE - PHQ9
5. POOR APPETITE OR OVEREATING: NOT AT ALL
SUM OF ALL RESPONSES TO PHQ QUESTIONS 1-9: 0

## 2021-04-22 ASSESSMENT — MIFFLIN-ST. JEOR: SCORE: 1463.83

## 2021-04-23 ASSESSMENT — ANXIETY QUESTIONNAIRES: GAD7 TOTAL SCORE: 0

## 2021-07-13 DIAGNOSIS — E03.8 SUBCLINICAL HYPOTHYROIDISM: ICD-10-CM

## 2021-07-13 RX ORDER — LEVOTHYROXINE SODIUM 100 UG/1
TABLET ORAL
Qty: 30 TABLET | Refills: 5 | Status: SHIPPED | OUTPATIENT
Start: 2021-07-13 | End: 2022-01-07

## 2021-07-13 NOTE — TELEPHONE ENCOUNTER
Routing refill request to provider for review/approval because:  Has not seen Dr. Nathan since 2019. Are you following her TSH?    Shy Humphreys RN

## 2021-08-19 PROBLEM — M79.652 PAIN OF LEFT THIGH: Status: RESOLVED | Noted: 2020-11-17 | Resolved: 2021-08-19

## 2021-08-19 PROBLEM — M25.552 HIP PAIN, LEFT: Status: RESOLVED | Noted: 2020-11-17 | Resolved: 2021-08-19

## 2021-08-19 NOTE — PROGRESS NOTES
Discharge Note    Progress reporting period is from last progress note on   to Dec 15, 2020.    Lolis failed to follow up and current status is unknown.  Please see information below for last relevant information on current status.  Patient seen for 3 visits.    SUBJECTIVE  Subjective changes noted by patient:  Primary c/o L lateral thigh pain in area on mid ITB. Groin pain less.  .  Current pain level is  .     Previous pain level was   .   Changes in function:  Yes (See Goal flowsheet attached for changes in current functional level)  Adverse reaction to treatment or activity: None    OBJECTIVE  Changes noted in objective findings: FADIR still positive with clicking noted. Added strengthening per flow as well as US and STM to L ITB     ASSESSMENT/PLAN  Diagnosis: L hip/thigh pain   Updated problem list and treatment plan:   Pain - HEP  STG/LTGs have been met or progress has been made towards goals:  Yes, please see goal flowsheet for most current information  Assessment of Progress: current status is unknown.    Last current status: Pt is progressing slower than anticipated   Self Management Plans:  HEP  I have re-evaluated this patient and find that the nature, scope, duration and intensity of the therapy is appropriate for the medical condition of the patient.  Lolis continues to require the following intervention to meet STG and LTG's:  HEP.    Recommendations:  Discharge with current home program.  Patient to follow up with MD as needed.    Please refer to the daily flowsheet for treatment today, total treatment time and time spent performing 1:1 timed codes.

## 2021-08-30 DIAGNOSIS — L65.9 HAIR LOSS: Primary | ICD-10-CM

## 2021-09-09 ENCOUNTER — LAB (OUTPATIENT)
Dept: LAB | Facility: CLINIC | Age: 36
End: 2021-09-09
Payer: COMMERCIAL

## 2021-09-09 DIAGNOSIS — L65.9 HAIR LOSS: ICD-10-CM

## 2021-09-09 DIAGNOSIS — E03.8 OTHER SPECIFIED HYPOTHYROIDISM: ICD-10-CM

## 2021-09-09 LAB
ERYTHROCYTE [DISTWIDTH] IN BLOOD BY AUTOMATED COUNT: 12.2 % (ref 10–15)
HCT VFR BLD AUTO: 41.7 % (ref 35–47)
HGB BLD-MCNC: 13.6 G/DL (ref 11.7–15.7)
MCH RBC QN AUTO: 31.6 PG (ref 26.5–33)
MCHC RBC AUTO-ENTMCNC: 32.6 G/DL (ref 31.5–36.5)
MCV RBC AUTO: 97 FL (ref 78–100)
PLATELET # BLD AUTO: 229 10E3/UL (ref 150–450)
RBC # BLD AUTO: 4.31 10E6/UL (ref 3.8–5.2)
TSH SERPL DL<=0.005 MIU/L-ACNC: 0.4 MU/L (ref 0.4–4)
WBC # BLD AUTO: 4.3 10E3/UL (ref 4–11)

## 2021-09-09 PROCEDURE — 36415 COLL VENOUS BLD VENIPUNCTURE: CPT

## 2021-09-09 PROCEDURE — 84443 ASSAY THYROID STIM HORMONE: CPT

## 2021-09-09 PROCEDURE — 85027 COMPLETE CBC AUTOMATED: CPT

## 2022-01-07 DIAGNOSIS — E03.8 SUBCLINICAL HYPOTHYROIDISM: ICD-10-CM

## 2022-01-07 RX ORDER — LEVOTHYROXINE SODIUM 100 UG/1
100 TABLET ORAL DAILY
Qty: 30 TABLET | Refills: 1 | Status: SHIPPED | OUTPATIENT
Start: 2022-01-07 | End: 2022-03-08

## 2022-01-07 NOTE — TELEPHONE ENCOUNTER
Pharmacy sent refill request for levothyroxine. Pt has new OB visit scheduled later this month and TSH will be checked then. Sending short refill now that will last until TSH level is checked.  Alma Haro RN

## 2022-01-20 ENCOUNTER — PRENATAL OFFICE VISIT (OUTPATIENT)
Dept: NURSING | Facility: CLINIC | Age: 37
End: 2022-01-20
Payer: COMMERCIAL

## 2022-01-20 VITALS — HEIGHT: 69 IN | BODY MASS INDEX: 22.81 KG/M2 | WEIGHT: 154 LBS

## 2022-01-20 DIAGNOSIS — O09.529 ENCOUNTER FOR SUPERVISION OF MULTIGRAVIDA WITH ADVANCED MATERNAL AGE: Primary | ICD-10-CM

## 2022-01-20 DIAGNOSIS — Z23 NEED FOR TDAP VACCINATION: ICD-10-CM

## 2022-01-20 PROCEDURE — 99207 PR NO CHARGE NURSE ONLY: CPT

## 2022-01-20 ASSESSMENT — MIFFLIN-ST. JEOR: SCORE: 1462.92

## 2022-01-20 NOTE — PROGRESS NOTES
Important Information for Provider:     New ob nurse intake by phone, fourth pregnancy, AMA.  Termination 2/2021, abnormal CVS. Patient is requesting CVS for this regnancy, ordered MFM. Handouts reviewed and mailed. Ultrasound scheduled for 2/04/2022 with CNM to review. NOB with Dr Lu 2/11/2022. TSH ordered with NOB labs.    Caffeine intake/servings daily - 1  Calcium intake/servings daily - 3  Exercise 5 times weekly - describe ; bikes, weights, walks, precautions given  Sunscreen used - Yes  Seatbelts used - Yes  Guns stored in the home - No  Self Breast Exam - Yes  Pap test up to date -  Yes  Eye exam up to date -  Yes  Dental exam up to date -  Yes  Immunizations reviewed and up to date - Yes  Abuse: Current or Past (Physical, Sexual or Emotional) - No  Do you feel safe in your environment - Yes  Do you cope well with stress - Yes  Do you suffer from insomnia - No           Prenatal OB Questionnaire  Patient supplied answers from flow sheet for:  Prenatal OB Questionnaire.  Past Medical History  Have you ever recieved care for your mental health? : No  Have you ever been in a major accident or suffered serious trauma?: No  Within the last year, has anyone hit, slapped, kicked or otherwise hurt you?: No  In the last year, has anyone forced you to have sex when you didn't want to?: No    Past Medical History 2   Have you ever received a blood transfusion?: No  Would you accept a blood transfusion if was medically recommended?: Yes  Does anyone in your home smoke?: No   Is your blood type Rh negative?: No  Have you ever ?: (!) Yes  Have you been hospitalized for a nonsurgical reason excluding normal delivery?: No  Have you ever had an abnormal pap smear?: No    Past Medical History (Continued)  Do you have a history of abnormalities of the uterus?: No  Did your mother take TEO or any other hormones when she was pregnant with you?: No  Do you have any other problems we have not asked about which you feel  may be important to this pregnancy?: No                     Allergies as of 1/20/2022:    Allergies as of 01/20/2022 - Reviewed 01/20/2022   Allergen Reaction Noted     Sulfa drugs  06/06/2013     Sulfasalazine Unknown 06/06/2013       Current medications are:  Current Outpatient Medications   Medication Sig Dispense Refill     levothyroxine (SYNTHROID/LEVOTHROID) 100 MCG tablet Take 1 tablet (100 mcg) by mouth daily 30 tablet 1     Cetirizine HCl (ZYRTEC PO)  (Patient not taking: Reported on 1/20/2022)           Early ultrasound screening tool:    Does patient have irregular periods?  No  Did patient use hormonal birth control in the three months prior to positive urine pregnancy test? No  Is the patient breastfeeding?  No  Is the patient 10 weeks or greater at time of education visit?  No

## 2022-01-24 ENCOUNTER — TELEPHONE (OUTPATIENT)
Dept: MATERNAL FETAL MEDICINE | Facility: CLINIC | Age: 37
End: 2022-01-24
Payer: COMMERCIAL

## 2022-02-04 ENCOUNTER — ANCILLARY PROCEDURE (OUTPATIENT)
Dept: ULTRASOUND IMAGING | Facility: CLINIC | Age: 37
End: 2022-02-04
Attending: OBSTETRICS & GYNECOLOGY
Payer: COMMERCIAL

## 2022-02-04 ENCOUNTER — OFFICE VISIT (OUTPATIENT)
Dept: MIDWIFE SERVICES | Facility: CLINIC | Age: 37
End: 2022-02-04
Payer: COMMERCIAL

## 2022-02-04 ENCOUNTER — TRANSCRIBE ORDERS (OUTPATIENT)
Dept: MATERNAL FETAL MEDICINE | Facility: CLINIC | Age: 37
End: 2022-02-04

## 2022-02-04 VITALS
TEMPERATURE: 98.2 F | SYSTOLIC BLOOD PRESSURE: 103 MMHG | WEIGHT: 160 LBS | DIASTOLIC BLOOD PRESSURE: 66 MMHG | HEART RATE: 90 BPM | BODY MASS INDEX: 23.63 KG/M2

## 2022-02-04 DIAGNOSIS — O09.529 ENCOUNTER FOR SUPERVISION OF MULTIGRAVIDA WITH ADVANCED MATERNAL AGE: ICD-10-CM

## 2022-02-04 DIAGNOSIS — O26.90 PREGNANCY RELATED CONDITION, ANTEPARTUM: Primary | ICD-10-CM

## 2022-02-04 DIAGNOSIS — O09.521 MULTIGRAVIDA OF ADVANCED MATERNAL AGE IN FIRST TRIMESTER: Primary | ICD-10-CM

## 2022-02-04 PROCEDURE — 99212 OFFICE O/P EST SF 10 MIN: CPT | Performed by: ADVANCED PRACTICE MIDWIFE

## 2022-02-04 PROCEDURE — 76801 OB US < 14 WKS SINGLE FETUS: CPT | Performed by: OBSTETRICS & GYNECOLOGY

## 2022-02-04 NOTE — PROGRESS NOTES
S:  Lolis Johnson is a 36 year old  who presents today for follow up ultrasound. [facility] ultrasound today showed an IUP at 8w3d which agrees with her LMP dating which is 8w1d. She is feeling some early pregnancy symptoms but overall okay. Able to eat and drink just has a bad taste in her mouth afterwards, tired as well. They have no questions or concerns. Their new OB is set up for next week. The genetic counselor is going to give them a call today to get their CVS scheduled. Prefers to get labs next visit instead of today. No other questions or concerns.     O:  /66   Pulse 90   Temp 98.2  F (36.8  C) (Oral)   Wt 72.6 kg (160 lb)   LMP 2021   BMI 23.63 kg/m    Patient is well     A:  IUP at 8w1d by LMP    P:  (O09.521) Multigravida of advanced maternal age in first trimester  (primary encounter diagnosis)  Plan: Follow up as scheduled for new OB visit and genetic visit.     CAROLINA Cuevas CNM

## 2022-02-11 ENCOUNTER — PRENATAL OFFICE VISIT (OUTPATIENT)
Dept: OBGYN | Facility: CLINIC | Age: 37
End: 2022-02-11
Payer: COMMERCIAL

## 2022-02-11 VITALS
BODY MASS INDEX: 24.04 KG/M2 | DIASTOLIC BLOOD PRESSURE: 68 MMHG | SYSTOLIC BLOOD PRESSURE: 107 MMHG | OXYGEN SATURATION: 90 % | HEART RATE: 97 BPM | WEIGHT: 162.8 LBS

## 2022-02-11 DIAGNOSIS — O09.529 ENCOUNTER FOR SUPERVISION OF MULTIGRAVIDA WITH ADVANCED MATERNAL AGE: Primary | ICD-10-CM

## 2022-02-11 DIAGNOSIS — Z82.79 FAMILY HISTORY OF AUTOSOMAL TRANSLOCATION: ICD-10-CM

## 2022-02-11 LAB
ABO/RH(D): NORMAL
ALBUMIN UR-MCNC: NEGATIVE MG/DL
ANTIBODY SCREEN: NEGATIVE
APPEARANCE UR: CLEAR
BILIRUB UR QL STRIP: NEGATIVE
COLOR UR AUTO: YELLOW
ERYTHROCYTE [DISTWIDTH] IN BLOOD BY AUTOMATED COUNT: 11.9 % (ref 10–15)
GLUCOSE UR STRIP-MCNC: NEGATIVE MG/DL
HCT VFR BLD AUTO: 37.5 % (ref 35–47)
HGB BLD-MCNC: 12.5 G/DL (ref 11.7–15.7)
HGB UR QL STRIP: NEGATIVE
KETONES UR STRIP-MCNC: NEGATIVE MG/DL
LEUKOCYTE ESTERASE UR QL STRIP: NEGATIVE
MCH RBC QN AUTO: 32.3 PG (ref 26.5–33)
MCHC RBC AUTO-ENTMCNC: 33.3 G/DL (ref 31.5–36.5)
MCV RBC AUTO: 97 FL (ref 78–100)
NITRATE UR QL: NEGATIVE
PH UR STRIP: 6.5 [PH] (ref 5–7)
PLATELET # BLD AUTO: 220 10E3/UL (ref 150–450)
RBC # BLD AUTO: 3.87 10E6/UL (ref 3.8–5.2)
SP GR UR STRIP: 1.01 (ref 1–1.03)
SPECIMEN EXPIRATION DATE: NORMAL
UROBILINOGEN UR STRIP-ACNC: 0.2 E.U./DL
WBC # BLD AUTO: 9.1 10E3/UL (ref 4–11)

## 2022-02-11 PROCEDURE — 86900 BLOOD TYPING SEROLOGIC ABO: CPT | Performed by: OBSTETRICS & GYNECOLOGY

## 2022-02-11 PROCEDURE — 86901 BLOOD TYPING SEROLOGIC RH(D): CPT | Performed by: OBSTETRICS & GYNECOLOGY

## 2022-02-11 PROCEDURE — 85027 COMPLETE CBC AUTOMATED: CPT | Performed by: OBSTETRICS & GYNECOLOGY

## 2022-02-11 PROCEDURE — 86780 TREPONEMA PALLIDUM: CPT | Performed by: OBSTETRICS & GYNECOLOGY

## 2022-02-11 PROCEDURE — 86803 HEPATITIS C AB TEST: CPT | Performed by: OBSTETRICS & GYNECOLOGY

## 2022-02-11 PROCEDURE — 99207 PR FIRST OB VISIT: CPT | Performed by: OBSTETRICS & GYNECOLOGY

## 2022-02-11 PROCEDURE — 86850 RBC ANTIBODY SCREEN: CPT | Performed by: OBSTETRICS & GYNECOLOGY

## 2022-02-11 PROCEDURE — 86762 RUBELLA ANTIBODY: CPT | Performed by: OBSTETRICS & GYNECOLOGY

## 2022-02-11 PROCEDURE — 81003 URINALYSIS AUTO W/O SCOPE: CPT | Performed by: OBSTETRICS & GYNECOLOGY

## 2022-02-11 PROCEDURE — 87086 URINE CULTURE/COLONY COUNT: CPT | Performed by: OBSTETRICS & GYNECOLOGY

## 2022-02-11 PROCEDURE — 36415 COLL VENOUS BLD VENIPUNCTURE: CPT | Performed by: OBSTETRICS & GYNECOLOGY

## 2022-02-11 PROCEDURE — 84443 ASSAY THYROID STIM HORMONE: CPT | Performed by: OBSTETRICS & GYNECOLOGY

## 2022-02-11 PROCEDURE — 87389 HIV-1 AG W/HIV-1&-2 AB AG IA: CPT | Performed by: OBSTETRICS & GYNECOLOGY

## 2022-02-11 PROCEDURE — 87340 HEPATITIS B SURFACE AG IA: CPT | Performed by: OBSTETRICS & GYNECOLOGY

## 2022-02-12 LAB
BACTERIA UR CULT: NORMAL
T PALLIDUM AB SER QL: NONREACTIVE

## 2022-02-12 NOTE — PROGRESS NOTES
SUBJECTIVE: Lolis Johnson is a 36 year old   here for initial OB visit.  Doing well   Mild nausea and malaise.   Has CVS scheduled next week - known unbalanced translocation. Conceived with IVF x 2. One pregnancy complicated with loss of triplets at 19 weeks with fetuses affected and unaffected by translocation. IVF pregnancy without translocation and uncomplicated delivery.   Spontaneous conception affected by translocation - lethal and treated with termination.   Now spontaneous conception.  Hypothyroidism - subclinical converted to clinical.       Past Medical History:   Diagnosis Date     Infertility, female      Subclinical hypothyroidism 2016     Varicella        Past Surgical History:   Procedure Laterality Date     DILATION AND EVACUATION N/A 2021    Procedure: DILATION AND CURETTAGE, UTERUS;  Surgeon: Christiane Lu MD;  Location: [facility] OR     SINUS SURGERY         Family History   Problem Relation Age of Onset     Breast Cancer Paternal Grandmother        Social History     Socioeconomic History     Marital status:      Spouse name: Not on file     Number of children: Not on file     Years of education: Not on file     Highest education level: Not on file   Occupational History     Not on file   Tobacco Use     Smoking status: Never Smoker     Smokeless tobacco: Never Used   Vaping Use     Vaping Use: Never used   Substance and Sexual Activity     Alcohol use: Not Currently     Comment: occ     Drug use: No     Sexual activity: Yes     Partners: Male     Birth control/protection: None   Other Topics Concern     Parent/sibling w/ CABG, MI or angioplasty before 65F 55M? No   Social History Narrative     Not on file     Social Determinants of Health     Financial Resource Strain: Not on file   Food Insecurity: Not on file   Transportation Needs: Not on file   Physical Activity: Not on file   Stress: Not on file   Social Connections: Not on file   Intimate Partner Violence: Not  At Risk     Fear of Current or Ex-Partner: No     Emotionally Abused: No     Physically Abused: No     Sexually Abused: No   Housing Stability: Not on file         Current Outpatient Medications:      levothyroxine (SYNTHROID/LEVOTHROID) 100 MCG tablet, Take 1 tablet (100 mcg) by mouth daily, Disp: 30 tablet, Rfl: 1     Prenatal Vit-DSS-Fe Cbn-FA (PRENATAL AD PO), , Disp: , Rfl:      Cetirizine HCl (ZYRTEC PO), , Disp: , Rfl:     Allergies   Allergen Reactions     Sulfa Drugs      Sulfasalazine Unknown         Past Medical History of Father of Baby: unbalanced translocation    Review of Systems:   Constitutional, HEENT, cardiovascular, pulmonary, gi and gu systems are negative, except as otherwise noted.     History Since Last Menstrual Period: No Problems    EXAM:   Vitals:    02/11/22 1430   BP: 107/68   Pulse: 97   SpO2: 90%   Weight: 73.8 kg (162 lb 12.8 oz)     Body mass index is 24.04 kg/m .  GENERAL APPEARANCE: healthy, alert and no distress  EYES: EOMI,  PERRL  HENT: Nose and mouth without ulcers or lesions  NECK: no adenopathy, no asymmetry, masses, or scars and thyroid normal to palpation  RESP: lungs clear to auscultation - no rales, rhonchi or wheezes  BREAST: normal without masses, tenderness or nipple discharge and no palpable axillary masses or adenopathy  CV: regular rates and rhythm, normal S1 S2, no S3 or S4 and no murmur, click or rub -  ABDOMEN:  soft, nontender, no HSM or masses and bowel sounds normal  : normal cervix, adnexae, and uterus without masses or discharge  MS: extremities normal- no gross deformities noted, no evidence of inflammation in joints, FROM in all extremities.  SKIN: no suspicious lesions or rashes  NEURO: Normal strength and tone, sensory exam grossly normal, mentation intact and speech normal  PSYCH: mentation appears normal and affect normal/bright  LYMPHATICS: No axillary, cervical, inguinal, or supraclavicular nodes    Pelvix exam:  Perineum: Intact;   Vulva:  Normal;  Vagina: Normal mucosa, no discharge,   Cervix: Parous, closed, mobile, no discharge;  Uterus: 9 weeks, Normal shape, position and consistency;   Adnexa: Normal;  Anus: Normal without lesion or mass;   Bony Pelvis: Adequate.     Ultrasound: Viable mo IUP c/w prior dates.     ASSESSMENT/ PLAN:  Lolis Johnson is a 36 year old   at 9 weeks 1 days by LMP c/w .   Follow up in 2 weeks.  Normal exercise.  Normal sexual activity.  Prenatal vitamins.  Anticipated weight gain:  BMI <25: 25-35 pounds  S/p flu and covid shots   TDaP 27-36 weeks  Oriented to practice, PNC  Discussed aneuploidy screening, has CVS scheduled. Discussed AMA. Known unbalanced translocation.

## 2022-02-13 LAB
HBV SURFACE AG SERPL QL IA: NONREACTIVE
HCV AB SERPL QL IA: NONREACTIVE
HIV 1+2 AB+HIV1 P24 AG SERPL QL IA: NONREACTIVE
TSH SERPL DL<=0.005 MIU/L-ACNC: 2.73 MU/L (ref 0.4–4)

## 2022-02-14 LAB
RUBV IGG SERPL QL IA: 7.4 INDEX
RUBV IGG SERPL QL IA: POSITIVE

## 2022-02-18 ENCOUNTER — PRE VISIT (OUTPATIENT)
Dept: MATERNAL FETAL MEDICINE | Facility: CLINIC | Age: 37
End: 2022-02-18
Payer: COMMERCIAL

## 2022-02-21 NOTE — PROGRESS NOTES
[facility]  Genetic Counseling Consult    Patient:  Lolis Johnson YOB: 1985   Date of Service:  22      Lolis Johnson was seen at the [facility] for genetic consultation for the indication of a known balanced translocation in her partner, Paok.       Impression/Plan:   1.  Lolis had a genetic consultation today. Today she has elected to pursue chorionic villus sampling and consent was obtained. The following was ordered on the prenatal sample: chromosome analysis (karyotype). Results are expected within 10-14 days. All results will be available in [facility]. We will contact her to discuss the results, and a copy will be forwarded to the referring OB provider. Lolis provided verbal permission for detailed results to be left on her voicemail. The couple is comfortable with the sex of the fetus being disclosed over the phone.     Pregnancy History:   /Parity:    Age at Delivery: 37 year old  RENETTA: 9/15/2022, by Last Menstrual Period  Gestational Age: 10w4d    No significant complications or exposures were reported in the current pregnancy.    Lolis reyes pregnancy history is significant for:  o  IVF triplet pregnancy. CVS performed, fetus with unbalanced rearrangement reduced. Pregnancy lost at 19 weeks  o  IVF mo pregnancy with PGT-SR, full-term female, alive and well today  o  spontaneous mo pregnancy, CVS performed and consistent with unbalanced rearrangement. Pregnancy terminated.     Medical History:   Lolis reyes reported medical history is not expected to impact pregnancy management or risks to fetal development.       Family History:   A three-generation pedigree was previously obtained by Kathy Nugent in 2015. I discussed the family history with the couple today to obtain any significant updates. They report no new deaths or diagnoses.    Otherwise, the reported family history is  negative for multiple miscarriages, stillbirths, birth defects, intellectual disability, known genetic conditions, and consanguinity.       Carrier Screening:   The patient reports that she and the father of the pregnancy have  ancestry:     Cystic fibrosis is an autosomal recessive genetic condition that occurs with increased frequency in individuals of  ancestry and carrier screening for this condition is available.  In addition,  screening in the [facility] includes cystic fibrosis.      Expanded carrier screening for mutations in a large panel of genes associated with autosomal recessive conditions including cystic fibrosis, spinal muscular atrophy, and others, is now available.      Lolis has previously declined carrier screening. Carrier screening was beyond the scope of our discussion today, but remains available if the couple is interested at any point.        Risk Assessment:   We explained that the risk for fetal chromosome abnormalities increases with maternal age. We discussed specific features of common chromosome abnormalities, including Down syndrome, trisomy 13, trisomy 18, and sex chromosome conditions.      - At age 37 at midtrimester, the risk to have a baby with Down syndrome is 1 in 274.     - At age 37 at midtrimester, the risk to have a baby with any chromosome abnormality is 1 in 135.     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 The couple has previously had extensive genetic counseling with Kathy Nugent in  and Rashmi Paez in . Please see their documentation for additional details.     Today, we briefly reviewed the below information. The couple demonstrates a strong understanding of Pako's translocation, the possible outcomes in the pregnancy, and testing options available to them.     We reviewed that Pako has the expected number of chromosomes and information contained within the chromosomes, but has  "a rearrangement within that information. We reviewed the different possible gametes generated from this translocation and the possibilities of structurally normal chromosomes, the balanced translocation with no expected health consequences other than reproductive, or gametes with an unbalanced chromosome complement that could result in an increased chance of pregnancy loss or could survive to term and may have some or all of the below-mentioned concerns.     From a review of the medical literature, the following associations are reported; however, again, it is difficult to know what the exact combination of chromosome 9 and chromosome 11 deletions and duplications would yield:    11q duplications:  Prenatal and  growth restriction, facial/body asymmetry, small penis size in males, very short mandible, lowset posteriorly rotated ears, autism spectrum symptoms, cognitive delay, decreased language, pain insensitivities.      9q deletions: \"Kleefstra Syndrome\" - severe mental retardation, hypotonia, microcephaly, seizures, large tongue size, conotruncal heart defects (in some, note all)    11q deletions:  \"Bruce Syndrome\" -  intrauterine growth retardation, cognitive delay, trigonocephaly, loweset ears, hypertelorism, thrombocytopenia, heart defects in 50%, undescended testis in males, increased ear and sinus infections    9q duplications: characteristic facial features including small mouth and eyes, long thin feet, speech delay, cognitive delay, ADHD/autism-spectrum symptoms      We discussed that there could be some variability in outcome for a liveborn child with a chromosome imbalance, but that it is extremely difficult to predict. Lolis and Pako shared that they would choose not to continue the pregnancy if an unbalanced form is identified.     Lolis opted to pursue chorionic villus sampling (CVS) today.    Testing Options:   We discussed the following options:   Genetic Amniocentesis    Invasive " procedure typically performed in the second trimester by which amniotic fluid is obtained for the purpose of chromosome analysis and/or other prenatal genetic analysis    Diagnostic results; >99% sensitivity for fetal chromosome abnormalities    AFAFP measurement tests for open neural tube defects    Chorionic villus sampling (CVS)    Invasive procedure typically performed in the first trimester by which placental villi are obtained for the purpose of chromosome analysis and/or other prenatal genetic analysis    Diagnostic results; >99% sensitivity for fetal chromosome abnormalities    Results are not guaranteed.    Cannot test for open neural tube defects; maternal serum AFP after 15 weeks is recommended    The risk of pregnancy loss association with CVS is generally estimated to be 1/300 to 1/500.      We reviewed the chorionic villus sampling consent form as well as the genetic testing consent form. All questions were answered to the patient's apparent satisfaction. The following testing was ordered on the prenatal sample:     FISH analysis and microarray analysis were declined    Chromosome analysis with results likely taking 10-14 days    For CVS specimens with a normal female karyotype result and no microarray testing, long term study will be performed and resulted separately.     Results:    Due to the sample being placental in origin, rather than fetal, there is a less than 1% chance the results will be discordant from the fetus. Given a positive result this chance may be lower if ultrasound anomalies or aneuploidy markers are detected.    Final CVS results are contingent upon the maternal cell contamination results.    Results will be communicated to the patient, forwarded to the referring provider, and available in EPIC.       Chromosome analysis (karyotype/g-bands) assess each chromosome to provide information regarding number, structure, and location of all chromosomes. This can rule in or rule out full  chromosome conditions, such as Down syndrome.    These results will be available in [facility].  We will contact her to discuss the results, and a copy will be forwarded to the office of the referring OB provider.    We reviewed the benefits and limitations of this testing.  Screening tests provide a risk assessment specific to the pregnancy for certain fetal chromosome abnormalities, but cannot definitively diagnose or exclude a fetal chromosome abnormality.  Follow-up genetic counseling and consideration of diagnostic testing is recommended with any abnormal screening result.     Diagnostic tests carry inherent risks- including risk of miscarriage- that require careful consideration.  These tests can detect fetal chromosome abnormalities with greater than 99% certainty.  Results can be compromised by maternal cell contamination or mosaicism, and are limited by the resolution of cytogenetic G-banding technology.  There is no screening nor diagnostic test that can detect all forms of birth defects or mental disability.     It was a pleasure to be involved with Lolis reyes care. Face-to-face time of the meeting was 30 minutes.      Charity Hernandez [facility], [facility]  Licensed Genetic Counselor  [facility]  Maternal Fetal Medicine  Phone: [phone]  [email or web address]

## 2022-02-22 ENCOUNTER — OFFICE VISIT (OUTPATIENT)
Dept: MATERNAL FETAL MEDICINE | Facility: CLINIC | Age: 37
End: 2022-02-22
Attending: OBSTETRICS & GYNECOLOGY
Payer: COMMERCIAL

## 2022-02-22 ENCOUNTER — HOSPITAL ENCOUNTER (OUTPATIENT)
Dept: ULTRASOUND IMAGING | Facility: CLINIC | Age: 37
End: 2022-02-22
Attending: OBSTETRICS & GYNECOLOGY
Payer: COMMERCIAL

## 2022-02-22 DIAGNOSIS — O09.521 MULTIGRAVIDA OF ADVANCED MATERNAL AGE IN FIRST TRIMESTER: ICD-10-CM

## 2022-02-22 DIAGNOSIS — O26.90 PREGNANCY RELATED CONDITION, ANTEPARTUM: ICD-10-CM

## 2022-02-22 DIAGNOSIS — Z82.79 FAMILY HISTORY OF AUTOSOMAL TRANSLOCATION: ICD-10-CM

## 2022-02-22 DIAGNOSIS — O09.521 MULTIGRAVIDA OF ADVANCED MATERNAL AGE IN FIRST TRIMESTER: Primary | ICD-10-CM

## 2022-02-22 DIAGNOSIS — Z82.79 FAMILY HISTORY OF AUTOSOMAL TRANSLOCATION: Primary | ICD-10-CM

## 2022-02-22 PROCEDURE — 36415 COLL VENOUS BLD VENIPUNCTURE: CPT | Performed by: OBSTETRICS & GYNECOLOGY

## 2022-02-22 PROCEDURE — 76945 ECHO GUIDE VILLUS SAMPLING: CPT | Mod: XS

## 2022-02-22 PROCEDURE — 76801 OB US < 14 WKS SINGLE FETUS: CPT | Mod: 26 | Performed by: OBSTETRICS & GYNECOLOGY

## 2022-02-22 PROCEDURE — 76945 ECHO GUIDE VILLUS SAMPLING: CPT | Mod: 26 | Performed by: OBSTETRICS & GYNECOLOGY

## 2022-02-22 PROCEDURE — 59015 CHORION BIOPSY: CPT | Performed by: OBSTETRICS & GYNECOLOGY

## 2022-02-22 PROCEDURE — 81265 STR MARKERS SPECIMEN ANAL: CPT | Performed by: OBSTETRICS & GYNECOLOGY

## 2022-02-22 PROCEDURE — 76945 ECHO GUIDE VILLUS SAMPLING: CPT

## 2022-02-22 PROCEDURE — 59015 CHORION BIOPSY: CPT

## 2022-02-22 PROCEDURE — 250N000011 HC RX IP 250 OP 636: Performed by: OBSTETRICS & GYNECOLOGY

## 2022-02-22 PROCEDURE — 59015 CHORION BIOPSY: CPT | Mod: XS

## 2022-02-22 PROCEDURE — 96040 HC GENETIC COUNSELING, EACH 30 MINUTES: CPT

## 2022-02-22 PROCEDURE — 88267 CHROMOSOME ANALYS PLACENTA: CPT | Performed by: OBSTETRICS & GYNECOLOGY

## 2022-02-22 RX ORDER — HEPARIN SODIUM (PORCINE) LOCK FLUSH IV SOLN 100 UNIT/ML 100 UNIT/ML
1 SOLUTION INTRAVENOUS ONCE
Status: COMPLETED | OUTPATIENT
Start: 2022-02-22 | End: 2022-02-22

## 2022-02-22 RX ADMIN — HEPARIN 1 ML: 100 SYRINGE at 11:00

## 2022-02-22 NOTE — NURSING NOTE
Pt here for Chorionic Villi Sampling d/t fam hx balanced translocation. See genetic counseling dictation.  After consent signed and TimeOut completed, Dr. Lundberg withdrew adequate villi xone transabdominal pass.  Maternal cell contamination bloodwork drawn in clinic.  Pt is RH positive.  MD reviewed blood type and screen and Rhogam not indicated. Discharge teaching completed and questions answered.   Patient reports no pain.  Pt discharged ambulatory and stable.  Charge tech pager called to notify of specimen, specimen transported to [facility] and handed off to Aroldo at 1218.  Work-aid completed, signed and accompanied specimen.

## 2022-02-22 NOTE — PROGRESS NOTES
"Please see \"Imaging\" tab under \"Chart Review\" for details of today's visit.    Shaista Lundberg    "

## 2022-02-22 NOTE — PATIENT INSTRUCTIONS
As part of your visit in Maternal Fetal Medicine, you elected to have a chorionic villus sampling (CVS), an invasive diagnostic procedure. The following information was discussed.:    CVS is an invasive procedure in which placental villi are obtained for the purpose of chromosome analysis and/or other prenatal genetic analysis. CVS cannot test for open neural tube defects; maternal serum AFP after 15 weeks is recommended    The risk of pregnancy loss association with CVS is generally estimated to be 1/300 to 1/500.     Cramping is very common. It is usually mild and goes away within a few hours. You may take Acetaminophen (Tylenol), if needed.    Avoid strenuous activities for the rest of the day after the CVS is done. This includes heavy lifting, jogging or other exercise.    You should call your care regular obstetric (OB) care provider if you have:   Heavy bleeding (like a period, or more) beyond the day of the test.   Clear fluid (like water) leaking from your vagina.   Severe abdominal (belly) pain.   Flu-like symptoms within two weeks of the test. These include chills, muscle aches or a fever over 100.4 F (38 C) (under the tongue).  The following testing was ordered on the CVS sample:    Chromosome analysis with results likely taking 10-14 days    For CVS specimens with a normal female karyotype result and no microarray testing, retirement study will be performed and resulted separately.    Due to the sample being placental in origin, rather than fetal, there is a less than 1% chance the results will be discordant from the fetus. Given a positive result this chance may be lower if ultrasound anomalies or aneuploidy markers are detected.    Final CVS results are contingent upon the maternal cell contamination results.    Results are not guaranteed  Results will be communicated to you, forwarded to your primary OB provider, and available in EPIC.       Genetic counselor contact information:    Charity Hernandez, [facility],  [facility]  Licensed Genetic Counselor  [facility]  Maternal Fetal Medicine  Phone: [phone]    Rashmi Paez MS, [facility]  Licensed Genetic Counselor  [facility]  Maternal Fetal Medicine  [phone]

## 2022-02-24 LAB — MCCMA SPECIMEN: NORMAL

## 2022-03-04 LAB — MATERNAL CELL CONTAM, MATERNAL SPECIMEN: NORMAL

## 2022-03-07 ENCOUNTER — TELEPHONE (OUTPATIENT)
Dept: MATERNAL FETAL MEDICINE | Facility: CLINIC | Age: 37
End: 2022-03-07
Payer: COMMERCIAL

## 2022-03-07 DIAGNOSIS — O28.3 ABNORMAL PRENATAL ULTRASOUND: Primary | ICD-10-CM

## 2022-03-07 LAB
KARYOTYP CVS: NORMAL
LABORATORY REPORT: NORMAL
Lab: NORMAL
PERFORMING LABORATORY: NORMAL
SCANNED LAB RESULT: NORMAL
TEST NAME: NORMAL

## 2022-03-07 NOTE — TELEPHONE ENCOUNTER
"March 7, 2022    I spoke to Lolis regarding chromosome analysis results from chorionic villus sampling (CVS) procedure, which yieled NORMAL results, consistent with a female fetus with 46 structurally normal chromosomes (46, XX). Maternal cell contamination studies resulted with the chromosome analysis detected a single fetal genotype with no evidence of maternal cell contamination.      This analysis did not identify a balanced or unbalanced form of the translocation carried by the father of the pregnancy. Additionally, this analysis did not detect any other large deletions/duplications and/or rearrangements of chromosomal material of clinical significance. This analysis does not rule out the possibility of a microdeletion or microduplication.     We briefly reviewed the finding of increased nuchal translucency (>95th percentile) on ultrasound at 10w5d at the time of CVS procedure. Increased nuchal translucency has been associated with an increased risk for aneuploidy, heart defects, and other more rare genetic conditions. Discussed that a microarray could be added on to testing on the chorionic villus sample to evaluate for the possibility of a copy number variation that was too small to be detected by chromosome analysis. Also discussed the option of returning to clinic to reevaluate nuchal translucency measurement. After discussion, Lolis would like to return to clinic this week for repeat ultrasound to reevaluate nuchal translucency and consider additional testing based on ultrasound. She is aware that genetic counseling will be available if needed after that ultrasound to discuss additional testing. This plan was reviewed with [facility] physician Dr. Lundberg.    All of Lolis's questions were answered to her apparent satisfaction today. She was encouraged to remain in contact if any additional questions arise moving forward. A copy of the patient's results can be found under the \"laboratory\" tab in the patient's chart. "     Charity Hernandez [facility], [facility]  Licensed Genetic Counselor  [facility]  Maternal Fetal Medicine  Phone: [phone]  [email or web address]

## 2022-03-07 NOTE — TELEPHONE ENCOUNTER
March 7, 2022    Lolis left me a voicemail to check on the status of chromosome analysis results from her recent CVS procedure. She also let me know that given the increased nuchal translucency measurement on the ultrasound at the time of her procedure, she is suspicious that results will return positive for a chromosome imbalance, in which case she would plan to terminate the pregnancy. She is wondering if we can start the process to get her scheduled for a D&C in the event final results do return positive.     I returned Lolis's call and let her know that final chromosome results are still pending as of today at 9:00 am. I will call her as soon as those results return. In the meantime, we will start insurance investigation for D&C procedure and will let Lolis know as soon as we have more information. Additionally, we discussed 24hr WRTK consent and I will plan to have [facility] physician call Lolis tomorrow to review this.     All of Lolis's questions were answered at this time. Lolis was encouraged to reach out as needed if additional questions arise.     Charity Hernandez, [facility], [facility]  Licensed Genetic Counselor  [facility]  Maternal Fetal Medicine  Phone: [phone]  [email or web address]

## 2022-03-07 NOTE — TELEPHONE ENCOUNTER
March 7, 2022    I spoke with [facility] genetic counselor Janis about Lolis's CVS sample and she confirmed that culture will be maintained and that a microarray will be able to be added on after Lolis's follow-up ultrasound this Wednesday if desired.    Charity Hernandez, [facility], [facility]  Licensed Genetic Counselor  [facility]  Maternal Fetal Medicine  Phone: [phone]  [email or web address]

## 2022-03-08 DIAGNOSIS — E03.8 SUBCLINICAL HYPOTHYROIDISM: ICD-10-CM

## 2022-03-08 RX ORDER — LEVOTHYROXINE SODIUM 100 UG/1
100 TABLET ORAL DAILY
Qty: 30 TABLET | Refills: 1 | Status: SHIPPED | OUTPATIENT
Start: 2022-03-08 | End: 2022-05-07

## 2022-03-09 ENCOUNTER — HOSPITAL ENCOUNTER (OUTPATIENT)
Dept: ULTRASOUND IMAGING | Facility: CLINIC | Age: 37
End: 2022-03-09
Attending: OBSTETRICS & GYNECOLOGY
Payer: COMMERCIAL

## 2022-03-09 ENCOUNTER — OFFICE VISIT (OUTPATIENT)
Dept: MATERNAL FETAL MEDICINE | Facility: CLINIC | Age: 37
End: 2022-03-09
Attending: OBSTETRICS & GYNECOLOGY
Payer: COMMERCIAL

## 2022-03-09 DIAGNOSIS — O09.521 MULTIGRAVIDA OF ADVANCED MATERNAL AGE IN FIRST TRIMESTER: ICD-10-CM

## 2022-03-09 DIAGNOSIS — Z82.79 FAMILY HISTORY OF AUTOSOMAL TRANSLOCATION: Primary | ICD-10-CM

## 2022-03-09 DIAGNOSIS — O28.3 ABNORMAL PRENATAL ULTRASOUND: ICD-10-CM

## 2022-03-09 PROCEDURE — 76813 OB US NUCHAL MEAS 1 GEST: CPT | Mod: 26 | Performed by: OBSTETRICS & GYNECOLOGY

## 2022-03-09 PROCEDURE — 76813 OB US NUCHAL MEAS 1 GEST: CPT

## 2022-03-09 NOTE — PROGRESS NOTES
"Please see \"Imaging\" tab under \"Chart Review\" for details of today's US at the [facility].    Randal Gordon MD  Maternal-Fetal Medicine      "

## 2022-03-10 ENCOUNTER — TELEPHONE (OUTPATIENT)
Dept: MATERNAL FETAL MEDICINE | Facility: CLINIC | Age: 37
End: 2022-03-10
Payer: COMMERCIAL

## 2022-03-10 ENCOUNTER — MYC MEDICAL ADVICE (OUTPATIENT)
Dept: OBGYN | Facility: CLINIC | Age: 37
End: 2022-03-10
Payer: COMMERCIAL

## 2022-03-10 NOTE — TELEPHONE ENCOUNTER
March 10, 2022    I called Lolis to check in with her following her ultrasound yesterday. Left a voicemail letting her know that CVS culture remains at [facility] and asked her to return my call to discuss her wishes regarding this sample (whether any additional testing is desired and/or whether she wishes to have the sample frozen at [facility] to allow for the possibility of testing later in the pregnancy if there is an indication).     Charity Hernandez, [facility], [facility]  Licensed Genetic Counselor  [facility]  Maternal Fetal Medicine  Phone: [phone]  [email or web address]

## 2022-03-11 NOTE — TELEPHONE ENCOUNTER
March 11, 2022    I called Lolis again to check in. She shared that she and Pako are feeling very relieved by the CVS result indicating normal chromosomes and the normal nuchal translucency measurement on repeat NT ultrasound earlier this week. They are still adjusting to this news, but are feeling excited about the pregnancy.     We discussed that CVS culture remains at [facility]. Additional testing, such as microarray could still be performed on this sample as previously discussed. Lolis does not desire any further testing at this time. I also offered the option of freezing CVS culture to allow for the possibility of additional testing later in the pregnancy is there is any indication. Lolis declined this option and stated she is comfortable with the sample being discarded and does not anticipate that she will desire any further testing. I will notify [facility] laboratory that no additional testing is desired on CVS sample.     I encouraged Lolis to reach out to me if any other questions arise or there is anything else I can help with. Otherwise, she will return to clinic for comprehensive level II ultrasound on 4/20/2022.     Charity Hernandez, [facility], [facility]  Licensed Genetic Counselor  [facility]  Maternal Fetal Medicine  Phone: [phone]  [email or web address]

## 2022-04-01 ENCOUNTER — PRENATAL OFFICE VISIT (OUTPATIENT)
Dept: OBGYN | Facility: CLINIC | Age: 37
End: 2022-04-01
Payer: COMMERCIAL

## 2022-04-01 VITALS
SYSTOLIC BLOOD PRESSURE: 108 MMHG | HEART RATE: 115 BPM | OXYGEN SATURATION: 99 % | BODY MASS INDEX: 25.02 KG/M2 | WEIGHT: 169.4 LBS | DIASTOLIC BLOOD PRESSURE: 69 MMHG

## 2022-04-01 DIAGNOSIS — O09.529 ENCOUNTER FOR SUPERVISION OF MULTIGRAVIDA WITH ADVANCED MATERNAL AGE: Primary | ICD-10-CM

## 2022-04-01 PROCEDURE — 99000 SPECIMEN HANDLING OFFICE-LAB: CPT | Performed by: OBSTETRICS & GYNECOLOGY

## 2022-04-01 PROCEDURE — 82105 ALPHA-FETOPROTEIN SERUM: CPT | Mod: 90 | Performed by: OBSTETRICS & GYNECOLOGY

## 2022-04-01 PROCEDURE — 99207 PR PRENATAL VISIT: CPT | Performed by: OBSTETRICS & GYNECOLOGY

## 2022-04-01 PROCEDURE — 36415 COLL VENOUS BLD VENIPUNCTURE: CPT | Performed by: OBSTETRICS & GYNECOLOGY

## 2022-04-01 NOTE — PROGRESS NOTES
Doing ok.   Having excess saliva. Some heartburn. Taking something OTC, will look at what, dose.   Using small bland meals.   AFP today.   CVS showed 46 XX no translocation.   RTC 4 weeks

## 2022-04-03 LAB
# FETUSES US: NORMAL
AFP MOM SERPL: 0.84
AFP SERPL-MCNC: 27 NG/ML
AGE - REPORTED: 35.5 YR
CURRENT SMOKER: NO
FAMILY MEMBER DISEASES HX: NO
GA METHOD: NORMAL
GA: NORMAL WK
IDDM PATIENT QL: NO
INTEGRATED SCN PATIENT-IMP: NORMAL
SPECIMEN DRAWN SERPL: NORMAL

## 2022-04-20 ENCOUNTER — OFFICE VISIT (OUTPATIENT)
Dept: MATERNAL FETAL MEDICINE | Facility: CLINIC | Age: 37
End: 2022-04-20
Attending: OBSTETRICS & GYNECOLOGY
Payer: COMMERCIAL

## 2022-04-20 ENCOUNTER — HOSPITAL ENCOUNTER (OUTPATIENT)
Dept: ULTRASOUND IMAGING | Facility: CLINIC | Age: 37
Discharge: HOME | End: 2022-04-20
Attending: OBSTETRICS & GYNECOLOGY
Payer: COMMERCIAL

## 2022-04-20 DIAGNOSIS — Z82.79 FAMILY HISTORY OF AUTOSOMAL TRANSLOCATION: ICD-10-CM

## 2022-04-20 DIAGNOSIS — Q95.0 BALANCED CHROMOSOMAL TRANSLOCATION: Primary | ICD-10-CM

## 2022-04-20 DIAGNOSIS — O09.521 MULTIGRAVIDA OF ADVANCED MATERNAL AGE IN FIRST TRIMESTER: ICD-10-CM

## 2022-04-20 PROCEDURE — 76811 OB US DETAILED SNGL FETUS: CPT | Mod: 26 | Performed by: OBSTETRICS & GYNECOLOGY

## 2022-04-20 PROCEDURE — 76811 OB US DETAILED SNGL FETUS: CPT

## 2022-04-22 ENCOUNTER — E-VISIT (OUTPATIENT)
Dept: URGENT CARE | Facility: URGENT CARE | Age: 37
End: 2022-04-22
Payer: COMMERCIAL

## 2022-04-22 DIAGNOSIS — R05.9 COUGH: Primary | ICD-10-CM

## 2022-04-22 NOTE — PATIENT INSTRUCTIONS
Dear Lolis Johnson,    We are sorry you are not feeling well. Based on the responses you provided, it is recommended that you be seen in-person in urgent care so we can better evaluate your symptoms. Please click here to find the nearest urgent care location to you.   You will not be charged for this Visit. Thank you for trusting us with your care.    CAROLINA Pro CNP

## 2022-05-06 DIAGNOSIS — E03.8 SUBCLINICAL HYPOTHYROIDISM: ICD-10-CM

## 2022-05-07 RX ORDER — LEVOTHYROXINE SODIUM 100 UG/1
100 TABLET ORAL DAILY
Qty: 30 TABLET | Refills: 0 | Status: SHIPPED | OUTPATIENT
Start: 2022-05-07 | End: 2022-06-06

## 2022-05-19 ENCOUNTER — PRENATAL OFFICE VISIT (OUTPATIENT)
Dept: OBGYN | Facility: CLINIC | Age: 37
End: 2022-05-19
Payer: COMMERCIAL

## 2022-05-19 VITALS
SYSTOLIC BLOOD PRESSURE: 109 MMHG | HEART RATE: 83 BPM | TEMPERATURE: 97.8 F | BODY MASS INDEX: 25.84 KG/M2 | WEIGHT: 175 LBS | DIASTOLIC BLOOD PRESSURE: 62 MMHG

## 2022-05-19 DIAGNOSIS — O09.529 ENCOUNTER FOR SUPERVISION OF MULTIGRAVIDA WITH ADVANCED MATERNAL AGE: Primary | ICD-10-CM

## 2022-05-19 PROCEDURE — 99207 PR PRENATAL VISIT: CPT | Performed by: OBSTETRICS & GYNECOLOGY

## 2022-05-19 NOTE — PROGRESS NOTES
Doing well.   Good fetal movement.   GERD is ok, a little better on omeprazole, making lifestyle modifications.   GCT, hgb, TSH next visit.   RTC 4 weeks

## 2022-05-28 ENCOUNTER — HOSPITAL ENCOUNTER (EMERGENCY)
Facility: HOSPITAL | Age: 37
Discharge: HOME | End: 2022-05-28
Admitting: PHYSICIAN ASSISTANT
Payer: COMMERCIAL

## 2022-05-28 ENCOUNTER — APPOINTMENT (OUTPATIENT)
Dept: RADIOLOGY | Facility: HOSPITAL | Age: 37
End: 2022-05-28
Payer: COMMERCIAL

## 2022-05-28 ENCOUNTER — NURSE TRIAGE (OUTPATIENT)
Dept: NURSING | Facility: CLINIC | Age: 37
End: 2022-05-28
Payer: COMMERCIAL

## 2022-05-28 ENCOUNTER — MYC MEDICAL ADVICE (OUTPATIENT)
Dept: OBGYN | Facility: CLINIC | Age: 37
End: 2022-05-28

## 2022-05-28 VITALS
HEIGHT: 69 IN | BODY MASS INDEX: 25.92 KG/M2 | WEIGHT: 175 LBS | SYSTOLIC BLOOD PRESSURE: 90 MMHG | DIASTOLIC BLOOD PRESSURE: 60 MMHG | OXYGEN SATURATION: 98 % | TEMPERATURE: 99 F | RESPIRATION RATE: 18 BRPM | HEART RATE: 110 BPM

## 2022-05-28 DIAGNOSIS — U07.1 COVID-19 AFFECTING PREGNANCY IN SECOND TRIMESTER: Primary | ICD-10-CM

## 2022-05-28 DIAGNOSIS — O98.512 COVID-19 AFFECTING PREGNANCY IN SECOND TRIMESTER: Primary | ICD-10-CM

## 2022-05-28 DIAGNOSIS — U07.1 INFECTION DUE TO 2019 NOVEL CORONAVIRUS: ICD-10-CM

## 2022-05-28 LAB
FLUAV RNA SPEC QL NAA+PROBE: NEGATIVE
FLUBV RNA RESP QL NAA+PROBE: NEGATIVE
SARS-COV-2 RNA RESP QL NAA+PROBE: POSITIVE

## 2022-05-28 PROCEDURE — 87636 SARSCOV2 & INF A&B AMP PRB: CPT | Performed by: PHYSICIAN ASSISTANT

## 2022-05-28 PROCEDURE — C9803 HOPD COVID-19 SPEC COLLECT: HCPCS

## 2022-05-28 PROCEDURE — 99283 EMERGENCY DEPT VISIT LOW MDM: CPT | Mod: 25,CS

## 2022-05-28 PROCEDURE — 71046 X-RAY EXAM CHEST 2 VIEWS: CPT

## 2022-05-28 ASSESSMENT — ENCOUNTER SYMPTOMS
NAUSEA: 0
FEVER: 0
COUGH: 1
DYSURIA: 0
ABDOMINAL PAIN: 0
SHORTNESS OF BREATH: 1
JOINT SWELLING: 0

## 2022-05-28 NOTE — ED PROVIDER NOTES
"EMERGENCY DEPARTMENT ENCOUNTER      NAME: Lolis Johnson  AGE: 37 year old female  YOB: 1985  MRN: [id number]  EVALUATION DATE & TIME: 2022 12:30 PM    PCP: Suzi Nathan    ED PROVIDER: Ebony Barraza PA-C      Chief Complaint   Patient presents with     Covid Concern         FINAL IMPRESSION:  1. Infection due to 2019 novel coronavirus          ED COURSE & MEDICAL DECISION MAKIN:58 PM I introduced myself to patient, performed initial HPI and examination.     37 year old female with no pertinent PMH presents to the Emergency Department for evaluation of cough, positive COVID at home. Patient is pregnant (24 weeks), prompting primary concern.  Patient reports she is still feeling active fetal movements and no vaginal bleeding or discharge.    No shortness of breath. Does have burning \"lung\" pain that is constant but worse with taking deep breath and winded with walking.  There was an outbreak of COVID at patient's , daughter tested positive on Thursday.  Patient taken at home COVID test this morning which was positive after symptoms started in the middle of the night.    On examination, patient has T 99F. Mildly tachycardic (), O2 sats 98%.  On exam patient is well-appearing, no respiratory distress.  No wheezing.  No leg pain or swelling.    Presentation is most consistent with COVID.  COVID test is positive here today.  No hypoxia or respiratory distress.  With pleuritic pain, I did consider pulmonary embolism but with 1 day of symptoms with a positive COVID in the setting of COVID exposure, this would not fit with pulmonary embolism and no further work-up was obtained at this time.      Chest x-ray shows no evidence of bacterial pneumonia.    Discussed results with patient.  Encouraged at home supportive management and monitoring with oximeter at home.  Given information for monoclonal antibody treatment.  Instructed on red flags and indications to return to the emergency " department.  All questions were answered to the best my ability and patient is agreeable with plan.      MEDICATIONS GIVEN IN THE EMERGENCY:  Medications - No data to display    NEW PRESCRIPTIONS STARTED AT TODAY'S ER VISIT  Discharge Medication List as of 5/28/2022  2:27 PM             =================================================================    HPI    Patient information was obtained from: Patient    Use of : N/A        Lolis Johnson is a 37 year old female with no pertinent PMH who presents to this ED for evaluation of COVID + and cough.     Patient's daughter had a COVID exposure at  on Monday, she tested positive on Thursday.  Overnight patient developed cough, burning lung pain that she reports is always present, but worse when she takes a deep breath in her room when she is moving around.  No fevers, no abdominal pain, no nausea.  Patient is 24 weeks pregnant, no complications with the pregnancy.  She has been feeling baby move, no vaginal bleeding or discharge.      REVIEW OF SYSTEMS   Review of Systems   Constitutional: Negative for fever.   Respiratory: Positive for cough and shortness of breath.    Cardiovascular: Positive for chest pain.   Gastrointestinal: Negative for abdominal pain and nausea.   Genitourinary: Negative for dysuria, vaginal bleeding and vaginal discharge.   Musculoskeletal: Negative for joint swelling.   Skin: Negative for rash.   All other systems reviewed and are negative.       PAST MEDICAL HISTORY:  Past Medical History:   Diagnosis Date     Infertility, female      Subclinical hypothyroidism 4/29/2016     Varicella        PAST SURGICAL HISTORY:  Past Surgical History:   Procedure Laterality Date     DILATION AND EVACUATION N/A 2/18/2021    Procedure: DILATION AND CURETTAGE, UTERUS;  Surgeon: Christiane Lu MD;  Location: [facility] OR     SINUS SURGERY         CURRENT MEDICATIONS:    levothyroxine (SYNTHROID/LEVOTHROID) 100 MCG tablet  omeprazole  "(PRILOSEC) 40 MG DR capsule  Prenatal Vit-DSS-Fe Cbn-FA (PRENATAL AD PO)        ALLERGIES:  Allergies   Allergen Reactions     Sulfa Drugs      Sulfasalazine Unknown       FAMILY HISTORY:  Family History   Problem Relation Age of Onset     Breast Cancer Paternal Grandmother        SOCIAL HISTORY:   Social History     Socioeconomic History     Marital status:    Tobacco Use     Smoking status: Never Smoker     Smokeless tobacco: Never Used   Vaping Use     Vaping Use: Never used   Substance and Sexual Activity     Alcohol use: Not Currently     Comment: occ     Drug use: No     Sexual activity: Yes     Partners: Male     Birth control/protection: None   Other Topics Concern     Parent/sibling w/ CABG, MI or angioplasty before 65F 55M? No     Social Determinants of Health     Intimate Partner Violence: Not At Risk     Fear of Current or Ex-Partner: No     Emotionally Abused: No     Physically Abused: No     Sexually Abused: No       VITALS:  BP 90/60   Pulse 110   Temp 99  F (37.2  C) (Temporal)   Resp 18   Ht 1.753 m (5' 9\")   Wt 79.4 kg (175 lb)   LMP 12/09/2021   SpO2 98%   BMI 25.84 kg/m      PHYSICAL EXAM    Constitutional: Well developed, Well nourished, NAD, GCS 15   HENT: Normocephalic, Atraumatic, Oropharynx clear  Neck- Supple, Nontender. Normal ROM. No stridor.  Eyes: Conjunctiva normal. PERRL. EOM intact.   Respiratory: No respiratory distress, speaking in full sentences. Normal breath sounds, No wheezing  Cardiovascular: Tachycardic, Regular rhythm, No murmurs. Chest wall nontender.    Musculoskeletal: No deformities, Moves all extremities equally. No calf tenderness or swelling.   Integument: Warm, Dry, No erythema, ecchymosis, or rash.  Neurologic: Alert & oriented x 3, Normal sensory function. No focal deficits.   Psychiatric: Affect normal, Judgment normal, Mood normal. Cooperative.      LAB:  All pertinent labs reviewed and interpreted.  Results for orders placed or performed during " the hospital encounter of 05/28/22   Chest XR,  PA & LAT    Impression    IMPRESSION: Negative chest.   Symptomatic; Yes; 5/28/2022 Influenza A/B & SARS-CoV2 (COVID-19) Virus PCR Multiplex Nasopharyngeal    Specimen: Nasopharyngeal; Swab   Result Value Ref Range    Influenza A PCR Negative Negative    Influenza B PCR Negative Negative    SARS CoV2 PCR Positive (A) Negative       RADIOLOGY:  Reviewed all pertinent imaging. Please see official radiology report.  Chest XR,  PA & LAT   Final Result   IMPRESSION: Negative chest.          EKG:    None    PROCEDURES:   None          Ebony Barraza PA-C  Emergency Medicine  [facility] EMERGENCY DEPARTMENT  [address] [place] [id number]  [phone]             Ebony Barraza PA-C  05/28/22 1510

## 2022-05-28 NOTE — TELEPHONE ENCOUNTER
Triage Call:     Pt tested positive for COVID-19 today  Pt is 24 weeks pregnant    Sx she is reporting:   Cough   Burning, pressure, and heaviness pain in her chest since 4am    Difficult time breathing; speaking in full sentences and does not feel like she is struggling for each breath, but states she can not take a deep breath without coughing     Disposition: 911. Pt reports she might have her  drive her to the ED instead. Writer gave the recommended care advice per her answers and protocol.     Reason for Disposition    [1] Chest pain lasts > 5 minutes AND [2] described as crushing, pressure-like, or heavy    Additional Information    Negative: SEVERE difficulty breathing (e.g., struggling for each breath, speaks in single words)    Negative: Difficult to awaken or acting confused (e.g., disoriented, slurred speech)    Negative: Shock suspected (e.g., cold/pale/clammy skin, too weak to stand, low BP, rapid pulse)    Negative: Passed out (i.e., fainted, collapsed and was not responding)    Negative: [1] Chest pain lasts > 5 minutes AND [2] age > 44    Negative: [1] Chest pain lasts > 5 minutes AND [2] age > 30 AND [3] one or more cardiac risk factors (e.g., diabetes, high blood pressure, high cholesterol, smoker, or strong family history of heart disease)    Negative: [1] Chest pain lasts > 5 minutes AND [2] history of heart disease (i.e., angina, heart attack, heart failure, bypass surgery, takes nitroglycerin)    Negative: Visible sweat on face or sweat dripping down face    Protocols used: CHEST PAIN-A-    Danisha Brown RN  [facility] Nurse Advisor 10:30 AM 5/28/2022

## 2022-05-28 NOTE — Clinical Note
Lolis Johnson was seen and treated in our emergency department on 5/28/2022.  She may return to work on 06/04/2022.       If you have any questions or concerns, please don't hesitate to call.      Ebony Barraza PA-C

## 2022-05-28 NOTE — DISCHARGE INSTRUCTIONS
You did test POSITIVE for COVID today.   Chest x-ray is reassuring.    Get an oximeter (over the counter at [employer]) to monitor your oxygen. Anything >91% is reassuring.    You will need to quarantine at home for the next 10 days.   Follow up in clinic next week (Virtual visit is OK) for re-check.     If you are interested in potential monoclonal antibodies, you can fill out this questionnaire: [email or web address]    Return to the emergency department if you develop worsening shortness of breath, chest pain, Oxygen saturations less than 91%, or any other concerning symptoms. We would be happy to see you.

## 2022-05-28 NOTE — ED TRIAGE NOTES
"Patient stated she tested positive for covid this am and is concerned because she is 24 weeks pregnant. C/o \"lung\" pain. Pain worse with deep inspirations.     Triage Assessment     Row Name 05/28/22 1201       Triage Assessment (Adult)    Airway WDL WDL       Respiratory WDL    Respiratory WDL WDL       Skin Circulation/Temperature WDL    Skin Circulation/Temperature WDL WDL       Cardiac WDL    Cardiac WDL WDL       Peripheral/Neurovascular WDL    Peripheral Neurovascular WDL WDL       Cognitive/Neuro/Behavioral WDL    Cognitive/Neuro/Behavioral WDL WDL              "

## 2022-06-06 DIAGNOSIS — E03.8 SUBCLINICAL HYPOTHYROIDISM: ICD-10-CM

## 2022-06-06 RX ORDER — LEVOTHYROXINE SODIUM 100 UG/1
100 TABLET ORAL DAILY
Qty: 30 TABLET | Refills: 0 | Status: SHIPPED | OUTPATIENT
Start: 2022-06-06 | End: 2022-07-07

## 2022-06-06 NOTE — TELEPHONE ENCOUNTER
Refill request for levothyroxine. Prenatal patient, last tsh labs February 2022. Upcoming visit and labs are ordered, short refill sent.     Akua ESTRADA RN

## 2022-06-16 ENCOUNTER — PRENATAL OFFICE VISIT (OUTPATIENT)
Dept: OBGYN | Facility: CLINIC | Age: 37
End: 2022-06-16
Payer: COMMERCIAL

## 2022-06-16 VITALS
WEIGHT: 184 LBS | SYSTOLIC BLOOD PRESSURE: 102 MMHG | DIASTOLIC BLOOD PRESSURE: 61 MMHG | BODY MASS INDEX: 27.17 KG/M2 | HEART RATE: 96 BPM | TEMPERATURE: 98.4 F

## 2022-06-16 DIAGNOSIS — O09.529 ENCOUNTER FOR SUPERVISION OF MULTIGRAVIDA WITH ADVANCED MATERNAL AGE: ICD-10-CM

## 2022-06-16 DIAGNOSIS — R73.09 ABNORMAL GLUCOSE TOLERANCE TEST: Primary | ICD-10-CM

## 2022-06-16 LAB
GLUCOSE 1H P 50 G GLC PO SERPL-MCNC: 133 MG/DL (ref 70–129)
HGB BLD-MCNC: 11.8 G/DL (ref 11.7–15.7)

## 2022-06-16 PROCEDURE — 84443 ASSAY THYROID STIM HORMONE: CPT | Performed by: OBSTETRICS & GYNECOLOGY

## 2022-06-16 PROCEDURE — 82950 GLUCOSE TEST: CPT | Performed by: OBSTETRICS & GYNECOLOGY

## 2022-06-16 PROCEDURE — 99207 PR PRENATAL VISIT: CPT | Performed by: OBSTETRICS & GYNECOLOGY

## 2022-06-16 PROCEDURE — 36415 COLL VENOUS BLD VENIPUNCTURE: CPT | Performed by: OBSTETRICS & GYNECOLOGY

## 2022-06-16 NOTE — PROGRESS NOTES
Doing well.   Had covid, recovered quickly with paxlovid.   Good fetal movement.   GERD is intermittent. Depends on food.   GCT, hgb, TSH today.     Childbirth classes? NO  Plan on breastfeeding? Yes: Has a pump.   Birthcontrol? Unsure, will think about it. Wasn't expecting spontaneous conception.  Sex on ultrasound? girl  Circumsion? NA  Peds doc? Malvin Gorman

## 2022-06-17 LAB — TSH SERPL DL<=0.005 MIU/L-ACNC: 1.91 MU/L (ref 0.4–4)

## 2022-06-23 ENCOUNTER — LAB (OUTPATIENT)
Dept: LAB | Facility: CLINIC | Age: 37
End: 2022-06-23
Payer: COMMERCIAL

## 2022-06-23 DIAGNOSIS — R73.09 ABNORMAL GLUCOSE TOLERANCE TEST: ICD-10-CM

## 2022-06-23 PROCEDURE — 82951 GLUCOSE TOLERANCE TEST (GTT): CPT

## 2022-06-23 PROCEDURE — 36415 COLL VENOUS BLD VENIPUNCTURE: CPT

## 2022-06-23 PROCEDURE — 82952 GTT-ADDED SAMPLES: CPT

## 2022-06-24 LAB
GESTATIONAL GTT 1 HR POST DOSE: 134 MG/DL (ref 60–179)
GESTATIONAL GTT 2 HR POST DOSE: 126 MG/DL (ref 60–154)
GESTATIONAL GTT 3 HR POST DOSE: 65 MG/DL (ref 60–139)
GLUCOSE P FAST SERPL-MCNC: 83 MG/DL (ref 60–94)

## 2022-06-30 ENCOUNTER — VIRTUAL VISIT (OUTPATIENT)
Dept: FAMILY MEDICINE | Facility: CLINIC | Age: 37
End: 2022-06-30
Payer: COMMERCIAL

## 2022-06-30 DIAGNOSIS — J45.30 MILD PERSISTENT ASTHMA, UNSPECIFIED WHETHER COMPLICATED: ICD-10-CM

## 2022-06-30 DIAGNOSIS — Z33.1 PREGNANCY, INCIDENTAL: ICD-10-CM

## 2022-06-30 DIAGNOSIS — K21.00 GASTROESOPHAGEAL REFLUX DISEASE WITH ESOPHAGITIS WITHOUT HEMORRHAGE: Primary | ICD-10-CM

## 2022-06-30 PROCEDURE — 99214 OFFICE O/P EST MOD 30 MIN: CPT | Mod: 95 | Performed by: FAMILY MEDICINE

## 2022-06-30 RX ORDER — FLUTICASONE PROPIONATE 110 UG/1
1 AEROSOL, METERED RESPIRATORY (INHALATION) 2 TIMES DAILY
Qty: 12 G | Refills: 1 | Status: SHIPPED | OUTPATIENT
Start: 2022-06-30 | End: 2022-10-27

## 2022-06-30 RX ORDER — SUCRALFATE 1 G/1
1 TABLET ORAL 4 TIMES DAILY
Qty: 40 TABLET | Refills: 1 | Status: SHIPPED | OUTPATIENT
Start: 2022-06-30 | End: 2022-07-27

## 2022-06-30 RX ORDER — ALBUTEROL SULFATE 90 UG/1
2 AEROSOL, METERED RESPIRATORY (INHALATION) EVERY 4 HOURS PRN
COMMUNITY
Start: 2022-06-29 | End: 2023-11-30

## 2022-06-30 NOTE — PROGRESS NOTES
"Lolis is a 37 year old who is being evaluated via a billable video visit.      How would you like to obtain your AVS? MyChart  If the video visit is dropped, the invitation should be resent by: Text to cell phone: [phone]  Will anyone else be joining your video visit? No          Assessment & Plan     Gastroesophageal reflux disease with esophagitis without hemorrhage    Patient to continue omeprazole 40 mg daily and add Carafate 4 times daily for 7 to 10 days.    - sucralfate (CARAFATE) 1 GM tablet; Take 1 tablet (1 g) by mouth 4 times daily    Pregnancy, incidental  Managed by OB/GYN     Mild persistent asthma, unspecified whether complicated  Will start Flovent 1 puff twice daily and continue albuterol every 4 hours as needed for cough and wheeze.  - fluticasone (FLOVENT HFA) 110 MCG/ACT inhaler; Inhale 1 puff into the lungs 2 times daily    30 minutes spent on the date of the encounter doing chart review, history and exam, documentation and further activities per the note    Follow-up with OB/GYN in 2 weeks sooner if not improving.     BMI:   Estimated body mass index is 27.17 kg/m  as calculated from the following:    Height as of 5/28/22: 1.753 m (5' 9\").    Weight as of 6/16/22: 83.5 kg (184 lb).       No follow-ups on file.    Suzi Nathan DO  [facility]    Subjective   Lolis is a 37 year old, presenting for the following health issues:  Gastrophageal Reflux and Cough      History of Present Illness       Reason for visit:  My OB, Dr. uL, suggested I meet with a family pratice doctor to talk about my Acid Reflux, wheezing, coughing since I am 29 week pregnant  Symptom onset:  1-2 weeks ago  Symptoms include:  Coughing, Wheezing  Symptom intensity:  Moderate  Symptom progression:  Staying the same  Had these symptoms before:  Yes  Has tried/received treatment for these symptoms:  Yes  Previous treatment was successful:  Yes  Prior treatment description:  Inhaler - but symtoms " seem to be worse with pregnancy  What makes it worse:  Sleeping on my back, stressful situations, worse as day goes on afternoon to early evening  What makes it better:  Inhaler    She eats 4 or more servings of fruits and vegetables daily.She consumes 0 sweetened beverage(s) daily.She exercises with enough effort to increase her heart rate 30 to 60 minutes per day.  She exercises with enough effort to increase her heart rate 5 days per week.   She is taking medications regularly.       GERD/Heartburn  Onset/Duration: Few weeks  Description: Possible acid reflux?   Intensity: mild, moderate  Progression of Symptoms: same  Accompanying Signs & Symptoms:  Does it feel like food gets stuck or trouble swallowing: no  Nausea: no  Vomiting (bloody?): no  Abdominal Pain: no  Black-Tarry stools: no  Bloody stools: no  History:  Previous similar episodes: no  Previous ulcers: no  Precipitating factors:   Caffeine use: YES  Alcohol use: no  NSAID/Aspirin use: no  Tobacco use: no  Worse with if eating after 7pm, tomatoes/ketchup.  Alleviating factors: None  Therapies tried and outcome:             Lifestyle changes:              Medications: Omeprazole (Prilosec), Albuterol Inhaler- a little relief    She didn't have gerd prior to pregnancy or with earlier pregnancy   Early in pregnancy she started with metallic taste and turned into a burning pain.  She has adjusted her diet and started omeprazole.  She was doing well without any symptoms with [facility] diet changes and omeprazole.   She ate some thing about 3 weeks ago that worsened it and now left with a cough.  The burning of gerd was only worse a few days.  Her chest is really sore from coughing.  It is daily and worse in the afternoon and evening.       She has a history of allergy induced asthma.  She is in her allergy season and taking zyretc.  She noticing the wheeze with exercise pre pregnancy and with stress.  She has been wheezing almost daily at night for the last week  now.   She started the albuterol a few days ago.  She is taking it every 4 hours.      She is sleeping in a recliner due to cough the last 2 weeks.      She had covid in may which didn't cause lasting symptoms.      Review of Systems   Constitutional, HEENT, cardiovascular, pulmonary, gi and gu systems are negative, except as otherwise noted.      Objective           Vitals:  No vitals were obtained today due to virtual visit.    Physical Exam   GENERAL: Healthy, alert and no distress  EYES: Eyes grossly normal to inspection.  No discharge or erythema, or obvious scleral/conjunctival abnormalities.  RESP: No audible wheeze, cough, or visible cyanosis.  No visible retractions or increased work of breathing.    SKIN: Visible skin clear. No significant rash, abnormal pigmentation or lesions.  NEURO: Cranial nerves grossly intact.  Mentation and speech appropriate for age.  PSYCH: Mentation appears normal, affect normal/bright, judgement and insight intact, normal speech and appearance well-groomed.          Video-Visit Details    Video Start Time: 11 AM    Type of service:  Video Visit    Video End Time:11:25 AM    Originating Location (pt. Location): Home    Distant Location (provider location):  [facility]     Platform used for Video Visit: [employer]    .  ..

## 2022-07-07 ENCOUNTER — TELEPHONE (OUTPATIENT)
Dept: OBGYN | Facility: CLINIC | Age: 37
End: 2022-07-07

## 2022-07-07 DIAGNOSIS — E03.8 SUBCLINICAL HYPOTHYROIDISM: ICD-10-CM

## 2022-07-07 RX ORDER — LEVOTHYROXINE SODIUM 100 UG/1
100 TABLET ORAL DAILY
Qty: 90 TABLET | Refills: 1 | Status: SHIPPED | OUTPATIENT
Start: 2022-07-07 | End: 2023-01-13

## 2022-07-08 DIAGNOSIS — E03.8 SUBCLINICAL HYPOTHYROIDISM: ICD-10-CM

## 2022-07-08 RX ORDER — LEVOTHYROXINE SODIUM 100 UG/1
100 TABLET ORAL DAILY
Qty: 90 TABLET | Refills: 1 | Status: CANCELLED | OUTPATIENT
Start: 2022-07-08

## 2022-07-09 ENCOUNTER — HEALTH MAINTENANCE LETTER (OUTPATIENT)
Age: 37
End: 2022-07-09

## 2022-07-14 ENCOUNTER — PRENATAL OFFICE VISIT (OUTPATIENT)
Dept: OBGYN | Facility: CLINIC | Age: 37
End: 2022-07-14
Payer: COMMERCIAL

## 2022-07-14 VITALS
SYSTOLIC BLOOD PRESSURE: 97 MMHG | HEART RATE: 86 BPM | WEIGHT: 186 LBS | TEMPERATURE: 98.1 F | BODY MASS INDEX: 27.47 KG/M2 | DIASTOLIC BLOOD PRESSURE: 61 MMHG

## 2022-07-14 DIAGNOSIS — Z23 NEED FOR TDAP VACCINATION: Primary | ICD-10-CM

## 2022-07-14 PROCEDURE — 90471 IMMUNIZATION ADMIN: CPT | Performed by: OBSTETRICS & GYNECOLOGY

## 2022-07-14 PROCEDURE — 99207 PR PRENATAL VISIT: CPT | Performed by: OBSTETRICS & GYNECOLOGY

## 2022-07-14 PROCEDURE — 90715 TDAP VACCINE 7 YRS/> IM: CPT | Performed by: OBSTETRICS & GYNECOLOGY

## 2022-07-14 ASSESSMENT — ASTHMA QUESTIONNAIRES
ACT_TOTALSCORE: 15
ACUTE_EXACERBATION_TODAY: NO
QUESTION_1 LAST FOUR WEEKS HOW MUCH OF THE TIME DID YOUR ASTHMA KEEP YOU FROM GETTING AS MUCH DONE AT WORK, SCHOOL OR AT HOME: NONE OF THE TIME
ACT_TOTALSCORE: 15
QUESTION_2 LAST FOUR WEEKS HOW OFTEN HAVE YOU HAD SHORTNESS OF BREATH: MORE THAN ONCE A DAY
QUESTION_3 LAST FOUR WEEKS HOW OFTEN DID YOUR ASTHMA SYMPTOMS (WHEEZING, COUGHING, SHORTNESS OF BREATH, CHEST TIGHTNESS OR PAIN) WAKE YOU UP AT NIGHT OR EARLIER THAN USUAL IN THE MORNING: NOT AT ALL
QUESTION_4 LAST FOUR WEEKS HOW OFTEN HAVE YOU USED YOUR RESCUE INHALER OR NEBULIZER MEDICATION (SUCH AS ALBUTEROL): THREE OR MORE TIMES PER DAY
QUESTION_5 LAST FOUR WEEKS HOW WOULD YOU RATE YOUR ASTHMA CONTROL: SOMEWHAT CONTROLLED

## 2022-07-16 NOTE — PROGRESS NOTES
Doing well.   Good fetal movement.   GERD much resolved after carafate.   TDaP today.   RTC 2 weeks

## 2022-07-27 ENCOUNTER — PRENATAL OFFICE VISIT (OUTPATIENT)
Dept: OBGYN | Facility: CLINIC | Age: 37
End: 2022-07-27
Payer: COMMERCIAL

## 2022-07-27 VITALS
BODY MASS INDEX: 28.12 KG/M2 | SYSTOLIC BLOOD PRESSURE: 102 MMHG | WEIGHT: 190.4 LBS | DIASTOLIC BLOOD PRESSURE: 62 MMHG | HEART RATE: 94 BPM | OXYGEN SATURATION: 99 %

## 2022-07-27 DIAGNOSIS — U07.1 COVID-19 AFFECTING PREGNANCY IN SECOND TRIMESTER: ICD-10-CM

## 2022-07-27 DIAGNOSIS — Z34.83 ENCOUNTER FOR SUPERVISION OF OTHER NORMAL PREGNANCY IN THIRD TRIMESTER: Primary | ICD-10-CM

## 2022-07-27 DIAGNOSIS — O98.512 COVID-19 AFFECTING PREGNANCY IN SECOND TRIMESTER: ICD-10-CM

## 2022-07-27 PROCEDURE — 99207 PR PRENATAL VISIT: CPT | Performed by: OBSTETRICS & GYNECOLOGY

## 2022-08-11 ENCOUNTER — PRENATAL OFFICE VISIT (OUTPATIENT)
Dept: OBGYN | Facility: CLINIC | Age: 37
End: 2022-08-11
Payer: COMMERCIAL

## 2022-08-11 VITALS
WEIGHT: 194 LBS | SYSTOLIC BLOOD PRESSURE: 108 MMHG | HEART RATE: 98 BPM | DIASTOLIC BLOOD PRESSURE: 70 MMHG | BODY MASS INDEX: 28.65 KG/M2

## 2022-08-11 DIAGNOSIS — Z34.83 ENCOUNTER FOR SUPERVISION OF OTHER NORMAL PREGNANCY IN THIRD TRIMESTER: Primary | ICD-10-CM

## 2022-08-11 PROCEDURE — 99207 PR PRENATAL VISIT: CPT | Performed by: OBSTETRICS & GYNECOLOGY

## 2022-08-11 NOTE — PROGRESS NOTES
Doing well.   Good fetal movement.   Mild URI right now.  GBS, hgb, TSH next visit.   Growth US tomorrow.   RTC weekly

## 2022-08-12 ENCOUNTER — ANCILLARY PROCEDURE (OUTPATIENT)
Dept: ULTRASOUND IMAGING | Facility: CLINIC | Age: 37
End: 2022-08-12
Attending: OBSTETRICS & GYNECOLOGY
Payer: COMMERCIAL

## 2022-08-12 DIAGNOSIS — O98.512 COVID-19 AFFECTING PREGNANCY IN SECOND TRIMESTER: ICD-10-CM

## 2022-08-12 DIAGNOSIS — U07.1 COVID-19 AFFECTING PREGNANCY IN SECOND TRIMESTER: ICD-10-CM

## 2022-08-12 PROBLEM — O35.EXX0 PYELECTASIS OF FETUS ON PRENATAL ULTRASOUND: Status: ACTIVE | Noted: 2022-08-12

## 2022-08-12 PROCEDURE — 76816 OB US FOLLOW-UP PER FETUS: CPT | Performed by: OBSTETRICS & GYNECOLOGY

## 2022-08-25 ENCOUNTER — PRENATAL OFFICE VISIT (OUTPATIENT)
Dept: OBGYN | Facility: CLINIC | Age: 37
End: 2022-08-25
Payer: COMMERCIAL

## 2022-08-25 VITALS
BODY MASS INDEX: 29.24 KG/M2 | HEART RATE: 82 BPM | WEIGHT: 198 LBS | TEMPERATURE: 97.4 F | DIASTOLIC BLOOD PRESSURE: 62 MMHG | SYSTOLIC BLOOD PRESSURE: 104 MMHG

## 2022-08-25 DIAGNOSIS — E03.8 SUBCLINICAL HYPOTHYROIDISM: ICD-10-CM

## 2022-08-25 DIAGNOSIS — Z34.83 ENCOUNTER FOR SUPERVISION OF OTHER NORMAL PREGNANCY IN THIRD TRIMESTER: Primary | ICD-10-CM

## 2022-08-25 DIAGNOSIS — K21.00 GASTROESOPHAGEAL REFLUX DISEASE WITH ESOPHAGITIS WITHOUT HEMORRHAGE: ICD-10-CM

## 2022-08-25 LAB — HGB BLD-MCNC: 12.1 G/DL (ref 11.7–15.7)

## 2022-08-25 PROCEDURE — 84443 ASSAY THYROID STIM HORMONE: CPT | Performed by: OBSTETRICS & GYNECOLOGY

## 2022-08-25 PROCEDURE — 36415 COLL VENOUS BLD VENIPUNCTURE: CPT | Performed by: OBSTETRICS & GYNECOLOGY

## 2022-08-25 PROCEDURE — 99207 PR PRENATAL VISIT: CPT | Performed by: OBSTETRICS & GYNECOLOGY

## 2022-08-25 PROCEDURE — 87653 STREP B DNA AMP PROBE: CPT | Performed by: OBSTETRICS & GYNECOLOGY

## 2022-08-25 RX ORDER — OMEPRAZOLE 40 MG/1
40 CAPSULE, DELAYED RELEASE ORAL DAILY
Qty: 60 CAPSULE | Refills: 1 | Status: SHIPPED | OUTPATIENT
Start: 2022-08-25 | End: 2022-10-27

## 2022-08-26 PROBLEM — Z23 NEED FOR TDAP VACCINATION: Status: RESOLVED | Noted: 2022-01-20 | Resolved: 2022-08-26

## 2022-08-26 NOTE — PROGRESS NOTES
Doing well.   Good fetal movement.   GBS, hgb, TSH.  Having LE edema, progressive throughout the day.   Growth US last week:  Biometry:  BPD 9.3 cm 37w5d 97.5%   HC 33.8 cm 38w6d 93.8%   AC 32.1 cm 36w0d 80.8%   FL 6.6 cm 34w0d 16.9%   EFW (lbs/oz) 6 lbs               3ozs       EFW (g) 2820 g 71.1%        Fetal heart rate: 142 bpm  Fetal presentation: Cephalic  Amniotic fluid: 6.3cm MVP    RTC weekly

## 2022-08-27 LAB
GP B STREP DNA SPEC QL NAA+PROBE: NEGATIVE
TSH SERPL DL<=0.005 MIU/L-ACNC: 1.76 MU/L (ref 0.4–4)

## 2022-08-31 ENCOUNTER — PRENATAL OFFICE VISIT (OUTPATIENT)
Dept: OBGYN | Facility: CLINIC | Age: 37
End: 2022-08-31
Payer: COMMERCIAL

## 2022-08-31 VITALS
WEIGHT: 201 LBS | SYSTOLIC BLOOD PRESSURE: 102 MMHG | HEART RATE: 96 BPM | DIASTOLIC BLOOD PRESSURE: 64 MMHG | BODY MASS INDEX: 29.68 KG/M2 | OXYGEN SATURATION: 98 %

## 2022-08-31 DIAGNOSIS — O09.523 AMA (ADVANCED MATERNAL AGE) MULTIGRAVIDA 35+, THIRD TRIMESTER: Primary | ICD-10-CM

## 2022-08-31 PROCEDURE — 99207 PR PRENATAL VISIT: CPT | Performed by: OBSTETRICS & GYNECOLOGY

## 2022-08-31 NOTE — PROGRESS NOTES
Patient states she is having random contractions.  No vaginal bleeding or leaking fluid. Normal fetal movement.  Denies any headache, change in vision, chest pain, chest pressure, shortness of breath.  Has been on her feet most of the day, some increased edema.  On exam 1+ edema, bilateral.  Left slightly more than right.  No calf tenderness bilaterally.  Discussed option for IOL at 39w0d for AMA.  Patient declines at this time, but will let us know if she changes her mind.  Next visit on 9/8/22 with Dr. Lu.  Reviewed s/sx of labor and ROM.    Lizeth Blanchard MD

## 2022-09-04 ENCOUNTER — HEALTH MAINTENANCE LETTER (OUTPATIENT)
Age: 37
End: 2022-09-04

## 2022-09-08 ENCOUNTER — PRENATAL OFFICE VISIT (OUTPATIENT)
Dept: OBGYN | Facility: CLINIC | Age: 37
End: 2022-09-08
Payer: COMMERCIAL

## 2022-09-08 VITALS
DIASTOLIC BLOOD PRESSURE: 75 MMHG | TEMPERATURE: 97 F | SYSTOLIC BLOOD PRESSURE: 118 MMHG | BODY MASS INDEX: 29.98 KG/M2 | WEIGHT: 203 LBS | HEART RATE: 102 BPM

## 2022-09-08 DIAGNOSIS — Z11.59 SCREENING FOR VIRAL DISEASE: Primary | ICD-10-CM

## 2022-09-08 LAB — SARS-COV-2 RNA RESP QL NAA+PROBE: POSITIVE

## 2022-09-08 PROCEDURE — U0005 INFEC AGEN DETEC AMPLI PROBE: HCPCS | Performed by: OBSTETRICS & GYNECOLOGY

## 2022-09-08 PROCEDURE — U0003 INFECTIOUS AGENT DETECTION BY NUCLEIC ACID (DNA OR RNA); SEVERE ACUTE RESPIRATORY SYNDROME CORONAVIRUS 2 (SARS-COV-2) (CORONAVIRUS DISEASE [COVID-19]), AMPLIFIED PROBE TECHNIQUE, MAKING USE OF HIGH THROUGHPUT TECHNOLOGIES AS DESCRIBED BY CMS-2020-01-R: HCPCS | Performed by: OBSTETRICS & GYNECOLOGY

## 2022-09-08 PROCEDURE — 99207 PR PRENATAL VISIT: CPT | Performed by: OBSTETRICS & GYNECOLOGY

## 2022-09-08 NOTE — PROGRESS NOTES
Doing ok.   Good fetal movement.   More bilateral LE edema.   Cervix favorable.   Discussed pros/cons of IOL for AMA, will consider. COVID test today in case she elects to proceed with IOL.   Otherwise, RTC one week.

## 2022-09-09 ENCOUNTER — TELEPHONE (OUTPATIENT)
Dept: NURSING | Facility: CLINIC | Age: 37
End: 2022-09-09

## 2022-09-09 NOTE — TELEPHONE ENCOUNTER
Coronavirus (COVID-19) Notification    Caller Name (Patient, parent, daughter/son, grandparent, etc)  Lolis    Reason for call  Notify of Positive Coronavirus (COVID-19) lab results, assess symptoms,  review [facility] recommendations    Lab Result    Lab test:  2019-nCoV rRt-PCR or SARS-CoV-2 PCR    Oropharyngeal AND/OR nasopharyngeal swabs is POSITIVE for 2019-nCoV RNA/SARS-COV-2 PCR (COVID-19 virus)      Gather patient reported symptoms   Assessment   Current Symptoms at time of phone call, reported by patient: (if no symptoms, document: No symptoms] No   Date of symptom(s) onset (if applicable) NA     If at time of call, Patients symptoms have worsened, the Patient should contact 911 or have someone drive them to Emergency Dept promptly:      If Patient calling 911, inform 911 personal that you have tested positive for the Coronavirus (COVID-19).  Place mask on and await 911 to arrive.    If Emergency Dept, If possible, please have another adult drive you to the Emergency Dept but you need to wear mask when in contact with other people.      Treatment Options:   Patient classified as COVID treatment eligible by Epic high risk algorithm: No  You may be eligible to receive a new treatment with a monoclonal antibody for preventing hospitalization in patients at high risk for complications from COVID-19.  This medication is still experimental and available on a limited basis; it is given through an IV and must be given at an infusion center.  Please note that not all people who are eligible will receive the medication since it is in limited supply.   Is the patient symptomatic and onset of symptoms within the last 7 days? No. Patient does not qualify.    Review information with Patient    Your result was positive. This means you have COVID-19 (coronavirus).    How can I protect others?    These guidelines are for isolating before returning to work, school or .    If you DO have symptoms    Stay home and  away from others     For at least 5 days after your symptoms started, AND    You are fever free for 24 hours (with no medicine that reduces fever), AND    Your other symptoms are better    Wear a mask for 10 full days anytime you are around others    If you DON'T have symptoms    Stay home and away from others for at least 5 days after your positive test    Wear a mask for 10 full days anytime you are around others    There may be different guidelines for healthcare facilities.  Please check with the specific sites before arriving.    If you have been told by a doctor that you were severely ill with COVID-19 or are immunocompromised, you should isolate for at least 10 days.    You should not go back to work until you meet the guidelines above for ending your home isolation. You don't need to be retested for COVID-19 before going back to work--studies show that you won't spread the virus if it's been at least 10 days since your symptoms started (or 20 days, if you have a weak immune system).    Employers, schools, and daycares: This is an official notice for this person's medical guidelines for returning in-person.  They must meet the above guidelines before going back to work, school or  in person.    You will receive a positive COVID-19 letter via [employer] or the mail soon with additional self-care information.    Would you like me to review some of that information with you now?  No    If you were tested for an upcoming procedure, please contact your provider for next steps.    Janelle Esparza

## 2022-09-10 ENCOUNTER — ANESTHESIA (OUTPATIENT)
Dept: OBGYN | Facility: CLINIC | Age: 37
End: 2022-09-10
Payer: COMMERCIAL

## 2022-09-10 ENCOUNTER — ANESTHESIA EVENT (OUTPATIENT)
Dept: OBGYN | Facility: CLINIC | Age: 37
End: 2022-09-10
Payer: COMMERCIAL

## 2022-09-10 ENCOUNTER — HOSPITAL ENCOUNTER (INPATIENT)
Facility: CLINIC | Age: 37
LOS: 2 days | Discharge: HOME HEALTH | End: 2022-09-12
Attending: OBSTETRICS & GYNECOLOGY | Admitting: OBSTETRICS & GYNECOLOGY
Payer: COMMERCIAL

## 2022-09-10 LAB
ABO/RH(D): NORMAL
ANTIBODY SCREEN: NEGATIVE
ERYTHROCYTE [DISTWIDTH] IN BLOOD BY AUTOMATED COUNT: 13.2 % (ref 10–15)
HCT VFR BLD AUTO: 34.8 % (ref 35–47)
HGB BLD-MCNC: 11.6 G/DL (ref 11.7–15.7)
MCH RBC QN AUTO: 31.3 PG (ref 26.5–33)
MCHC RBC AUTO-ENTMCNC: 33.3 G/DL (ref 31.5–36.5)
MCV RBC AUTO: 94 FL (ref 78–100)
PLATELET # BLD AUTO: 168 10E3/UL (ref 150–450)
RBC # BLD AUTO: 3.71 10E6/UL (ref 3.8–5.2)
SPECIMEN EXPIRATION DATE: NORMAL
WBC # BLD AUTO: 8.7 10E3/UL (ref 4–11)

## 2022-09-10 PROCEDURE — 3E0R3BZ INTRODUCTION OF ANESTHETIC AGENT INTO SPINAL CANAL, PERCUTANEOUS APPROACH: ICD-10-PCS | Performed by: STUDENT IN AN ORGANIZED HEALTH CARE EDUCATION/TRAINING PROGRAM

## 2022-09-10 PROCEDURE — 0KQM0ZZ REPAIR PERINEUM MUSCLE, OPEN APPROACH: ICD-10-PCS | Performed by: OBSTETRICS & GYNECOLOGY

## 2022-09-10 PROCEDURE — 258N000003 HC RX IP 258 OP 636: Performed by: STUDENT IN AN ORGANIZED HEALTH CARE EDUCATION/TRAINING PROGRAM

## 2022-09-10 PROCEDURE — 250N000013 HC RX MED GY IP 250 OP 250 PS 637: Performed by: STUDENT IN AN ORGANIZED HEALTH CARE EDUCATION/TRAINING PROGRAM

## 2022-09-10 PROCEDURE — 722N000001 HC LABOR CARE VAGINAL DELIVERY SINGLE

## 2022-09-10 PROCEDURE — 250N000011 HC RX IP 250 OP 636: Performed by: STUDENT IN AN ORGANIZED HEALTH CARE EDUCATION/TRAINING PROGRAM

## 2022-09-10 PROCEDURE — 250N000009 HC RX 250

## 2022-09-10 PROCEDURE — 86901 BLOOD TYPING SEROLOGIC RH(D): CPT | Performed by: STUDENT IN AN ORGANIZED HEALTH CARE EDUCATION/TRAINING PROGRAM

## 2022-09-10 PROCEDURE — 250N000013 HC RX MED GY IP 250 OP 250 PS 637

## 2022-09-10 PROCEDURE — 86780 TREPONEMA PALLIDUM: CPT | Performed by: STUDENT IN AN ORGANIZED HEALTH CARE EDUCATION/TRAINING PROGRAM

## 2022-09-10 PROCEDURE — 00HU33Z INSERTION OF INFUSION DEVICE INTO SPINAL CANAL, PERCUTANEOUS APPROACH: ICD-10-PCS | Performed by: STUDENT IN AN ORGANIZED HEALTH CARE EDUCATION/TRAINING PROGRAM

## 2022-09-10 PROCEDURE — 250N000011 HC RX IP 250 OP 636

## 2022-09-10 PROCEDURE — 85014 HEMATOCRIT: CPT | Performed by: STUDENT IN AN ORGANIZED HEALTH CARE EDUCATION/TRAINING PROGRAM

## 2022-09-10 PROCEDURE — 59400 OBSTETRICAL CARE: CPT | Mod: GC | Performed by: OBSTETRICS & GYNECOLOGY

## 2022-09-10 PROCEDURE — 86850 RBC ANTIBODY SCREEN: CPT | Performed by: STUDENT IN AN ORGANIZED HEALTH CARE EDUCATION/TRAINING PROGRAM

## 2022-09-10 PROCEDURE — 250N000009 HC RX 250: Performed by: STUDENT IN AN ORGANIZED HEALTH CARE EDUCATION/TRAINING PROGRAM

## 2022-09-10 PROCEDURE — 120N000002 HC R&B MED SURG/OB UMMC

## 2022-09-10 PROCEDURE — 370N000003 HC ANESTHESIA WARD SERVICE

## 2022-09-10 RX ORDER — NALOXONE HYDROCHLORIDE 0.4 MG/ML
0.4 INJECTION, SOLUTION INTRAMUSCULAR; INTRAVENOUS; SUBCUTANEOUS
Status: DISCONTINUED | OUTPATIENT
Start: 2022-09-10 | End: 2022-09-10 | Stop reason: HOSPADM

## 2022-09-10 RX ORDER — SODIUM CHLORIDE, SODIUM LACTATE, POTASSIUM CHLORIDE, CALCIUM CHLORIDE 600; 310; 30; 20 MG/100ML; MG/100ML; MG/100ML; MG/100ML
INJECTION, SOLUTION INTRAVENOUS CONTINUOUS
Status: DISCONTINUED | OUTPATIENT
Start: 2022-09-10 | End: 2022-09-10 | Stop reason: HOSPADM

## 2022-09-10 RX ORDER — OXYTOCIN/0.9 % SODIUM CHLORIDE 30/500 ML
340 PLASTIC BAG, INJECTION (ML) INTRAVENOUS CONTINUOUS PRN
Status: DISCONTINUED | OUTPATIENT
Start: 2022-09-10 | End: 2022-09-10 | Stop reason: HOSPADM

## 2022-09-10 RX ORDER — TERBUTALINE SULFATE 1 MG/ML
0.25 INJECTION, SOLUTION SUBCUTANEOUS
Status: DISCONTINUED | OUTPATIENT
Start: 2022-09-10 | End: 2022-09-10 | Stop reason: HOSPADM

## 2022-09-10 RX ORDER — OXYTOCIN 10 [USP'U]/ML
10 INJECTION, SOLUTION INTRAMUSCULAR; INTRAVENOUS
Status: DISCONTINUED | OUTPATIENT
Start: 2022-09-10 | End: 2022-09-12 | Stop reason: HOSPADM

## 2022-09-10 RX ORDER — METHYLERGONOVINE MALEATE 0.2 MG/ML
200 INJECTION INTRAVENOUS
Status: DISCONTINUED | OUTPATIENT
Start: 2022-09-10 | End: 2022-09-12 | Stop reason: HOSPADM

## 2022-09-10 RX ORDER — LIDOCAINE 40 MG/G
CREAM TOPICAL
Status: DISCONTINUED | OUTPATIENT
Start: 2022-09-10 | End: 2022-09-10 | Stop reason: HOSPADM

## 2022-09-10 RX ORDER — FENTANYL CITRATE-0.9 % NACL/PF 10 MCG/ML
100 PLASTIC BAG, INJECTION (ML) INTRAVENOUS EVERY 5 MIN PRN
Status: DISCONTINUED | OUTPATIENT
Start: 2022-09-10 | End: 2022-09-10 | Stop reason: HOSPADM

## 2022-09-10 RX ORDER — SODIUM CHLORIDE, SODIUM LACTATE, POTASSIUM CHLORIDE, CALCIUM CHLORIDE 600; 310; 30; 20 MG/100ML; MG/100ML; MG/100ML; MG/100ML
INJECTION, SOLUTION INTRAVENOUS CONTINUOUS PRN
Status: DISCONTINUED | OUTPATIENT
Start: 2022-09-10 | End: 2022-09-10 | Stop reason: HOSPADM

## 2022-09-10 RX ORDER — METOCLOPRAMIDE 10 MG/1
10 TABLET ORAL EVERY 6 HOURS PRN
Status: DISCONTINUED | OUTPATIENT
Start: 2022-09-10 | End: 2022-09-10 | Stop reason: HOSPADM

## 2022-09-10 RX ORDER — METOCLOPRAMIDE HYDROCHLORIDE 5 MG/ML
10 INJECTION INTRAMUSCULAR; INTRAVENOUS EVERY 6 HOURS PRN
Status: DISCONTINUED | OUTPATIENT
Start: 2022-09-10 | End: 2022-09-10 | Stop reason: HOSPADM

## 2022-09-10 RX ORDER — OXYTOCIN 10 [USP'U]/ML
10 INJECTION, SOLUTION INTRAMUSCULAR; INTRAVENOUS
Status: DISCONTINUED | OUTPATIENT
Start: 2022-09-10 | End: 2022-09-10 | Stop reason: HOSPADM

## 2022-09-10 RX ORDER — ALBUTEROL SULFATE 90 UG/1
2 AEROSOL, METERED RESPIRATORY (INHALATION) EVERY 4 HOURS PRN
Status: DISCONTINUED | OUTPATIENT
Start: 2022-09-10 | End: 2022-09-12 | Stop reason: HOSPADM

## 2022-09-10 RX ORDER — CARBOPROST TROMETHAMINE 250 UG/ML
250 INJECTION, SOLUTION INTRAMUSCULAR
Status: DISCONTINUED | OUTPATIENT
Start: 2022-09-10 | End: 2022-09-10 | Stop reason: HOSPADM

## 2022-09-10 RX ORDER — FLUTICASONE PROPIONATE 110 UG/1
1 AEROSOL, METERED RESPIRATORY (INHALATION) 2 TIMES DAILY
Status: DISCONTINUED | OUTPATIENT
Start: 2022-09-10 | End: 2022-09-12 | Stop reason: HOSPADM

## 2022-09-10 RX ORDER — MISOPROSTOL 200 UG/1
800 TABLET ORAL
Status: DISCONTINUED | OUTPATIENT
Start: 2022-09-10 | End: 2022-09-12 | Stop reason: HOSPADM

## 2022-09-10 RX ORDER — DOCUSATE SODIUM 100 MG/1
100 CAPSULE, LIQUID FILLED ORAL DAILY
Status: DISCONTINUED | OUTPATIENT
Start: 2022-09-11 | End: 2022-09-12 | Stop reason: HOSPADM

## 2022-09-10 RX ORDER — OXYTOCIN 10 [USP'U]/ML
INJECTION, SOLUTION INTRAMUSCULAR; INTRAVENOUS
Status: DISCONTINUED
Start: 2022-09-10 | End: 2022-09-11 | Stop reason: HOSPADM

## 2022-09-10 RX ORDER — BISACODYL 10 MG
10 SUPPOSITORY, RECTAL RECTAL DAILY PRN
Status: DISCONTINUED | OUTPATIENT
Start: 2022-09-10 | End: 2022-09-12 | Stop reason: HOSPADM

## 2022-09-10 RX ORDER — OXYTOCIN/0.9 % SODIUM CHLORIDE 30/500 ML
PLASTIC BAG, INJECTION (ML) INTRAVENOUS
Status: DISCONTINUED
Start: 2022-09-10 | End: 2022-09-11 | Stop reason: HOSPADM

## 2022-09-10 RX ORDER — FENTANYL CITRATE 50 UG/ML
100 INJECTION, SOLUTION INTRAMUSCULAR; INTRAVENOUS
Status: DISCONTINUED | OUTPATIENT
Start: 2022-09-10 | End: 2022-09-10 | Stop reason: HOSPADM

## 2022-09-10 RX ORDER — LEVOTHYROXINE SODIUM 50 UG/1
100 TABLET ORAL DAILY
Status: DISCONTINUED | OUTPATIENT
Start: 2022-09-10 | End: 2022-09-12 | Stop reason: HOSPADM

## 2022-09-10 RX ORDER — ACETAMINOPHEN 325 MG/1
650 TABLET ORAL EVERY 4 HOURS PRN
Status: DISCONTINUED | OUTPATIENT
Start: 2022-09-10 | End: 2022-09-10 | Stop reason: HOSPADM

## 2022-09-10 RX ORDER — OXYTOCIN 10 [USP'U]/ML
10 INJECTION, SOLUTION INTRAMUSCULAR; INTRAVENOUS
Status: DISCONTINUED | OUTPATIENT
Start: 2022-09-10 | End: 2022-09-11

## 2022-09-10 RX ORDER — TRANEXAMIC ACID 10 MG/ML
1 INJECTION, SOLUTION INTRAVENOUS EVERY 30 MIN PRN
Status: DISCONTINUED | OUTPATIENT
Start: 2022-09-10 | End: 2022-09-12 | Stop reason: HOSPADM

## 2022-09-10 RX ORDER — HYDROCORTISONE 25 MG/G
CREAM TOPICAL 3 TIMES DAILY PRN
Status: DISCONTINUED | OUTPATIENT
Start: 2022-09-10 | End: 2022-09-12 | Stop reason: HOSPADM

## 2022-09-10 RX ORDER — KETOROLAC TROMETHAMINE 30 MG/ML
30 INJECTION, SOLUTION INTRAMUSCULAR; INTRAVENOUS
Status: DISCONTINUED | OUTPATIENT
Start: 2022-09-10 | End: 2022-09-11

## 2022-09-10 RX ORDER — IBUPROFEN 800 MG/1
800 TABLET, FILM COATED ORAL
Status: DISCONTINUED | OUTPATIENT
Start: 2022-09-10 | End: 2022-09-11

## 2022-09-10 RX ORDER — IBUPROFEN 800 MG/1
800 TABLET, FILM COATED ORAL EVERY 6 HOURS PRN
Status: DISCONTINUED | OUTPATIENT
Start: 2022-09-10 | End: 2022-09-12 | Stop reason: HOSPADM

## 2022-09-10 RX ORDER — TRANEXAMIC ACID 10 MG/ML
1 INJECTION, SOLUTION INTRAVENOUS EVERY 30 MIN PRN
Status: DISCONTINUED | OUTPATIENT
Start: 2022-09-10 | End: 2022-09-10 | Stop reason: HOSPADM

## 2022-09-10 RX ORDER — OXYTOCIN/0.9 % SODIUM CHLORIDE 30/500 ML
1-24 PLASTIC BAG, INJECTION (ML) INTRAVENOUS CONTINUOUS
Status: DISCONTINUED | OUTPATIENT
Start: 2022-09-10 | End: 2022-09-10 | Stop reason: HOSPADM

## 2022-09-10 RX ORDER — ACETAMINOPHEN 325 MG/1
650 TABLET ORAL EVERY 4 HOURS PRN
Status: DISCONTINUED | OUTPATIENT
Start: 2022-09-10 | End: 2022-09-12 | Stop reason: HOSPADM

## 2022-09-10 RX ORDER — CITRIC ACID/SODIUM CITRATE 334-500MG
30 SOLUTION, ORAL ORAL ONCE
Status: DISCONTINUED | OUTPATIENT
Start: 2022-09-10 | End: 2022-09-10 | Stop reason: HOSPADM

## 2022-09-10 RX ORDER — MODIFIED LANOLIN
OINTMENT (GRAM) TOPICAL
Status: DISCONTINUED | OUTPATIENT
Start: 2022-09-10 | End: 2022-09-12 | Stop reason: HOSPADM

## 2022-09-10 RX ORDER — ONDANSETRON 4 MG/1
4 TABLET, ORALLY DISINTEGRATING ORAL EVERY 6 HOURS PRN
Status: DISCONTINUED | OUTPATIENT
Start: 2022-09-10 | End: 2022-09-10 | Stop reason: HOSPADM

## 2022-09-10 RX ORDER — MISOPROSTOL 200 UG/1
400 TABLET ORAL
Status: DISCONTINUED | OUTPATIENT
Start: 2022-09-10 | End: 2022-09-10 | Stop reason: HOSPADM

## 2022-09-10 RX ORDER — CARBOPROST TROMETHAMINE 250 UG/ML
250 INJECTION, SOLUTION INTRAMUSCULAR
Status: DISCONTINUED | OUTPATIENT
Start: 2022-09-10 | End: 2022-09-12 | Stop reason: HOSPADM

## 2022-09-10 RX ORDER — METHYLERGONOVINE MALEATE 0.2 MG/ML
200 INJECTION INTRAVENOUS
Status: DISCONTINUED | OUTPATIENT
Start: 2022-09-10 | End: 2022-09-10 | Stop reason: HOSPADM

## 2022-09-10 RX ORDER — CITRIC ACID/SODIUM CITRATE 334-500MG
30 SOLUTION, ORAL ORAL
Status: DISCONTINUED | OUTPATIENT
Start: 2022-09-10 | End: 2022-09-10 | Stop reason: HOSPADM

## 2022-09-10 RX ORDER — NALOXONE HYDROCHLORIDE 0.4 MG/ML
0.2 INJECTION, SOLUTION INTRAMUSCULAR; INTRAVENOUS; SUBCUTANEOUS
Status: DISCONTINUED | OUTPATIENT
Start: 2022-09-10 | End: 2022-09-10 | Stop reason: HOSPADM

## 2022-09-10 RX ORDER — ONDANSETRON 2 MG/ML
4 INJECTION INTRAMUSCULAR; INTRAVENOUS EVERY 6 HOURS PRN
Status: DISCONTINUED | OUTPATIENT
Start: 2022-09-10 | End: 2022-09-10 | Stop reason: HOSPADM

## 2022-09-10 RX ORDER — PROCHLORPERAZINE MALEATE 10 MG
10 TABLET ORAL EVERY 6 HOURS PRN
Status: DISCONTINUED | OUTPATIENT
Start: 2022-09-10 | End: 2022-09-10 | Stop reason: HOSPADM

## 2022-09-10 RX ORDER — FENTANYL/ROPIVACAINE/NS/PF 2MCG/ML-.1
PLASTIC BAG, INJECTION (ML) EPIDURAL
Status: COMPLETED
Start: 2022-09-10 | End: 2022-09-10

## 2022-09-10 RX ORDER — OXYTOCIN/0.9 % SODIUM CHLORIDE 30/500 ML
100-340 PLASTIC BAG, INJECTION (ML) INTRAVENOUS CONTINUOUS PRN
Status: DISCONTINUED | OUTPATIENT
Start: 2022-09-10 | End: 2022-09-11

## 2022-09-10 RX ORDER — MISOPROSTOL 200 UG/1
800 TABLET ORAL
Status: DISCONTINUED | OUTPATIENT
Start: 2022-09-10 | End: 2022-09-10 | Stop reason: HOSPADM

## 2022-09-10 RX ORDER — FENTANYL/ROPIVACAINE/NS/PF 2MCG/ML-.1
PLASTIC BAG, INJECTION (ML) EPIDURAL
Status: DISCONTINUED | OUTPATIENT
Start: 2022-09-10 | End: 2022-09-10 | Stop reason: HOSPADM

## 2022-09-10 RX ORDER — FENTANYL CITRATE-0.9 % NACL/PF 10 MCG/ML
PLASTIC BAG, INJECTION (ML) INTRAVENOUS
Status: DISCONTINUED
Start: 2022-09-10 | End: 2022-09-11 | Stop reason: HOSPADM

## 2022-09-10 RX ORDER — PROCHLORPERAZINE 25 MG
25 SUPPOSITORY, RECTAL RECTAL EVERY 12 HOURS PRN
Status: DISCONTINUED | OUTPATIENT
Start: 2022-09-10 | End: 2022-09-10 | Stop reason: HOSPADM

## 2022-09-10 RX ORDER — LIDOCAINE HYDROCHLORIDE 10 MG/ML
INJECTION, SOLUTION EPIDURAL; INFILTRATION; INTRACAUDAL; PERINEURAL
Status: COMPLETED
Start: 2022-09-10 | End: 2022-09-10

## 2022-09-10 RX ORDER — MISOPROSTOL 200 UG/1
400 TABLET ORAL
Status: DISCONTINUED | OUTPATIENT
Start: 2022-09-10 | End: 2022-09-12 | Stop reason: HOSPADM

## 2022-09-10 RX ORDER — MISOPROSTOL 200 UG/1
TABLET ORAL
Status: COMPLETED
Start: 2022-09-10 | End: 2022-09-10

## 2022-09-10 RX ORDER — OXYTOCIN/0.9 % SODIUM CHLORIDE 30/500 ML
340 PLASTIC BAG, INJECTION (ML) INTRAVENOUS CONTINUOUS PRN
Status: DISCONTINUED | OUTPATIENT
Start: 2022-09-10 | End: 2022-09-12 | Stop reason: HOSPADM

## 2022-09-10 RX ORDER — NALBUPHINE HYDROCHLORIDE 10 MG/ML
2.5-5 INJECTION, SOLUTION INTRAMUSCULAR; INTRAVENOUS; SUBCUTANEOUS EVERY 6 HOURS PRN
Status: DISCONTINUED | OUTPATIENT
Start: 2022-09-10 | End: 2022-09-11

## 2022-09-10 RX ADMIN — Medication 2 MILLI-UNITS/MIN: at 15:55

## 2022-09-10 RX ADMIN — Medication: at 20:09

## 2022-09-10 RX ADMIN — LIDOCAINE HYDROCHLORIDE 20 ML: 10 INJECTION, SOLUTION EPIDURAL; INFILTRATION; INTRACAUDAL; PERINEURAL at 22:16

## 2022-09-10 RX ADMIN — MISOPROSTOL 800 MCG: 200 TABLET ORAL at 22:50

## 2022-09-10 RX ADMIN — IBUPROFEN 800 MG: 800 TABLET, FILM COATED ORAL at 23:04

## 2022-09-10 RX ADMIN — SODIUM CHLORIDE, POTASSIUM CHLORIDE, SODIUM LACTATE AND CALCIUM CHLORIDE: 600; 310; 30; 20 INJECTION, SOLUTION INTRAVENOUS at 15:49

## 2022-09-10 RX ADMIN — SODIUM CHLORIDE, POTASSIUM CHLORIDE, SODIUM LACTATE AND CALCIUM CHLORIDE 450 ML: 600; 310; 30; 20 INJECTION, SOLUTION INTRAVENOUS at 19:50

## 2022-09-10 RX ADMIN — BUPIVACAINE HYDROCHLORIDE 10 ML: 2.5 INJECTION, SOLUTION EPIDURAL; INFILTRATION; INTRACAUDAL at 20:10

## 2022-09-10 ASSESSMENT — ACTIVITIES OF DAILY LIVING (ADL)
TOILETING_ISSUES: NO
DIFFICULTY_EATING/SWALLOWING: NO
CHANGE_IN_FUNCTIONAL_STATUS_SINCE_ONSET_OF_CURRENT_ILLNESS/INJURY: NO
HEARING_DIFFICULTY_OR_DEAF: NO
WALKING_OR_CLIMBING_STAIRS_DIFFICULTY: NO
ADLS_ACUITY_SCORE: 18
ADLS_ACUITY_SCORE: 18
DIFFICULTY_COMMUNICATING: NO
FALL_HISTORY_WITHIN_LAST_SIX_MONTHS: NO
ADLS_ACUITY_SCORE: 31
WEAR_GLASSES_OR_BLIND: NO
DOING_ERRANDS_INDEPENDENTLY_DIFFICULTY: NO
ADLS_ACUITY_SCORE: 18
ADLS_ACUITY_SCORE: 18
DRESSING/BATHING_DIFFICULTY: NO
CONCENTRATING,_REMEMBERING_OR_MAKING_DECISIONS_DIFFICULTY: NO

## 2022-09-10 NOTE — CARE PLAN
Data: assumed care at 1415.   Reason for maternal/fetal assessment per patient is Rule out rupture of membranes  .  Patient is a . Prenatal record reviewed.      OB History    Para Term  AB Living   4 1 1 0 2 1   SAB IAB Ectopic Multiple Live Births   1 1 0 1 1      # Outcome Date GA Lbr Jose/2nd Weight Sex Delivery Anes PTL Lv   4 Current            3 IAB 21 12w5d    AB, COMPLETE      2 Term 19 39w1d 02:15 :36 3.26 kg (7 lb 3 oz) F Vag-Spont EPI, Local  MARCO      Name: SHANNON LOPEZ-ABIMBOLA      Apgar1: 8  Apgar5: 9   1A SAB 17 19w1d  0.278 kg (9.8 oz) M Vag-Spont Nitrous, IV Y FD      Complications: Prolonged PROM (>18 hours)      Name: JERONIMO LOPEZ FD      Apgar1: 0  Apgar5: 0   1B SAB 17 19w1d  0.188 kg (6.6 oz) F Vag-Spont IV, Nitrous Y FD      Complications: Prolonged PROM (>18 hours)      Name: JAMAAL LOPEZ2 ABIMBOLA ACKERMAN      Apgar1: 0  Apgar5: 0      Obstetric Comments   Lia   . Medical history:   Past Medical History:   Diagnosis Date     Infertility, female      Subclinical hypothyroidism 2016     Varicella    . Gestational Age 39w2d. VSS. Fetal movement present. Patient denies cramping, backache, vaginal discharge, pelvic pressure, UTI symptoms, GI problems, bloody show, vaginal bleeding, edema, headache, visual disturbances, epigastric or URQ pain, abdominal pain, rupture of membranes. Support persons  present.  Action: Verbal consent for EFM. Triage assessment completed. EFM applied for fetal monitoring. Uterine assessment mariela see flowsheet . Fetal assessment: Presumed adequate fetal oxygenation documented (see flow record).   Response: Dr. Pike informed of arrival. Plan per provider is admission. Patient verbalized agreement with plan. Patient transferred to room 442 ambulatory, oriented to room and call light.

## 2022-09-10 NOTE — H&P
OB History and Physical     Lolis Johnson    MRN# [id number]  YOB: 1985      HPI: Lolis Johnson is a 37 year old  at 39w2d by LMP c/w 8w3d US who presents today for SROM. Rupture occurred at 1230 for yellow fluid without odor.    Pregnancy notable for:   Hypothyroidism   Asthma    Her previous pregnancies were notable for hemorrhage per patient report. Her delivery was uncomplicated per patient report. Denies history of  shoulder dystocia, pre-eclampsia. No history of high blood pressure.    She reports good fetal movement. Denies vaginal bleeding or contractions.  She denies fever, chills, SOB, chest pain, palpitations, N/V, LE swelling/tenderness.  No concerns for headache, vision changes, RUQ or epigastric pain      Prenatal Labs:   Lab Results   Component Value Date    ABO AB 2021    RH Pos 2021    AS Negative 2022    HEPBANG Nonreactive 2022    CHPCRT  2017     Negative   Negative for C. trachomatis rRNA by transcription mediated amplification.   A negative result by transcription mediated amplification does not preclude the   presence of C. trachomatis infection because results are dependent on proper   and adequate collection, absence of inhibitors, and sufficient rRNA to be   detected.      GCPCRT  2017     Negative   Negative for N. gonorrhoeae rRNA by transcription mediated amplification.   A negative result by transcription mediated amplification does not preclude the   presence of N. gonorrhoeae infection because results are dependent on proper   and adequate collection, absence of inhibitors, and sufficient rRNA to be   detected.      TREPAB Negative 2017    HGB 12.1 2022       GBS Status:   Lab Results   Component Value Date    GBS Negative 03/15/2019         OBHX:   OB History    Para Term  AB Living   4 1 1 0 2 1   SAB IAB Ectopic Multiple Live Births   1 1 0 1 1      # Outcome Date GA Lbr Jose/2nd Weight Sex Delivery  Anes PTL Lv   4 Current            3 IAB 02/16/21 12w5d    AB, COMPLETE      2 Term 04/04/19 39w1d 02:15 / 01:36 3.26 kg (7 lb 3 oz) F Vag-Spont EPI, Local  MARCO      Name: SHANNON LOPEZ-ABIMBOLA      Apgar1: 8  Apgar5: 9   1A SAB 06/21/17 19w1d  0.278 kg (9.8 oz) M Vag-Spont Nitrous, IV Y FD      Complications: Prolonged PROM (>18 hours)      Name: JERONIMO LOPEZ FD      Apgar1: 0  Apgar5: 0   1B SAB 06/21/17 19w1d  0.188 kg (6.6 oz) F Vag-Spont IV, Nitrous Y FD      Complications: Prolonged PROM (>18 hours)      Name: SAE LOPEZ FD      Apgar1: 0  Apgar5: 0      Obstetric Comments   Lia       MedicalHX:   Past Medical History:   Diagnosis Date     Infertility, female      Subclinical hypothyroidism 4/29/2016     Varicella        SurgicalHX:   Past Surgical History:   Procedure Laterality Date     DILATION AND EVACUATION N/A 2/18/2021    Procedure: DILATION AND CURETTAGE, UTERUS;  Surgeon: Christiane Lu MD;  Location: [facility] OR     SINUS SURGERY         Medications:   No current facility-administered medications on file prior to encounter.  albuterol (PROAIR HFA/PROVENTIL HFA/VENTOLIN HFA) 108 (90 Base) MCG/ACT inhaler, Inhale 2 puffs into the lungs every 4 hours as needed  fluticasone (FLOVENT HFA) 110 MCG/ACT inhaler, Inhale 1 puff into the lungs 2 times daily  levothyroxine (SYNTHROID/LEVOTHROID) 100 MCG tablet, Take 1 tablet (100 mcg) by mouth daily  omeprazole (PRILOSEC) 40 MG DR capsule, Take 1 capsule (40 mg) by mouth daily  Prenatal Vit-DSS-Fe Cbn-FA (PRENATAL AD PO),         Allergies:  Allergies   Allergen Reactions     Sulfa Drugs      Sulfasalazine Unknown       FamilyHX:  Family History   Problem Relation Age of Onset     Breast Cancer Paternal Grandmother        SocialHX:   Social History     Socioeconomic History     Marital status:      Spouse name: None     Number of children: None     Years of education: None     Highest education level: None   Tobacco Use     Smoking status:  Never Smoker     Smokeless tobacco: Never Used   Vaping Use     Vaping Use: Never used   Substance and Sexual Activity     Alcohol use: Not Currently     Comment: occ     Drug use: No     Sexual activity: Yes     Partners: Male     Birth control/protection: None   Other Topics Concern     Parent/sibling w/ CABG, MI or angioplasty before 65F 55M? No     Social Determinants of Health     Intimate Partner Violence: Not At Risk     Fear of Current or Ex-Partner: No     Emotionally Abused: No     Physically Abused: No     Sexually Abused: No       ROS: 10 point ROS negative other than above    Physical Exam:  Patient Vitals for the past 24 hrs:   BP Temp Temp src Pulse Resp   09/10/22 1424 108/65 96.8  F (36  C) Oral 71 18     General: AAOx3, appropriately interactive, NAD, appears generally well  CV: RRR, normal S1/S2, no m/r/g  Lungs: CTAB, non-labored breathing, no wheezes, rales, or rhonchi  Abdomen: soft, gravid, non-tender, EFW 7#; ceph by BSUS  SVE:  3/50/-3  Extremities: Non-tender with trace edema bilaterally in LE    FHT: 130 moderate variability, accels present, isolated late decel  [place]: 2 contractions in ten minutes    Labs:    Latest Reference Range & Units 09/10/22 14:57   WBC 4.0 - 11.0 10e3/uL 8.7   Hemoglobin 11.7 - 15.7 g/dL 11.6 (L)   Hematocrit 35.0 - 47.0 % 34.8 (L)   Platelet Count 150 - 450 10e3/uL 168   RBC Count 3.80 - 5.20 10e6/uL 3.71 (L)   MCV 78 - 100 fL 94   MCH 26.5 - 33.0 pg 31.3   MCHC 31.5 - 36.5 g/dL 33.3   RDW 10.0 - 15.0 % 13.2   (L): Data is abnormally low    Assessment & Plan: 37 year old  at 39w2d by 8w3d US, here for PROM. Pregnancy is notable for hypothyroidism and asthma.     # Induction of Labor  - Admit for IOL in the setting of PROM  - Cervix: 3/50/-3   - Given above Bishops score, will plan to start pitocin  - Membranes: SROM 1230, mec  - Augmentation: pitocin  - Labs: CBC, T&S, RPR  - GBS negative; Antibiotics not indicated.  - Pain Control: Per patient request;  Discussed options    - Vistaril and IM Morphine for therapeutic sleep   - Desires epidural in active labor.   - Diet: Regular in early labor. NPO once on pitocin or in active labor  - PPH Meds: Avoid hemabate    # Hypothyroidism  - Continue pta 100 mcg levothyroxine  - Prepregnancy dosing also 100 mcg, will continue postpartum    # PNC  - Rh positive, Rubella immune, GTT wnl, GBS neg  - Other prenatal labs wnl  - Imaging: see imaging tab     # FWB:   Cat II tracing, reactive and overall reassuring; ceph by BSUS; EFW 7#  - Continuous Fetal Monitoring  - NICU for delivery   - Intrauterine resuscitative measures prn      Patient discussed with Dr. Mickey Pike MD  OBGYN PGY-3  September 10, 2022 2:45 PM

## 2022-09-10 NOTE — ANESTHESIA PREPROCEDURE EVALUATION
Anesthesia Pre-Procedure Evaluation    Patient: Lolis Johnson   MRN: [id number] : 1985        Procedure :           Past Medical History:   Diagnosis Date     Infertility, female      Subclinical hypothyroidism 2016     Varicella       Past Surgical History:   Procedure Laterality Date     DILATION AND EVACUATION N/A 2021    Procedure: DILATION AND CURETTAGE, UTERUS;  Surgeon: Christiane Lu MD;  Location: [facility] OR     SINUS SURGERY        Allergies   Allergen Reactions     Sulfa Drugs      Sulfasalazine Unknown      Social History     Tobacco Use     Smoking status: Never Smoker     Smokeless tobacco: Never Used   Substance Use Topics     Alcohol use: Not Currently     Comment: occ      Wt Readings from Last 1 Encounters:   22 92.1 kg (203 lb)        Anesthesia Evaluation   Pt has had prior anesthetic. Type: MAC and OB Labor Epidural.        ROS/MED HX  ENT/Pulmonary: Comment: COVID positive 2022    (+) Mild Persistent, asthma     Neurologic:       Cardiovascular:       METS/Exercise Tolerance:     Hematologic: Comments: Hb:11.8    (+) no thrombocytopenia,     Musculoskeletal:       GI/Hepatic:     (+) GERD,     Renal/Genitourinary:       Endo:     (+) thyroid problem, hypothyroidism,  (-) Type I DM   Psychiatric/Substance Use:       Infectious Disease:       Malignancy:       Other:     (-) previous        Physical Exam    Airway        Mallampati: II   TM distance: > 3 FB   Neck ROM: full   Mouth opening: > 3 cm    Respiratory Devices and Support         Dental  no notable dental history         Cardiovascular   cardiovascular exam normal          Pulmonary   pulmonary exam normal                OUTSIDE LABS:  CBC:   Lab Results   Component Value Date    WBC 9.1 2022    WBC 4.3 2021    HGB 12.1 2022    HGB 11.8 2022    HCT 37.5 2022    HCT 41.7 2021     2022     2021     BMP: No results found for: NA,  POTASSIUM, CHLORIDE, CO2, BUN, CR, GLC  COAGS:   Lab Results   Component Value Date    PTT 26 06/18/2017    INR 1.02 06/18/2017    FIBR 638 (H) 06/18/2017     POC:   Lab Results   Component Value Date    BGM 94 02/18/2021     HEPATIC: No results found for: ALBUMIN, PROTTOTAL, ALT, AST, GGT, ALKPHOS, BILITOTAL, BILIDIRECT, VERNON  OTHER:   Lab Results   Component Value Date    TSH 1.76 08/25/2022    T4 1.06 02/02/2021       Anesthesia Plan    ASA Status:  2   NPO Status:  ELEVATED Aspiration Risk/Unknown    Anesthesia Type: Epidural.              Consents    Anesthesia Plan(s) and associated risks, benefits, and realistic alternatives discussed. Questions answered and patient/representative(s) expressed understanding.    - Discussed:     - Discussed with:  Patient         Postoperative Care            Comments:           H&P reviewed: Unable to attach VIRTUAL H&P to encounter due to EHR limitations. Appropriate H&P reviewed. The physical exam performed by anesthesia during this surgical encounter serves as the physical portion of that virtual H&P.  Any significant changes noted within this preop evaluation.          Rosibel Galloway MD

## 2022-09-10 NOTE — PROVIDER NOTIFICATION
1415-assumed care of patient. Dr Pike notified of meconium and late deceleration, MD to bedside, admission in progress. Plan for pitocin once IV is started.

## 2022-09-10 NOTE — PROGRESS NOTES
S; feeling cts more now, but still comfortable    O: /59   Pulse 71   Temp 97.6  F (36.4  C) (Oral)   Resp 18   LMP 12/09/2021   Breastfeeding No     FHT: 140, +accels, -decels, mod ba  Aguilar: Q5-12 min    Pit: 2mu/min    A/P: IUP at 39w2d, PROM   Fwbr, cat1 tracing   Cont to titrate pitocin    HOMER ALMANZA MD

## 2022-09-11 LAB
HGB BLD-MCNC: 11.4 G/DL (ref 11.7–15.7)
T PALLIDUM AB SER QL: NONREACTIVE

## 2022-09-11 PROCEDURE — 120N000002 HC R&B MED SURG/OB UMMC

## 2022-09-11 PROCEDURE — 36415 COLL VENOUS BLD VENIPUNCTURE: CPT | Performed by: STUDENT IN AN ORGANIZED HEALTH CARE EDUCATION/TRAINING PROGRAM

## 2022-09-11 PROCEDURE — 85018 HEMOGLOBIN: CPT | Performed by: STUDENT IN AN ORGANIZED HEALTH CARE EDUCATION/TRAINING PROGRAM

## 2022-09-11 PROCEDURE — 250N000013 HC RX MED GY IP 250 OP 250 PS 637: Performed by: STUDENT IN AN ORGANIZED HEALTH CARE EDUCATION/TRAINING PROGRAM

## 2022-09-11 RX ORDER — AMOXICILLIN 250 MG
1 CAPSULE ORAL DAILY
COMMUNITY
Start: 2022-09-11 | End: 2022-10-11

## 2022-09-11 RX ORDER — IBUPROFEN 600 MG/1
600 TABLET, FILM COATED ORAL EVERY 6 HOURS PRN
COMMUNITY
Start: 2022-09-11 | End: 2022-10-11

## 2022-09-11 RX ORDER — ACETAMINOPHEN 325 MG/1
650 TABLET ORAL EVERY 6 HOURS PRN
COMMUNITY
Start: 2022-09-11 | End: 2022-10-11

## 2022-09-11 RX ADMIN — IBUPROFEN 800 MG: 800 TABLET, FILM COATED ORAL at 05:32

## 2022-09-11 RX ADMIN — LEVOTHYROXINE SODIUM 100 MCG: 50 TABLET ORAL at 05:42

## 2022-09-11 RX ADMIN — IBUPROFEN 800 MG: 800 TABLET, FILM COATED ORAL at 18:03

## 2022-09-11 RX ADMIN — DOCUSATE SODIUM 100 MG: 100 CAPSULE, LIQUID FILLED ORAL at 08:55

## 2022-09-11 RX ADMIN — IBUPROFEN 800 MG: 800 TABLET, FILM COATED ORAL at 12:03

## 2022-09-11 RX ADMIN — OMEPRAZOLE 40 MG: 20 CAPSULE, DELAYED RELEASE ORAL at 08:57

## 2022-09-11 ASSESSMENT — ACTIVITIES OF DAILY LIVING (ADL)
ADLS_ACUITY_SCORE: 18

## 2022-09-11 NOTE — PROVIDER NOTIFICATION
09/10/22 2244   Provider Notification   Provider Name/Title Dr. Pike   Method of Notification Phone   Request Evaluate - Remote   Notification Reason Bleeding   Provider notified pt trickling blood following fundal check, uterus firm. Per MD tgive pt 800 mcg rectal miso

## 2022-09-11 NOTE — PLAN OF CARE
VSS. Fundal checks WDL. Bleeding improved following rectal miso. Denies pain.  Mother and baby in stable condition. Repor to IRENE Sylvester at 0835

## 2022-09-11 NOTE — ANESTHESIA PROCEDURE NOTES
Epidural catheter Procedure Note    Pre-Procedure   Staff -        Anesthesiologist:  Shawnee Pederson MD       Resident/Fellow: Rosibel Galloway MD       Performed By: resident       Location: OB       Procedure Start/Stop Times: 9/10/2022 8:02 PM and 9/10/2022 8:16 PM       Pre-Anesthestic Checklist: patient identified, IV checked, risks and benefits discussed, informed consent, monitors and equipment checked, pre-op evaluation, at physician/surgeon's request and post-op pain management  Timeout:       Correct Patient: Yes        Correct Procedure: Yes        Correct Site: Yes        Correct Position: Yes   Procedure Documentation  Procedure: epidural catheter       Patient Position: sitting       Patient Prep/Sterile Barriers: sterile gloves, mask       Skin prep: Chloraprep       Local skin infiltrated with mL of 1% lidocaine.        Insertion Site: L3-4. (midline approach).       Technique: LORT saline        PRANEETH at 5 cm.       Needle Type: Convertigo needle       Needle Gauge: 17.        Needle Length (Inches): 5        Catheter: 19 G.          Catheter threaded easily.         5 cm epidural space.         Threaded 10 cm at skin.         # of attempts: 1 and  # of redirects:  0    Assessment/Narrative         Paresthesias: No.       Test dose of mL lidocaine 1.5% w/ 1:200,000 epinephrine at 20:06 CDT.         Test dose negative, 3 minutes after injection, for signs of intravascular, subdural, or intrathecal injection.       Insertion/Infusion Method: LORT saline       Aspiration negative for Heme or CSF via Epidural Catheter.       Sensory Level Left: T6.       Sensory Level Right: T6.    Medication(s) Administered   0.125% bupivacaine 5 mL + fentanyl 20 mcg + NS 5 mL (Epidural) (Mixture components: bupivacaine HCl (PF) 0.25 % Soln, 5 mL; fentaNYL (PF) 100 MCG/2ML Soln, 20 mcg; sodium chloride 0.9 % Soln, 5 mL) - EPIDURAL   10 mL - 9/10/2022 8:10:00 PM  Medication Administration Time: 9/10/2022 8:02 PM      Comments:  Sensory Level--->Left side T6                        --->Right side T8    analgesia is improved by turning the patients her left side  Followed by adjustment  Sensory Level improved to -->T6 B/L

## 2022-09-11 NOTE — PROVIDER NOTIFICATION
09/11/22 0007   Provider Notification   Provider Name/Title Dr. Pike, G3   Method of Notification Phone   Notification Reason Bleeding     Provider updated about bleeding, Dr. Pike okay with pt transfer to [facility] at this time and to monitor bleeding closely.   Please arrange MFM consult with cervical evaluation and Level 2 u/s with MFM at  site if possible. This is for history of  delivery and AMA. I will sign the paper referral order later as needed. The quad screening is pending.     Order and records faxed 17. Quad screen pending and need to fax when completed.    Kezia Crump, CMA

## 2022-09-11 NOTE — PLAN OF CARE
Patient arrived to [facility] unit via wheelchair at 0040 ,with belongings, accompanied by spouse/ significant other, with infant in arms. Received report from  Higinio TA RN  and checked bands. Unit and room orientation completd. Call light given and within arms reach; no concerns present at this time. Continue with plan of care.

## 2022-09-11 NOTE — DISCHARGE SUMMARY
[facility] Discharge Summary    Lolis Johnson MRN# [id number]   Age: 37 year old YOB: 1985     Date of Admission:  9/10/2022  Date of Discharge:  22    Admitting Physician:  Charity Arevalo MD  Discharge Physician:  Mleisa Acosta MD    Admit Dx:   - Intrauterine pregnancy at 39w2d   - Hypothyroidism   - Asthma  - COVID positive diagnosed on admission, asymptomatic     Discharge Dx:  - Same as above, s/p     Procedures:  - Spontaneous vaginal delivery  - Epidural analgesia    Admit HPI/Labor Course:  Lolis Johnson is a 37 year old  at 39w2d by LMP c/w 8w3d US who presents today for SROM. Rupture occurred at 1230 for yellow fluid without odor.     Pregnancy notable for:   Hypothyroidism   Asthma     Her previous pregnancies were notable for hemorrhage per patient report. Her delivery was uncomplicated per patient report. Denies history of  shoulder dystocia, pre-eclampsia. No history of high blood pressure.She reports good fetal movement. Denies vaginal bleeding or contractions.  She denies fever, chills, SOB, chest pain, palpitations, N/V, LE swelling/tenderness.  No concerns for headache, vision changes, RUQ or epigastric pain      Delivery Note:  Lolis Johnson is a 37 year old  at 39w2d who presented to L&D for PROM. Pregnancy was complicated by: hypothyroidism and asthma, GBS negative.     SROM happened spontaneously at home with notable meconium around 1230 pm, and patient's SVE on admission was 3/50/-3, no regular contractions. Labor progressed with pitocin for augmentation.  She received an epidural for pain. The patient progressed to complete and pushed for approximately 12 minutes. FHT was Cat II immediately prior to delivery with late decelerations though the patient was making rapid cervical change. She subsequently had an uncomplicated  of a liveborn female infant at 2204 on 9/10/2022 . The infant head was delivered in OA position. No  nuchal. NICU was present for delivery. Apgars were 8 and 9 and 1 and 5 minutes. Weight pending. The cord was allowed to pulse for 1 minute and was then clamped and cut. Infant was then handed to mother. Routine cord blood was obtained. IV pitocin was started for active management of the third stage. The placenta was delivered with gentle downward traction. It was found to be intact with a three vessel cord. Uterine tone was optimal. She sustained a second degree perineal laceration that was repaired in the usual fashion with a 3-0 Vicryl after infiltration of 1% lidocaine. Upon final inspection, there was good hemostasis and uterine tone was adequare. Instrument and sharp count was correct. QBL: 100 mL    Please see her Admission H&P and Delivery Summary for further details.    Postpartum Course:  Her postpartum course was uncomplicated. On PPD#2, she was meeting all of her postpartum goals and deemed stable for discharge. She was voiding without difficulty, tolerating a regular diet without nausea and vomiting, her pain was well controlled on oral pain medicines and her lochia was appropriate. Her hemoglobin prior to delivery was 11.6 and after delivery was 11.4. Her Rh status was positive, and Rhogam was not indicated.     Discharge Medications:     Review of your medicines      START taking      Dose / Directions   acetaminophen 325 MG tablet  Commonly known as: TYLENOL  Used for:  (normal spontaneous vaginal delivery)      Dose: 650 mg  Take 2 tablets (650 mg) by mouth every 6 hours as needed for mild pain Start after Delivery.  Refills: 0     ibuprofen 600 MG tablet  Commonly known as: ADVIL/MOTRIN  Used for:  (normal spontaneous vaginal delivery)      Dose: 600 mg  Take 1 tablet (600 mg) by mouth every 6 hours as needed for moderate pain Start after delivery  Refills: 0     senna-docusate 8.6-50 MG tablet  Commonly known as: SENOKOT-S/PERICOLACE  Used for:  (normal spontaneous vaginal delivery)       Dose: 1 tablet  Take 1 tablet by mouth daily for 30 days Start after delivery.  Refills: 0        CONTINUE these medicines which have NOT CHANGED      Dose / Directions   albuterol 108 (90 Base) MCG/ACT inhaler  Commonly known as: PROAIR HFA/PROVENTIL HFA/VENTOLIN HFA      Dose: 2 puff  Inhale 2 puffs into the lungs every 4 hours as needed  Refills: 0     fluticasone 110 MCG/ACT inhaler  Commonly known as: FLOVENT HFA  Used for: Mild persistent asthma, unspecified whether complicated      Dose: 1 puff  Inhale 1 puff into the lungs 2 times daily  Quantity: 12 g  Refills: 1     levothyroxine 100 MCG tablet  Commonly known as: SYNTHROID/LEVOTHROID  Used for: Subclinical hypothyroidism      Dose: 100 mcg  Take 1 tablet (100 mcg) by mouth daily  Quantity: 90 tablet  Refills: 1     omeprazole 40 MG DR capsule  Commonly known as: priLOSEC  Used for: Gastroesophageal reflux disease with esophagitis without hemorrhage      Dose: 40 mg  Take 1 capsule (40 mg) by mouth daily  Quantity: 60 capsule  Refills: 1     PRENATAL AD PO      Refills: 0           Where to get your medicines      Some of these will need a paper prescription and others can be bought over the counter. Ask your nurse if you have questions.    You don't need a prescription for these medications    acetaminophen 325 MG tablet    ibuprofen 600 MG tablet    senna-docusate 8.6-50 MG tablet           Discharge/Disposition:  Lolis Johnson was discharged to home in stable condition with the following instructions/medications:  1) Call for temperature > 100.4, bright red vaginal bleeding >1 pad an hour x 2 hours, foul smelling vaginal discharge, pain not controlled by usual oral pain meds, persistent nausea and vomiting not controlled on medications  2) She was undecided for birth control, to discuss at 6 week postpartum  3) For feeding she decided to breastfeed.  4) She was instructed to follow-up with her primary OB in 6 weeks for a routine postpartum  visit.    Mely Corley MD MPH  OB/Gyn Resident PGY-2  9/12/2022  7:57 AM    The patient was seen and examined by me on the day of discharge.  I have reviewed and agree with the resident's above note.    Samantha Ordonez MD, FACOG

## 2022-09-11 NOTE — PLAN OF CARE
Goal Outcome Evaluation: Progressing.    Data: Vital signs within normal limits. Postpartum checks within normal limits - see flow record. Patient eating and drinking normally. Patient able to empty bladder independently and is up ambulating. No apparent signs of infection. Patient performing self cares and is able to care for infant.  Action: Patient medicated during the shift for  perineal discomfort . See MAR. Patient reassessed within 1 hour after each medication and pain was improved - patient stated she was comfortable. Patient education done about breastfeeding. See flow record.  Response: Positive attachment behaviors observed with infant. Support persons Pako, present.   Plan: Anticipate discharge on Monday at 11am.

## 2022-09-11 NOTE — ANESTHESIA PROCEDURE NOTES
Epidural catheter Procedure Note    Pre-Procedure   Staff -        Anesthesiologist:  Shawnee Pederson MD       Resident/Fellow: Rosibel Galloway MD       Performed By: resident       Location: OB       Procedure Start/Stop Times: 9/10/2022 7:22 PM and 9/10/2022 7:32 PM       Pre-Anesthestic Checklist: patient identified, IV checked, risks and benefits discussed, informed consent, monitors and equipment checked, pre-op evaluation, at physician/surgeon's request and post-op pain management  Timeout:       Correct Patient: Yes        Correct Procedure: Yes        Correct Site: Yes        Correct Position: Yes   Procedure Documentation  Procedure: epidural catheter       Patient Position: sitting       Patient Prep/Sterile Barriers: sterile gloves, mask, patient draped       Skin prep: Chloraprep       Local skin infiltrated with mL of 1% lidocaine.        Insertion Site: L3-4. (midline approach).       Technique: LORT saline        PRANEETH at 5 cm.       Needle Type: Bar Sainty needle       Needle Gauge: 17.        Needle Length (Inches): 5        Catheter: 19 G.          Catheter threaded easily.         5 cm epidural space.         Threaded 10 cm at skin.         # of attempts: 1 and  # of redirects:  0    Assessment/Narrative         Paresthesias: No.       Test dose of mL lidocaine 1.5% w/ 1:200,000 epinephrine at 19:28 CDT.         Test dose negative, 3 minutes after injection, for signs of intravascular, subdural, or intrathecal injection.       Insertion/Infusion Method: LORT saline       Aspiration negative for Heme or CSF via Epidural Catheter.       Sensory Level Left: T7.       Sensory Level Right: T7.    Medication(s) Administered   0.125% bupivacaine 5 mL + fentanyl 20 mcg + NS 5 mL (Epidural) (Mixture components: bupivacaine HCl (PF) 0.25 % Soln, 5 mL; fentaNYL (PF) 100 MCG/2ML Soln, 20 mcg; sodium chloride 0.9 % Soln, 5 mL) - EPIDURAL   10 mL - 9/10/2022 7:32:00 PM  Medication Administration Time:  9/10/2022 7:22 PM

## 2022-09-11 NOTE — PLAN OF CARE
of viable Female with Dr. Pike and Dr. Arevalo in attendance.  NICU and Nursery RN  present.  Infant with spontaneous cry, to mother's abdomen, dried and stimulated.  APGAR at 1 minute:  8 and APGAR at 5 minutes:  9.  Placenta delivered with out complication. 2nd degree laceration with repair, emmanuelle cares provided and ice applied to perineum.  Mother and baby in stable condition following delivery. More than average bleeding was noted, per Dr. Pike, 800 mcg given rectal with improvement in bleeding.

## 2022-09-11 NOTE — PLAN OF CARE
Data: Lolis Johnson transferred to [facility] room 7135 via wheelchair at 0032. Baby transferred via parent's arms.  Action: Receiving unit notified of transfer: Yes. Patient and family notified of room change. Report given to Mare MERA RN at 0021. Belongings sent to receiving unit. Accompanied by Registered Nurse. Oriented patient to surroundings. Call light within reach. ID bands ([id number]) double-checked with receiving RN.  Response: Patient tolerated transfer and is stable.

## 2022-09-11 NOTE — PROGRESS NOTES
"Post Partum Progress Note  PPD#1    Subjective:  She states that she is resting comfortably in bed this morning. She complains of nothing. Pain is improving and well controlled on current medication regimen. She is tolerating PO intake. Lochia present and normal.  She is voiding without difficulty. She has passed flatus and has not had a BM. She is ambulating without dizziness or difficulty.  She denies headache, changes in vision, nausea/vomiting, chest pain, shortness of breath, RUQ pain, or worsening edema.  Plans to breastfeed and it is going well.     Objective:  Vital signs:  Temp: 98.7  F (37.1  C) Temp src: Oral BP: 102/58 Pulse: 71   Resp: 18 SpO2: 99 % O2 Device: None (Room air)        Estimated body mass index is 29.98 kg/m  as calculated from the following:    Height as of 22: 1.753 m (5' 9\").    Weight as of 22: 92.1 kg (203 lb).        General: NAD, resting comfortably  CV: Regular rate, well perfused.   Pulm: Normal respiratory effort.  Abd: Soft, non-tender, non-distended. Fundus is firm and 2 cm below the umbilicus.      Ext: no lower extremity edema bilaterally. No calf tenderness.    Assessment/Plan:  Lolis Johnson is a 37 year old  female who is  PPD#1 s/p MSVD. Doing well postpartum    # Hypothyroidism  - Continue  mcg levothyroxine  - Prepregnancy dose 100 mcg     # Asthma  - Continue pta fluticasone and albuterol    # Covid   - Positive   - Asymptomatic at this time    # Postpartum:  - Encourage routine post-partum goals including ambulation   - PNC: Rh pos. Rubella immune. No intervention indicated.  - Pain: controlled on oral medications  - Heme: Hgb 11.6>>11.4.  - GI: continue anti-emetics and stool softeners as needed.  - : Voiding well.  - Infant: Stable in room  - Feeding: Plans on breastfeeding.  - BC: Still thinking about contraception    Anticipate discharge to home on PPD#2    Hemoglobin   Date Value Ref Range Status   2022 11.4 (L) 11.7 - " 15.7 g/dL Final   09/10/2022 11.6 (L) 11.7 - 15.7 g/dL Final   02/02/2021 12.2 11.7 - 15.7 g/dL Final   04/05/2019 10.6 (L) 11.7 - 15.7 g/dL Final

## 2022-09-11 NOTE — L&D DELIVERY NOTE
OB Vaginal Delivery Note    Lolis Johnson MRN# [id number]   Age: 37 year old YOB: 1985     Delivery Note:  Lolis Johnson is a 37 year old  at 39w2d who presented to L&D for PROM. Pregnancy was complicated by: hypothyroidism and asthma, GBS negative.    SROM happened spontaneously at home with notable meconium around 1230 pm, and patient's SVE on admission was 3/50/-3, no regular contractions. Labor progressed with pitocin for augmentation.  She received an epidural for pain. The patient progressed to complete and pushed for approximately 12 minutes. FHT was Cat II immediately prior to delivery with late decelerations though the patient was making rapid cervical change. She subsequently had an uncomplicated  of a liveborn female infant at 2204 on 9/10/2022 . The infant head was delivered in OA position. No nuchal. NICU was present for delivery. Apgars were 8 and 9 and 1 and 5 minutes. Weight pending. The cord was allowed to pulse for 1 minute and was then clamped and cut. Infant was then handed to mother. Routine cord blood was obtained. IV pitocin was started for active management of the third stage. The placenta was delivered with gentle downward traction. It was found to be intact with a three vessel cord. Uterine tone was optimal. She sustained a second degree perineal laceration that was repaired in the usual fashion with a 3-0 Vicryl after infiltration of 1% lidocaine. Upon final inspection, there was good hemostasis and uterine tone was adequare. Instrument and sharp count was correct. QBL: 100 mL    Dr. Arevalo was present for delivery    Riley Pike MD  Obstetrics and Gyncology, PGY-3  September 10, 2022 , 10:41 PM      I was present and scrubbed for entire delivery and repair, agree with note.  HOMER AREVALO MD    GA: 39w2d  GP:   Labor Complications: None   EBL:   mL  Delivery QBL:    Delivery Type: Vaginal, Spontaneous   ROM to Delivery Time: (Delivered) Hours: 9  Minutes: 19   Weight:     1 Minute 5 Minute 10 Minute   Apgar Totals: 8   9        LIO HERNANDEZ;NIKKO SAVAGE;RENETTA ESCOBAR     Delivery Details:  Lolis Johnson, a 37 year old  female delivered a viable infant with apgars of 8  and 9 . Patient was fully dilated and pushing after   hours   minutes in active labor. Delivery was via vaginal, spontaneous  to a sterile field under epidural  anesthesia. Infant delivered in vertex    occiput  anterior  position. Anterior and posterior shoulders delivered without difficulty. The cord was clamped, cut twice and 3 vessels  were noted. Cord blood was obtained in routine fashion with the following disposition: discard .      Cord complications: none   Placenta delivered at 9/10/2022 10:07 PM . Placental disposition was Hospital disposal . Fundal massage performed and fundus found to be firm.     Episiotomy: none    Perineum, vagina, cervix were inspected, and the following lacerations were noted:   Perineal lacerations: 2nd                Any lacerations were repaired in the usual fashion using 3-0 vicryl.    Excellent hemostasis was noted. Needle count correct. Infant and patient in delivery room in good and stable condition.        Mihaela Johnson-Lolis [[id number]]      Labor Event Times      Labor onset date: 9/10/22 Onset time:  5:30 PM          Labor Length      3rd Stage (hrs): 0 (min): 3          Labor Events     labor?: No   steroids: None  Labor Type: Spontaneous  Predominate monitoring during 1st stage: continuous electronic fetal monitoring     Antibiotics received during labor?: No       Rupture date/time: 9/10/22 1245   Rupture type: Spontaneous rupture of membranes occuring during spontaneous labor or augmentation  Fluid color: Meconium  Fluid odor: Normal     Augmentation: Oxytocin  Indications for augmentation: Ineffective Contraction Pattern       Delivery/Placenta Date and Time      Delivery Date: 9/10/22 Delivery Time:  10:04 PM   Placenta Date/Time: 9/10/2022 10:07 PM  Oxytocin given at the time of delivery: after delivery of baby  Delivering clinician: Charity Arevalo MD   Other personnel present at delivery:  Provider Role   Riley Pike MD Resident   Lilli Dumont, RN Delivery Nurse   Corrina Palmer, RN Delivery Nurse             Vaginal Counts       Initial count performed by 2 team members:  Two Team Members   Sierra Dumont, IRENE Pike         [place] Suture Needles Sponges (RETIRED) Instruments   Initial counts 2  5    Added to count 0 1 0    Relief counts       Final counts               Placed during labor Accounted for at the end of labor   FSE NA    IUPC NA    Cervidil NA                   Final count performed by 2 team members:  Two Team Members   Sierra Dumont RN             Apgars    Living status: Living   1 Minute 5 Minute 10 Minute 15 Minute 20 Minute   Skin color: 0  1       Heart rate: 2  2       Reflex irritability: 2  2       Muscle tone: 2  2       Respiratory effort: 2  2       Total: 8  9              Cord      Vessels: 3 Vessels    Cord Complications: None               Cord Blood Disposition: Discard    Gases Sent?: No    Delayed cord clamping?: Yes    Cord Clamping Delay (seconds):  seconds    Stem cell collection?: No            Resuscitation    Methods: None       Skin to Skin and Feeding Plan      Skin to skin initiation date/time: 1841    Skin to skin with: Mother  Skin to skin end date/time:            Labor Events and Shoulder Dystocia    Fetal Tracing Prior to Delivery: Category 2  Shoulder dystocia present?: Neg       Delivery (Maternal) (Provider to Complete) ([id number])    Episiotomy: None  Perineal lacerations: 2nd Repaired?: Yes   Repair suture: 3-0 Vicryl  Genital tract inspection done: Pos       Blood Loss  Mother: Lolis Johnson MARTHA #[id number]     Start of Mother's Information      Delivery Blood Loss  09/10/22 1730 - 09/10/22 2241      None                 End of  Mother's Information  Mother: Lolis Johnson #[id number]                Delivery - Provider to Complete ([id number])    Delivering clinician: Charity Arevalo MD  Attempted Delivery Types (Choose all that apply): Spontaneous Vaginal Delivery  Delivery Type (Choose the 1 that will go to the Birth History): Vaginal, Spontaneous                         Other personnel:  Provider Role   Riley Pike MD Resident   Lilli Dumont, RN Delivery Nurse   Corrina Palmer, RN Delivery Nurse                    Placenta    Date/Time: 9/10/2022 10:07 PM  Removal: Spontaneous  Disposition: Hospital disposal             Anesthesia    Method: Epidural                    Presentation and Position    Presentation: Vertex     Occiput Anterior                     Riley Pike MD

## 2022-09-11 NOTE — PLAN OF CARE
Goal Outcome Evaluation:    Plan of Care Reviewed With: patient     Overall Patient Progress: improving     AFVSS. Postpartum assessments WDL. Patient is ambulating and voiding without difficulty. Fundus firm with light flow. Using ice and tucks to perineum. States cramping pain controlled with ibuprofen. Breast feeding with minimal assistance for positioning/ reminders for deeper latch. Continue present cares.

## 2022-09-12 VITALS
DIASTOLIC BLOOD PRESSURE: 63 MMHG | RESPIRATION RATE: 16 BRPM | TEMPERATURE: 98.4 F | OXYGEN SATURATION: 99 % | SYSTOLIC BLOOD PRESSURE: 99 MMHG | HEART RATE: 72 BPM

## 2022-09-12 PROCEDURE — 250N000013 HC RX MED GY IP 250 OP 250 PS 637: Performed by: STUDENT IN AN ORGANIZED HEALTH CARE EDUCATION/TRAINING PROGRAM

## 2022-09-12 RX ADMIN — IBUPROFEN 800 MG: 800 TABLET, FILM COATED ORAL at 00:50

## 2022-09-12 RX ADMIN — LEVOTHYROXINE SODIUM 100 MCG: 50 TABLET ORAL at 06:14

## 2022-09-12 RX ADMIN — DOCUSATE SODIUM 100 MG: 100 CAPSULE, LIQUID FILLED ORAL at 10:50

## 2022-09-12 RX ADMIN — ACETAMINOPHEN 650 MG: 325 TABLET, FILM COATED ORAL at 05:30

## 2022-09-12 RX ADMIN — ACETAMINOPHEN 650 MG: 325 TABLET, FILM COATED ORAL at 00:50

## 2022-09-12 RX ADMIN — OMEPRAZOLE 40 MG: 20 CAPSULE, DELAYED RELEASE ORAL at 10:50

## 2022-09-12 RX ADMIN — IBUPROFEN 800 MG: 800 TABLET, FILM COATED ORAL at 06:14

## 2022-09-12 ASSESSMENT — ACTIVITIES OF DAILY LIVING (ADL)
ADLS_ACUITY_SCORE: 18

## 2022-09-12 NOTE — PROGRESS NOTES
Patient discharged to home with significant other and infant. Discharge instructions given and signed, patient report having her own pump and decline pump provided from hospital. Patient's assessments was WNL and reports no pain. Transport took patient to vehicle.    Janis Thomas RN

## 2022-09-12 NOTE — PLAN OF CARE
Data: Vital signs within normal limits. Postpartum checks within normal limits - see flow record. Patient eating and drinking normally. Patient able to empty bladder independently and is up ambulating. No apparent signs of infection.  Perineum  healing well. Patient performing self cares and is able to care for infant.  Action: Patient medicated during the shift for cramping. See MAR. Patient reassessed within 1 hour after each medication and pain was improved - patient stated she was comfortable. Patient education done about cramping, postpartum recovery, lanolin cream, hand expression, assisting with deeper latch, hydrogel. See flow record.  Response: Positive attachment behaviors observed with infant. Support persons are present.   Plan: Anticipate discharge on Monday.Goal Outcome Evaluation:    Plan of Care Reviewed With: patient, spouse

## 2022-09-12 NOTE — PLAN OF CARE
Goal Outcome Evaluation:      VSS and assessment are WDL. Fundus midline, firm, and U/2. Lochia is scant. Pain adequately managed with tylenol and ibuprofen. Able to void. Second degree laceration . Breastfeeding going well so far. Bonding well with . Continue with plan of care.

## 2022-09-13 ENCOUNTER — PATIENT OUTREACH (OUTPATIENT)
Dept: CARE COORDINATION | Facility: CLINIC | Age: 37
End: 2022-09-13

## 2022-09-13 NOTE — PROGRESS NOTES
Clinic Care Coordination Contact  Community Health Worker Initial Outreach    Patient accepts CC:No, Pt declined needs for extra support.     Clinic Care Coordination Contact  [facility]: Post-Discharge Note  SITUATION                                                      Admission:    Admission Date: 09/10/22   Reason for Admission: Intrauterine pregnancy at 39w2d   - Hypothyroidism   - Asthma  - COVID positive diagnosed on admission, asymptomatic  Discharge:   Discharge Date: 22  Discharge Diagnosis: Intrauterine pregnancy at 39w2d   - Hypothyroidism   - Asthma  - COVID positive diagnosed on admission, asymptomatic    BACKGROUND                                                      Per hospital discharge summary and inpatient provider notes:    Lolis Johnson is a 37 year old  at 39w2d by LMP c/w 8w3d US who presents today for SROM. Rupture occurred at 1230 for yellow fluid without odor.    ASSESSMENT         Discharge Assessment  How are you doing now that you are home?: doing well  How are your symptoms? (Red Flag symptoms escalate to triage hotline per guidelines): Improved  Do you feel your condition is stable enough to be safe at home until your provider visit?: Yes  Does the patient have their discharge instructions? : Yes  Does the patient have questions regarding their discharge instructions? : No  Were you started on any new medications or were there changes to any of your previous medications? : Yes  Does the patient have all of their medications?: Yes  Do you have questions regarding any of your medications? : No  Do you have all of your needed medical supplies or equipment (DME)?  (i.e. oxygen tank, CPAP, cane, etc.): Yes  Discharge follow-up appointment scheduled within 14 calendar days? : No  Is patient agreeable to assistance with scheduling? : No (Pt scheduled a follow upa appt for her baby, planning to call clinic to schedule appt)    Post-op ([facility] Only)  If the patient had a  surgery or procedure, do they have any questions for a nurse?: No         PLAN                                                      Outpatient Plan:      No future appointments.    Follow Up  Follow up with provider in 2 weeks and 6 weeks for post-delivery checks    For any urgent concerns, please contact our 24 hour nurse triage line: [phone] ([phone])       MONSE Armendariz  [phone]  [facility]

## 2022-09-14 NOTE — PROGRESS NOTES
"Post Partum Progress Note  PPD#2    Subjective:  Pain is improving and well controlled on current medication regimen. She is tolerating PO intake. Lochia present and normal.  She is voiding without difficulty. She has passed flatus. She is ambulating without dizziness or difficulty.  She denies headache, changes in vision, nausea/vomiting, chest pain, shortness of breath, RUQ pain, or worsening edema.  Plans to breastfeed and it is going well.     Objective:  Vital signs:  Temp: 98.4  F (36.9  C) Temp src: Oral BP: 99/63 Pulse: 72   Resp: 16   O2 Device: None (Room air)        Estimated body mass index is 29.98 kg/m  as calculated from the following:    Height as of 22: 1.753 m (5' 9\").    Weight as of 22: 92.1 kg (203 lb).    General: NAD, resting comfortably  CV: Regular rate, well perfused.   Pulm: Normal respiratory effort.  Abd: Soft, non-tender, non-distended. Fundus is firm and below the umbilicus.    Ext: No lower extremity edema bilaterally. No calf tenderness.    Assessment/Plan:  Lolis Johnson is a 37 year old  female who is  PPD#2 s/p . Meeting goals for discharge home and planning on going home today.     # Hypothyroidism  - Continue  mcg levothyroxine  - Prepregnancy dose 100 mcg     # Asthma  - Continue PTA fluticasone and albuterol    # Covid   - Positive   - Asymptomatic at this time    # Postpartum:  - Encourage routine post-partum goals including ambulation   - PNC: Rh pos. Rubella immune. No intervention indicated.  - Pain: Controlled on oral medications  - Heme: Hgb 11.6>>11.4.  - GI: Continue anti-emetics and stool softeners as needed.  - : Voiding well.  - Infant: Stable in room  - Feeding: Plans on breastfeeding.  - BC: Still thinking about contraception, would like to discuss at 6 week visit    Anticipate discharge to home on PPD#2    Hemoglobin   Date Value Ref Range Status   2022 11.4 (L) 11.7 - 15.7 g/dL Final   09/10/2022 11.6 (L) 11.7 - 15.7 " g/dL Final   2021 12.2 11.7 - 15.7 g/dL Final   2019 10.6 (L) 11.7 - 15.7 g/dL Final     Tracy Beltran MD  OB/GYN PGY-1    BP 99/63 (BP Location: Left arm, Patient Position: Semi-Kelsey's, Cuff Size: Adult Regular)   Pulse 72   Temp 98.4  F (36.9  C) (Oral)   Resp 16   LMP 2021   SpO2 99%   Breastfeeding Unknown   Hemoglobin   Date Value Ref Range Status   2022 11.4 (L) 11.7 - 15.7 g/dL Final           The patient was seen and examined by me separately from the team.  I have reviewed and agree with the above note.  She is doing well this morning.  No covid symptoms-had covid over Memorial Day, no known covid positive contacts recently.  Planning to go home today as long as baby is able.  Plan routine postpartum follow up in 2 and 6 weeks.     Samantha Ordonez MD, FACOG      Physician Attestation   I personally examined and evaluated this patient.  I discussed the patient with the resident/fellow and care team, and agree with the assessment and plan of care as documented in the note of 9/10/22.      I personally reviewed vital signs, medications, labs and exam.    Key findings: 37 year old  on PPD 2 s/p . Incidental asymptomatic COVID pos.   Reviewed discharge instructions including activity restrictions, pelvic rest and follow up plan. Reviewed signs of excessive bleeding, infection, postpartum pre-eclampsia, mastitis, DVT and postpartum depression - instructed patient to call if concerned about any of these or other concerns. Patient to follow up in 6 weeks for routine postpartum visit, undecided for contraception. All questions answered.    Melisa Acosta MD  Date of Service (when I saw the patient): 22     none

## 2022-10-04 PROBLEM — O35.10X0 MATERNAL CARE FOR SUSPECTED CHROMOSOMAL ABNORMALITY IN FETUS: Status: ACTIVE | Noted: 2021-02-16

## 2022-10-15 NOTE — NURSING NOTE
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 See genetic counseling dictation.  After consent signed and TimeOut completed, Dr. Kebede withdrew adequate villi x1 transabdominal pass.  Maternal cell contamination bloodwork drawn.  Pt is RH positive.  MD reviewed blood type and screen.Discharge teaching completed and questions answered.   Patient reports no pain.  Pt discharged ambulatory and stable.  Charge tech pager called to notify of specimen, specimen transported to [facility] and handed off to [facility].  Work-aid completed, signed and accompanied specimen.  Asuncion Arroyo RN      
70

## 2022-10-27 ENCOUNTER — PRENATAL OFFICE VISIT (OUTPATIENT)
Dept: OBGYN | Facility: CLINIC | Age: 37
End: 2022-10-27
Payer: COMMERCIAL

## 2022-10-27 VITALS
BODY MASS INDEX: 25.53 KG/M2 | OXYGEN SATURATION: 99 % | SYSTOLIC BLOOD PRESSURE: 103 MMHG | DIASTOLIC BLOOD PRESSURE: 63 MMHG | HEART RATE: 66 BPM | WEIGHT: 172.9 LBS

## 2022-10-27 DIAGNOSIS — Z12.4 SCREENING FOR CERVICAL CANCER: ICD-10-CM

## 2022-10-27 DIAGNOSIS — E03.8 SUBCLINICAL HYPOTHYROIDISM: ICD-10-CM

## 2022-10-27 PROCEDURE — 84443 ASSAY THYROID STIM HORMONE: CPT | Performed by: OBSTETRICS & GYNECOLOGY

## 2022-10-27 PROCEDURE — 99207 PR POST PARTUM EXAM: CPT | Performed by: OBSTETRICS & GYNECOLOGY

## 2022-10-27 PROCEDURE — 36415 COLL VENOUS BLD VENIPUNCTURE: CPT | Performed by: OBSTETRICS & GYNECOLOGY

## 2022-10-27 PROCEDURE — 87624 HPV HI-RISK TYP POOLED RSLT: CPT | Performed by: OBSTETRICS & GYNECOLOGY

## 2022-10-27 PROCEDURE — 84439 ASSAY OF FREE THYROXINE: CPT | Performed by: OBSTETRICS & GYNECOLOGY

## 2022-10-27 PROCEDURE — G0123 SCREEN CERV/VAG THIN LAYER: HCPCS | Performed by: OBSTETRICS & GYNECOLOGY

## 2022-10-27 RX ORDER — ACETAMINOPHEN AND CODEINE PHOSPHATE 120; 12 MG/5ML; MG/5ML
0.35 SOLUTION ORAL DAILY
Qty: 84 TABLET | Refills: 3 | Status: SHIPPED | OUTPATIENT
Start: 2022-10-27 | End: 2023-11-16

## 2022-10-27 ASSESSMENT — ANXIETY QUESTIONNAIRES
1. FEELING NERVOUS, ANXIOUS, OR ON EDGE: NOT AT ALL
GAD7 TOTAL SCORE: 0
2. NOT BEING ABLE TO STOP OR CONTROL WORRYING: NOT AT ALL
GAD7 TOTAL SCORE: 0
3. WORRYING TOO MUCH ABOUT DIFFERENT THINGS: NOT AT ALL
7. FEELING AFRAID AS IF SOMETHING AWFUL MIGHT HAPPEN: NOT AT ALL
5. BEING SO RESTLESS THAT IT IS HARD TO SIT STILL: NOT AT ALL
IF YOU CHECKED OFF ANY PROBLEMS ON THIS QUESTIONNAIRE, HOW DIFFICULT HAVE THESE PROBLEMS MADE IT FOR YOU TO DO YOUR WORK, TAKE CARE OF THINGS AT HOME, OR GET ALONG WITH OTHER PEOPLE: NOT DIFFICULT AT ALL
6. BECOMING EASILY ANNOYED OR IRRITABLE: NOT AT ALL

## 2022-10-27 ASSESSMENT — ASTHMA QUESTIONNAIRES
ACT_TOTALSCORE: 25
QUESTION_3 LAST FOUR WEEKS HOW OFTEN DID YOUR ASTHMA SYMPTOMS (WHEEZING, COUGHING, SHORTNESS OF BREATH, CHEST TIGHTNESS OR PAIN) WAKE YOU UP AT NIGHT OR EARLIER THAN USUAL IN THE MORNING: NOT AT ALL
QUESTION_2 LAST FOUR WEEKS HOW OFTEN HAVE YOU HAD SHORTNESS OF BREATH: NOT AT ALL
QUESTION_5 LAST FOUR WEEKS HOW WOULD YOU RATE YOUR ASTHMA CONTROL: COMPLETELY CONTROLLED
QUESTION_4 LAST FOUR WEEKS HOW OFTEN HAVE YOU USED YOUR RESCUE INHALER OR NEBULIZER MEDICATION (SUCH AS ALBUTEROL): NOT AT ALL
ACT_TOTALSCORE: 25
QUESTION_1 LAST FOUR WEEKS HOW MUCH OF THE TIME DID YOUR ASTHMA KEEP YOU FROM GETTING AS MUCH DONE AT WORK, SCHOOL OR AT HOME: NONE OF THE TIME

## 2022-10-27 ASSESSMENT — PATIENT HEALTH QUESTIONNAIRE - PHQ9
5. POOR APPETITE OR OVEREATING: NOT AT ALL
SUM OF ALL RESPONSES TO PHQ QUESTIONS 1-9: 1

## 2022-10-27 NOTE — PROGRESS NOTES
SUBJECTIVE:  Lolis Johnson is a 37 year old female  here for a postpartum visit.  She had a  on 9/10/22 delivering a healthy baby girl weighing 8 lbs 3 oz at term.      delivery complications:  No  breast feeding:  Yes, good supply   bladder problems:  No but if she has to pee, will need to go within 5-10 min.  Urge issues.    bowel problems/hemorrhoids:  No  episiotomy/laceration/incision healed? Yes   vaginal flow:  None  [place]:  No  contraception:  oral contraceptive  emotional adjustment:  doing well, happy and tired     Lab Results   Component Value Date    PAP NIL 2017    PAP NIL 2014        PHQ 2018   PHQ-9 Total Score 0 0   Q9: Thoughts of better off dead/self-harm past 2 weeks Not at all Not at all       NATHALIA-7 SCORE 2021   Total Score 0           OBJECTIVE:  Blood pressure 103/63, pulse 66, weight 78.4 kg (172 lb 14.4 oz), last menstrual period 2021, SpO2 99 %, currently breastfeeding.   General - pleasant female in no acute distress.  Breast - no nodularity, asymmetry or nipple discharge bilaterally.  Abdomen - soft, nontender, nondistended, no hepatosplenomegaly.  Pelvic - EG: normal adult female, perineum healing well, but can still feel stitches under the mucosa. BUS: within normal limits, Vagina: well rugated, no discharge, Cervix: no lesions or CMT, Uterus: firm, normal sized and nontender, Adnexae: no masses or tenderness.  Rectovaginal - deferred.    ASSESSMENT:  normal postpartum exam  Encounter Diagnoses   Name Primary?     Routine postpartum follow-up Yes     Screening for cervical cancer      Subclinical hypothyroidism         PLAN:  Avoid intercourse for 1-2 more weeks due to allowing the perineum to heal fully.   May resume normal activities without restrictions  Pap smear was  done today  labs - TSH   Reviewed contraception, family planning and breast feeding expectations.   The patient will use oral contraceptive and condoms for birth  control. Full counseling was provided, and all questions answered.   Return to clinic in one year for an annual, when due for a pap smear or PRN.    Corrine Hinkle MD

## 2022-10-28 LAB
T4 FREE SERPL-MCNC: 1.38 NG/DL (ref 0.76–1.46)
TSH SERPL DL<=0.005 MIU/L-ACNC: 0.05 MU/L (ref 0.4–4)

## 2022-11-01 LAB
BKR LAB AP GYN ADEQUACY: NORMAL
BKR LAB AP GYN INTERPRETATION: NORMAL
BKR LAB AP HPV REFLEX: NORMAL
BKR LAB AP PREVIOUS ABNORMAL: NORMAL
PATH REPORT.COMMENTS IMP SPEC: NORMAL
PATH REPORT.COMMENTS IMP SPEC: NORMAL
PATH REPORT.RELEVANT HX SPEC: NORMAL

## 2022-11-02 LAB
HUMAN PAPILLOMA VIRUS 16 DNA: NEGATIVE
HUMAN PAPILLOMA VIRUS 18 DNA: NEGATIVE
HUMAN PAPILLOMA VIRUS FINAL DIAGNOSIS: NORMAL
HUMAN PAPILLOMA VIRUS OTHER HR: NEGATIVE

## 2022-11-03 ENCOUNTER — PATIENT OUTREACH (OUTPATIENT)
Dept: OBGYN | Facility: CLINIC | Age: 37
End: 2022-11-03

## 2022-11-29 ENCOUNTER — MYC MEDICAL ADVICE (OUTPATIENT)
Dept: OBGYN | Facility: CLINIC | Age: 37
End: 2022-11-29

## 2022-12-05 NOTE — TELEPHONE ENCOUNTER
Did not see this in your basket? Any way you signed this and we can send it out??    Mary Ferrari RN

## 2023-01-13 DIAGNOSIS — E03.8 SUBCLINICAL HYPOTHYROIDISM: ICD-10-CM

## 2023-01-13 RX ORDER — LEVOTHYROXINE SODIUM 100 UG/1
100 TABLET ORAL DAILY
Qty: 90 TABLET | Refills: 3 | Status: SHIPPED | OUTPATIENT
Start: 2023-01-13 | End: 2023-01-16

## 2023-01-13 NOTE — TELEPHONE ENCOUNTER
Synthroid        Last written prescription date: 7/7/2022        Last fill quantity: 90, # refills: 1        Last office visit: 1120/2022        Future office visit: 1/19/23        Is this a controlled substance?  No

## 2023-01-14 ENCOUNTER — MYC MEDICAL ADVICE (OUTPATIENT)
Dept: OBGYN | Facility: CLINIC | Age: 38
End: 2023-01-14

## 2023-01-14 DIAGNOSIS — E03.8 SUBCLINICAL HYPOTHYROIDISM: ICD-10-CM

## 2023-01-16 RX ORDER — LEVOTHYROXINE SODIUM 100 UG/1
100 TABLET ORAL DAILY
Qty: 90 TABLET | Refills: 0 | Status: SHIPPED | OUTPATIENT
Start: 2023-01-16 | End: 2023-04-12

## 2023-01-16 NOTE — TELEPHONE ENCOUNTER
Requested Prescriptions   Pending Prescriptions Disp Refills     levothyroxine (SYNTHROID/LEVOTHROID) 100 MCG tablet 90 tablet 3     Sig: Take 1 tablet (100 mcg) by mouth daily       There is no refill protocol information for this order        Refill sent to wrong pharmacy.  Resent to preferred.  Lenore Salvador, RN on 1/16/2023 at 6:55 AM

## 2023-01-19 ENCOUNTER — OFFICE VISIT (OUTPATIENT)
Dept: OBGYN | Facility: CLINIC | Age: 38
End: 2023-01-19
Payer: COMMERCIAL

## 2023-01-19 ENCOUNTER — MEDICAL CORRESPONDENCE (OUTPATIENT)
Dept: OBGYN | Facility: CLINIC | Age: 38
End: 2023-01-19

## 2023-01-19 VITALS
WEIGHT: 175 LBS | HEART RATE: 83 BPM | DIASTOLIC BLOOD PRESSURE: 65 MMHG | BODY MASS INDEX: 25.84 KG/M2 | SYSTOLIC BLOOD PRESSURE: 103 MMHG | TEMPERATURE: 97.4 F

## 2023-01-19 DIAGNOSIS — Z01.419 ENCOUNTER FOR GYNECOLOGICAL EXAMINATION WITHOUT ABNORMAL FINDING: Primary | ICD-10-CM

## 2023-01-19 DIAGNOSIS — Z12.4 SCREENING FOR MALIGNANT NEOPLASM OF CERVIX: ICD-10-CM

## 2023-01-19 DIAGNOSIS — E03.8 SUBCLINICAL HYPOTHYROIDISM: ICD-10-CM

## 2023-01-19 PROBLEM — O35.EXX0 PYELECTASIS OF FETUS ON PRENATAL ULTRASOUND: Status: RESOLVED | Noted: 2022-08-12 | Resolved: 2023-01-19

## 2023-01-19 LAB — TSH SERPL DL<=0.005 MIU/L-ACNC: 0.77 UIU/ML (ref 0.3–4.2)

## 2023-01-19 PROCEDURE — 99395 PREV VISIT EST AGE 18-39: CPT | Performed by: OBSTETRICS & GYNECOLOGY

## 2023-01-19 PROCEDURE — 84443 ASSAY THYROID STIM HORMONE: CPT | Performed by: OBSTETRICS & GYNECOLOGY

## 2023-01-19 PROCEDURE — 87624 HPV HI-RISK TYP POOLED RSLT: CPT | Performed by: OBSTETRICS & GYNECOLOGY

## 2023-01-19 PROCEDURE — G0145 SCR C/V CYTO,THINLAYER,RESCR: HCPCS | Performed by: OBSTETRICS & GYNECOLOGY

## 2023-01-19 PROCEDURE — 36415 COLL VENOUS BLD VENIPUNCTURE: CPT | Performed by: OBSTETRICS & GYNECOLOGY

## 2023-01-19 NOTE — PROGRESS NOTES
Lolis is a 37 year old  female who presents for annual exam.     Menses are pt is breastfeeding and  lasting  days.  Menses flow: .  No LMP recorded (lmp unknown).. Using oral contraceptives for contraception.  She is not currently considering pregnancy.  Besides routine health maintenance, she has no other health concerns today . Still breastfeeding.   Last TSH was low 10/2022. Last pap was unsatisfactory.   GYNECOLOGIC HISTORY:  Menarche: [age]  Age at first intercourse: 23 Number of lifetime partners: 1  Lolis is sexually active with 1male partner(s) and is currently in monogamous relationship .    History sexually transmitted infections:No STD history  STI testing offered?  Declined  TEO exposure: No  History of abnormal Pap smear: NO - age 30- 65 PAP every 3 years recommended  Family history of breast CA: Yes (Please explain): pgmo  Family history of uterine/ovarian CA: No    Family history of colon CA: No    HEALTH MAINTENANCE:  Cholesterol: (No results found for: CHOL History of abnormal lipids: No  Mammo: no . History of abnormal Mammo: Not applicable.  Regular Self Breast Exams: Yes  Calcium/Vitamin D intake: source:  dairy, dietary supplement(s) Adequate? Yes  TSH: (  TSH   Date Value Ref Range Status   10/27/2022 0.05 (L) 0.40 - 4.00 mU/L Final   2021 2.08 0.40 - 4.00 mU/L Final    )  Pap; (  Lab Results   Component Value Date    PAP NIL 2017    PAP NIL 2014    )    HISTORY:  OB History    Para Term  AB Living   4 2 2 0 2 2   SAB IAB Ectopic Multiple Live Births   1 1 0 1 2      # Outcome Date GA Lbr Jose/2nd Weight Sex Delivery Anes PTL Lv   4 Term 09/10/22 39w2d 04:22 / 00:12 3.72 kg (8 lb 3.2 oz) F Vag-Spont EPI N MARCO      Complications: Infection due to 2019 novel coronavirus      Name: JOHNFEMALE-LOLIS      Apgar1: 8  Apgar5: 9   3 IAB 21 12w5d    AB, COMPLETE      2 Term 19 39w1d 02:15 / 01:36 3.26 kg (7 lb 3 oz) F Vag-Spont EPI, Local  MARCO       Name: CATALINO LOPEZ      Apgar1: 8  Apgar5: 9   1A SAB 06/21/17 19w1d  0.278 kg (9.8 oz) M Vag-Spont Nitrous, IV Y FD      Complications: Prolonged PROM (>18 hours)      Name: JERONIMO LOPEZ FD      Apgar1: 0  Apgar5: 0   1B SAB 06/21/17 19w1d  0.188 kg (6.6 oz) F Vag-Spont IV, Nitrous Y FD      Complications: Prolonged PROM (>18 hours)      Name: SAE LOPEZ FD      Apgar1: 0  Apgar5: 0      Obstetric Comments   Lia Carrera     Past Medical History:   Diagnosis Date     Infertility, female      Subclinical hypothyroidism 4/29/2016     Varicella      Past Surgical History:   Procedure Laterality Date     DILATION AND EVACUATION N/A 2/18/2021    Procedure: DILATION AND CURETTAGE, UTERUS;  Surgeon: Christiane Lu MD;  Location: [facility] OR     SINUS SURGERY       Family History   Problem Relation Age of Onset     Breast Cancer Paternal Grandmother      Social History     Socioeconomic History     Marital status:    Tobacco Use     Smoking status: Never     Smokeless tobacco: Never   Vaping Use     Vaping Use: Never used   Substance and Sexual Activity     Alcohol use: Not Currently     Comment: occ     Drug use: No     Sexual activity: Yes     Partners: Male     Birth control/protection: None   Other Topics Concern     Parent/sibling w/ CABG, MI or angioplasty before 65F 55M? No       Current Outpatient Medications:      albuterol (PROAIR HFA/PROVENTIL HFA/VENTOLIN HFA) 108 (90 Base) MCG/ACT inhaler, Inhale 2 puffs into the lungs every 4 hours as needed, Disp: , Rfl:      levothyroxine (SYNTHROID/LEVOTHROID) 100 MCG tablet, Take 1 tablet (100 mcg) by mouth daily, Disp: 90 tablet, Rfl: 0     norethindrone (MICRONOR) 0.35 MG tablet, Take 1 tablet (0.35 mg) by mouth daily, Disp: 84 tablet, Rfl: 3     Prenatal Vit-DSS-Fe Cbn-FA (PRENATAL AD PO), , Disp: , Rfl:      Allergies   Allergen Reactions     Sulfa Drugs      Sulfasalazine Unknown       Past medical, surgical, social and family history were  reviewed and updated in EPIC.    ROS:   C:     NEGATIVE for fever, chills, change in weight  I:       NEGATIVE for worrisome rashes, moles or lesions  E:     NEGATIVE for vision changes or irritation  E/M: NEGATIVE for ear, mouth and throat problems  R:     NEGATIVE for significant cough or SOB  CV:   NEGATIVE for chest pain, palpitations or peripheral edema  GI:     NEGATIVE for nausea, abdominal pain, heartburn, or change in bowel habits  :   NEGATIVE for frequency, dysuria, hematuria, vaginal discharge, or irregular bleeding  M:     NEGATIVE for significant arthralgias or myalgia  N:      NEGATIVE for weakness, dizziness or paresthesias  E:      NEGATIVE for temperature intolerance, skin/hair changes  P:      NEGATIVE for changes in mood or affect.    EXAM:  /65   Pulse 83   Temp 97.4  F (36.3  C)   Wt 79.4 kg (175 lb)   LMP  (LMP Unknown)   Breastfeeding Yes   BMI 25.84 kg/m     BMI: Body mass index is 25.84 kg/m .  Constitutional: healthy, alert and no distress  Head: Normocephalic. No masses, lesions, tenderness or abnormalities  Neck: Neck supple. Trachea midline. No adenopathy. Thyroid symmetric, normal size.   Cardiovascular: RRR.   Respiratory: Negative.   Breast: Breasts reveal mild symmetric fibrocystic densities, but there are no dominant, discrete, fixed or suspicious masses found.  Gastrointestinal: Abdomen soft, non-tender, non-distended. No masses, organomegaly.  :  Vulva:  No external lesions, normal female hair distribution, no inguinal adenopathy.    Urethra:  Midline, non-tender, well supported, no discharge  Vagina:  Moist, pink, no abnormal discharge, no lesions  Uterus:  Normal size, anteverted , non-tender, freely mobile  Ovaries:  No masses appreciated, non-tender, mobile  Rectal Exam: deferred  Musculoskeletal: extremities normal  Skin: no suspicious lesions or rashes  Psychiatric: Affect appropriate, cooperative,mentation appears normal.     COUNSELING:   Reviewed  preventive health counseling, as reflected in patient instructions       Regular exercise       Healthy diet/nutrition       Contraception   reports that she has never smoked. She has never used smokeless tobacco.    Body mass index is 25.84 kg/m .    FRAX Risk Assessment    ASSESSMENT:  37 year old female with satisfactory annual exam  (Z01.419) Encounter for gynecological examination without abnormal finding  (primary encounter diagnosis)  Comment:   Plan: Pap today     (Z12.4) Screening for malignant neoplasm of cervix  Comment:   Plan: Pap imaged thin layer screen with HPV -         recommended age 30 - 65 years (select HPV order        below)            (E03.8) Subclinical hypothyroidism  Comment: routine monitoring of stable condition.  Plan: TSH with free T4 reflex        If normal will give year supply. Otherwise will adjust meds and plan follow-up.

## 2023-01-19 NOTE — Clinical Note
Hi,  It looks like this patient's thyroid result from October was never reviewed. It was at least never commented on. It's not a big deal, though it wasn't a normal result.  I'm not sure if it was regarded as normal, or ok and clicked as done or if it is still sitting with a bunch of other unreviewed results.   Christiane

## 2023-01-26 PROBLEM — R87.615 UNSATISFACTORY CERVICAL PAPANICOLAOU SMEAR: Status: ACTIVE | Noted: 2022-10-27

## 2023-03-10 ENCOUNTER — MEDICAL CORRESPONDENCE (OUTPATIENT)
Dept: HEALTH INFORMATION MANAGEMENT | Facility: CLINIC | Age: 38
End: 2023-03-10

## 2023-04-11 DIAGNOSIS — E03.8 SUBCLINICAL HYPOTHYROIDISM: ICD-10-CM

## 2023-04-11 NOTE — CONFIDENTIAL NOTE
Last OV 1/19/23  Last TSH 1/19/23 - normal results    Does she need a repeat lab prior to refill?    Mary Ferrari RN

## 2023-04-12 RX ORDER — LEVOTHYROXINE SODIUM 100 UG/1
100 TABLET ORAL DAILY
Qty: 90 TABLET | Refills: 0 | Status: SHIPPED | OUTPATIENT
Start: 2023-04-12 | End: 2023-07-12

## 2023-07-12 ENCOUNTER — TELEPHONE (OUTPATIENT)
Dept: OBGYN | Facility: CLINIC | Age: 38
End: 2023-07-12
Payer: COMMERCIAL

## 2023-07-12 DIAGNOSIS — E03.8 SUBCLINICAL HYPOTHYROIDISM: ICD-10-CM

## 2023-07-12 RX ORDER — LEVOTHYROXINE SODIUM 100 UG/1
100 TABLET ORAL DAILY
Qty: 90 TABLET | Refills: 2 | Status: SHIPPED | OUTPATIENT
Start: 2023-07-12 | End: 2023-11-30

## 2023-07-12 NOTE — TELEPHONE ENCOUNTER
Requested Prescriptions   Pending Prescriptions Disp Refills     levothyroxine (SYNTHROID/LEVOTHROID) 100 MCG tablet 90 tablet 0     Sig: Take 1 tablet (100 mcg) by mouth daily       There is no refill protocol information for this order        Last office visit 1/19/23, TSH normal at that time.     Refill given for a year per note from office visit.     IREEN Lan

## 2023-11-16 ENCOUNTER — OFFICE VISIT (OUTPATIENT)
Dept: OBGYN | Facility: CLINIC | Age: 38
End: 2023-11-16
Payer: COMMERCIAL

## 2023-11-16 VITALS
BODY MASS INDEX: 25.25 KG/M2 | HEART RATE: 77 BPM | TEMPERATURE: 98.7 F | DIASTOLIC BLOOD PRESSURE: 65 MMHG | SYSTOLIC BLOOD PRESSURE: 108 MMHG | WEIGHT: 171 LBS

## 2023-11-16 DIAGNOSIS — Z23 HIGH PRIORITY FOR 2019-NCOV VACCINE: ICD-10-CM

## 2023-11-16 DIAGNOSIS — Z13.220 LIPID SCREENING: ICD-10-CM

## 2023-11-16 DIAGNOSIS — E03.8 SUBCLINICAL HYPOTHYROIDISM: ICD-10-CM

## 2023-11-16 DIAGNOSIS — Z01.419 ENCOUNTER FOR GYNECOLOGICAL EXAMINATION WITHOUT ABNORMAL FINDING: Primary | ICD-10-CM

## 2023-11-16 PROCEDURE — 91320 SARSCV2 VAC 30MCG TRS-SUC IM: CPT | Performed by: OBSTETRICS & GYNECOLOGY

## 2023-11-16 PROCEDURE — 90480 ADMN SARSCOV2 VAC 1/ONLY CMP: CPT | Performed by: OBSTETRICS & GYNECOLOGY

## 2023-11-16 PROCEDURE — 99395 PREV VISIT EST AGE 18-39: CPT | Performed by: OBSTETRICS & GYNECOLOGY

## 2023-11-16 RX ORDER — IPRATROPIUM BROMIDE 42 UG/1
SPRAY, METERED NASAL
COMMUNITY
Start: 2023-11-13 | End: 2023-11-30

## 2023-11-16 RX ORDER — FLUTICASONE PROPIONATE 50 MCG
SPRAY, SUSPENSION (ML) NASAL
COMMUNITY
Start: 2023-05-04

## 2023-11-16 NOTE — PROGRESS NOTES
"Lolis is a 38 year old  female who presents for annual exam.     Menses are regular q 28-30 days and normal lasting 5 days.  Menses flow: normal.  Patient's last menstrual period was 10/16/2023.. Using none for contraception.  She is not currently considering pregnancy.  Besides routine health maintenance,  she would like to discuss weight gain, blood work.  Has been having a hard time losing weight after last delivery. Was 162 pounds at start of pregnancy, 203 at delivery, 175 in January, 171 today.   GYNECOLOGIC HISTORY:  Menarche: 17-16  Age at first intercourse: 23 Number of lifetime partners: 1  Lolis is sexually active with 1 male partner(s) and is currently in monogamous relationship.    History sexually transmitted infections:No STD history  STI testing offered?  Declined  TEO exposure: No  History of abnormal Pap smear: NO - age 30- 65 PAP every 3 years recommended  Family history of breast CA: Yes (Please explain): pgmo  Family history of uterine/ovarian CA: No    Family history of colon CA: No    HEALTH MAINTENANCE:  Cholesterol: (No results found for: \"CHOL\" History of abnormal lipids: No  Mammo: no . History of abnormal Mammo: No.  Regular Self Breast Exams: No  Calcium/Vitamin D intake: source:  dairy, dietary supplement(s) Adequate? Yes  TSH: (  TSH   Date Value Ref Range Status   2023 0.77 0.30 - 4.20 uIU/mL Final   10/27/2022 0.05 (L) 0.40 - 4.00 mU/L Final   2021 2.08 0.40 - 4.00 mU/L Final    )  Pap; (  Lab Results   Component Value Date    PAP NIL 2017    PAP NIL 2014    )    HISTORY:  OB History    Para Term  AB Living   4 2 2 0 2 2   SAB IAB Ectopic Multiple Live Births   1 1 0 1 2      # Outcome Date GA Lbr Jose/2nd Weight Sex Delivery Anes PTL Lv   4 Term 09/10/22 39w2d 04:22 / 00:12 3.72 kg (8 lb 3.2 oz) F Vag-Spont EPI N MARCO      Complications: Infection due to 2019 novel coronavirus      Name: JOHNFEMALE-LOLIS      Apgar1: 8  Apgar5: 9   3 IAB " 02/16/21 12w5d    AB, COMPLETE      2 Term 04/04/19 39w1d 02:15 / 01:36 3.26 kg (7 lb 3 oz) F Vag-Spont EPI, Local  MRACO      Name: SHANNON LOPEZ-ABIMBOLA      Apgar1: 8  Apgar5: 9   1A SAB 06/21/17 19w1d  0.278 kg (9.8 oz) M Vag-Spont Nitrous, IV Y FD      Complications: Prolonged PROM (>18 hours)      Name: JERONIMO LOPEZ FD      Apgar1: 0  Apgar5: 0   1B SAB 06/21/17 19w1d  0.188 kg (6.6 oz) F Vag-Spont IV, Nitrous Y FD      Complications: Prolonged PROM (>18 hours)      Name: SAE LOPEZ FD      Apgar1: 0  Apgar5: 0      Obstetric Comments   Lia Carrera     Past Medical History:   Diagnosis Date    Infertility, female     Subclinical hypothyroidism 4/29/2016    Varicella      Past Surgical History:   Procedure Laterality Date    DILATION AND EVACUATION N/A 2/18/2021    Procedure: DILATION AND CURETTAGE, UTERUS;  Surgeon: Christiane Lu MD;  Location: [facility] OR    SINUS SURGERY       Family History   Problem Relation Age of Onset    Breast Cancer Paternal Grandmother      Social History     Socioeconomic History    Marital status:      Spouse name: None    Number of children: None    Years of education: None    Highest education level: None   Tobacco Use    Smoking status: Never    Smokeless tobacco: Never   Vaping Use    Vaping Use: Never used   Substance and Sexual Activity    Alcohol use: Not Currently     Comment: occ    Drug use: No    Sexual activity: Yes     Partners: Male     Birth control/protection: Pill   Other Topics Concern    Parent/sibling w/ CABG, MI or angioplasty before 65F 55M? No     Social Determinants of Health     Stress: No Stress Concern Present (1/6/2021)    [place] of [employer] - Occupational Stress Questionnaire     Feeling of Stress : Not at all   Social Connections: Unknown (1/6/2021)    Social Connection and Isolation Panel [NHANES]     Frequency of Communication with Friends and Family: Not asked     Frequency of Social Gatherings with Friends  and Family: Not asked     Attends Adventism Services: Not asked     Active Member of Clubs or Organizations: Not asked     Attends Club or Organization Meetings: Not asked     Marital Status:    Interpersonal Safety: Not At Risk (1/6/2021)    Humiliation, Afraid, Rape, and Kick questionnaire     Fear of Current or Ex-Partner: No     Emotionally Abused: No     Physically Abused: No     Sexually Abused: No       Current Outpatient Medications:     albuterol (PROAIR HFA/PROVENTIL HFA/VENTOLIN HFA) 108 (90 Base) MCG/ACT inhaler, Inhale 2 puffs into the lungs every 4 hours as needed, Disp: , Rfl:     fluticasone (FLONASE) 50 MCG/ACT nasal spray, SPRAY 1 SPRAY INTO EACH NOSTRIL TWICE A DAY AS DIRECTED, Disp: , Rfl:     ipratropium (ATROVENT) 0.06 % nasal spray, SPRAY 2 SPRAYS INTO EACH NOSTRIL TWICE A DAY AS DIRECTED, Disp: , Rfl:     levothyroxine (SYNTHROID/LEVOTHROID) 100 MCG tablet, Take 1 tablet (100 mcg) by mouth daily, Disp: 90 tablet, Rfl: 2     Allergies   Allergen Reactions    Sulfa Antibiotics     Sulfasalazine Unknown       Past medical, surgical, social and family history were reviewed and updated in EPIC.    ROS:   C:     NEGATIVE for fever, chills, change in weight  I:       NEGATIVE for worrisome rashes, moles or lesions  E:     NEGATIVE for vision changes or irritation  E/M: NEGATIVE for ear, mouth and throat problems  R:     NEGATIVE for significant cough or SOB  CV:   NEGATIVE for chest pain, palpitations or peripheral edema  GI:     NEGATIVE for nausea, abdominal pain, heartburn, or change in bowel habits  :   NEGATIVE for frequency, dysuria, hematuria, vaginal discharge, or irregular bleeding  M:     NEGATIVE for significant arthralgias or myalgia  N:      NEGATIVE for weakness, dizziness or paresthesias  E:      NEGATIVE for temperature intolerance, skin/hair changes  P:      NEGATIVE for changes in mood or affect.    EXAM:  /65   Pulse 77   Temp 98.7  F (37.1  C)   Wt 77.6 kg (171  lb)   LMP 10/16/2023   Breastfeeding No   BMI 25.25 kg/m     BMI: Body mass index is 25.25 kg/m .  Constitutional: healthy, alert and no distress  Head: Normocephalic. No masses, lesions, tenderness or abnormalities  Neck: Neck supple. Trachea midline. No adenopathy. Thyroid symmetric, normal size.   Cardiovascular: RRR.   Respiratory: Negative.   Breast: Breasts reveal mild symmetric fibrocystic densities, but there are no dominant, discrete, fixed or suspicious masses found.  Gastrointestinal: Abdomen soft, non-tender, non-distended. No masses, organomegaly.  Musculoskeletal: extremities normal  Skin: no suspicious lesions or rashes  Psychiatric: Affect appropriate, cooperative,mentation appears normal.     COUNSELING:   Reviewed preventive health counseling, as reflected in patient instructions       Regular exercise       Healthy diet/nutrition       Osteoporosis prevention/bone health   reports that she has never smoked. She has never used smokeless tobacco.    Body mass index is 25.25 kg/m .  Weight management plan: Discussed healthy diet and exercise guidelines  FRAX Risk Assessment    ASSESSMENT:  38 year old female with satisfactory annual exam  (Z01.419) Encounter for gynecological examination without abnormal finding  (primary encounter diagnosis)  Comment:   Plan: UTD on HM   Discussed weight loss strategies. Active peloton user. Doing weight training. Discussed weighing portions and food tracking, will consider.     (Z23) High priority for 2019-nCoV vaccine  Comment:   Plan: COVID-19 12+ (2023-24) (PFIZER)            (E03.8) Subclinical hypothyroidism  Comment:   Plan: TSH with free T4 reflex, CANCELED: TSH with         free T4 reflex        Routine labs    (Z13.220) Lipid screening  Comment:  Plan: Lipid Profile (Chol, Trig, HDL, LDL calc)        Future when fasting.

## 2023-11-28 ENCOUNTER — LAB (OUTPATIENT)
Dept: LAB | Facility: CLINIC | Age: 38
End: 2023-11-28
Payer: COMMERCIAL

## 2023-11-28 DIAGNOSIS — Z13.220 LIPID SCREENING: ICD-10-CM

## 2023-11-28 DIAGNOSIS — E03.8 SUBCLINICAL HYPOTHYROIDISM: ICD-10-CM

## 2023-11-28 LAB
CHOLEST SERPL-MCNC: 184 MG/DL
HDLC SERPL-MCNC: 72 MG/DL
LDLC SERPL CALC-MCNC: 93 MG/DL
NONHDLC SERPL-MCNC: 112 MG/DL
TRIGL SERPL-MCNC: 95 MG/DL
TSH SERPL DL<=0.005 MIU/L-ACNC: 4.04 UIU/ML (ref 0.3–4.2)

## 2023-11-28 PROCEDURE — 84443 ASSAY THYROID STIM HORMONE: CPT

## 2023-11-28 PROCEDURE — 80061 LIPID PANEL: CPT

## 2023-11-28 PROCEDURE — 36415 COLL VENOUS BLD VENIPUNCTURE: CPT

## 2023-11-30 DIAGNOSIS — E03.8 SUBCLINICAL HYPOTHYROIDISM: ICD-10-CM

## 2023-11-30 RX ORDER — LEVOTHYROXINE SODIUM 100 UG/1
100 TABLET ORAL DAILY
Qty: 90 TABLET | Refills: 3 | Status: SHIPPED | OUTPATIENT
Start: 2023-11-30

## 2024-05-14 NOTE — PLAN OF CARE
Prior to immunization administration, verified patients identity using patient s name and date of birth. Please see Immunization Activity for additional information.     Screening Questionnaire for Adult Immunization    Are you sick today?   No   Do you have allergies to medications, food, a vaccine component or latex?   No   Have you ever had a serious reaction after receiving a vaccination?   No   Do you have a long-term health problem with heart, lung, kidney, or metabolic disease (e.g., diabetes), asthma, a blood disorder, no spleen, complement component deficiency, a cochlear implant, or a spinal fluid leak?  Are you on long-term aspirin therapy?   No   Do you have cancer, leukemia, HIV/AIDS, or any other immune system problem?   No   Do you have a parent, brother, or sister with an immune system problem?   No   In the past 3 months, have you taken medications that affect  your immune system, such as prednisone, other steroids, or anticancer drugs; drugs for the treatment of rheumatoid arthritis, Crohn s disease, or psoriasis; or have you had radiation treatments?   No   Have you had a seizure, or a brain or other nervous system problem?   No   During the past year, have you received a transfusion of blood or blood    products, or been given immune (gamma) globulin or antiviral drug?   No   For women: Are you pregnant or is there a chance you could become       pregnant during the next month?   No   Have you received any vaccinations in the past 4 weeks?   No     Immunization questionnaire answers were all negative.      Patient instructed to remain in clinic for 15 minutes afterwards, and to report any adverse reactions.     Screening performed by Zulema Brady MA on 5/14/2024 at 7:28 AM.        Problem: Prenatal Inpatient (Adult,Obstetrics,Pediatric)  Goal: Signs and Symptoms of Listed Potential Problems Will be Absent or Manageable (Prenatal Inpatient)  Signs and symptoms of listed potential problems will be absent or manageable by discharge/transition of care (reference Prenatal Inpatient (Adult,Obstetrics,Pediatric) [facility]).   Outcome: Declining  Pt becoming more uncomfortable with contractions. When up to bathroom noted bright red bleeding on pad and toilet paper. Pt doubled over with pain.  Dr notified. Continue to monitor closely.

## 2024-07-09 ENCOUNTER — OFFICE VISIT (OUTPATIENT)
Dept: OBGYN | Facility: CLINIC | Age: 39
End: 2024-07-09
Payer: COMMERCIAL

## 2024-07-09 VITALS
SYSTOLIC BLOOD PRESSURE: 113 MMHG | BODY MASS INDEX: 22.74 KG/M2 | TEMPERATURE: 97.8 F | HEART RATE: 81 BPM | WEIGHT: 154 LBS | DIASTOLIC BLOOD PRESSURE: 73 MMHG

## 2024-07-09 DIAGNOSIS — N91.1 SECONDARY AMENORRHEA: Primary | ICD-10-CM

## 2024-07-09 DIAGNOSIS — E03.8 OTHER SPECIFIED HYPOTHYROIDISM: ICD-10-CM

## 2024-07-09 DIAGNOSIS — R51.9 HEADACHE, UNSPECIFIED HEADACHE TYPE: ICD-10-CM

## 2024-07-09 LAB
ESTRADIOL SERPL-MCNC: 120 PG/ML
FSH SERPL IRP2-ACNC: 34.4 MIU/ML
HCG UR QL: NEGATIVE
LH SERPL-ACNC: 36.6 MIU/ML
PROGEST SERPL-MCNC: 0.3 NG/ML
PROLACTIN SERPL 3RD IS-MCNC: 4 NG/ML (ref 5–23)
SHBG SERPL-SCNC: 110 NMOL/L (ref 30–135)
T4 FREE SERPL-MCNC: 1.42 NG/DL (ref 0.9–1.7)
TSH SERPL DL<=0.005 MIU/L-ACNC: 2.13 UIU/ML (ref 0.3–4.2)

## 2024-07-09 PROCEDURE — 36415 COLL VENOUS BLD VENIPUNCTURE: CPT | Performed by: OBSTETRICS & GYNECOLOGY

## 2024-07-09 PROCEDURE — 84146 ASSAY OF PROLACTIN: CPT | Performed by: OBSTETRICS & GYNECOLOGY

## 2024-07-09 PROCEDURE — 84439 ASSAY OF FREE THYROXINE: CPT | Performed by: OBSTETRICS & GYNECOLOGY

## 2024-07-09 PROCEDURE — 84403 ASSAY OF TOTAL TESTOSTERONE: CPT | Performed by: OBSTETRICS & GYNECOLOGY

## 2024-07-09 PROCEDURE — 83001 ASSAY OF GONADOTROPIN (FSH): CPT | Performed by: OBSTETRICS & GYNECOLOGY

## 2024-07-09 PROCEDURE — 84443 ASSAY THYROID STIM HORMONE: CPT | Performed by: OBSTETRICS & GYNECOLOGY

## 2024-07-09 PROCEDURE — 84270 ASSAY OF SEX HORMONE GLOBUL: CPT | Performed by: OBSTETRICS & GYNECOLOGY

## 2024-07-09 PROCEDURE — 84144 ASSAY OF PROGESTERONE: CPT | Performed by: OBSTETRICS & GYNECOLOGY

## 2024-07-09 PROCEDURE — 82670 ASSAY OF TOTAL ESTRADIOL: CPT | Performed by: OBSTETRICS & GYNECOLOGY

## 2024-07-09 PROCEDURE — 86376 MICROSOMAL ANTIBODY EACH: CPT | Performed by: OBSTETRICS & GYNECOLOGY

## 2024-07-09 PROCEDURE — 99213 OFFICE O/P EST LOW 20 MIN: CPT | Performed by: OBSTETRICS & GYNECOLOGY

## 2024-07-09 PROCEDURE — 86800 THYROGLOBULIN ANTIBODY: CPT | Performed by: OBSTETRICS & GYNECOLOGY

## 2024-07-09 PROCEDURE — 83002 ASSAY OF GONADOTROPIN (LH): CPT | Performed by: OBSTETRICS & GYNECOLOGY

## 2024-07-09 PROCEDURE — 81025 URINE PREGNANCY TEST: CPT | Performed by: OBSTETRICS & GYNECOLOGY

## 2024-07-09 RX ORDER — MEDROXYPROGESTERONE ACETATE 10 MG
10 TABLET ORAL DAILY
Qty: 10 TABLET | Refills: 0 | Status: SHIPPED | OUTPATIENT
Start: 2024-07-09

## 2024-07-09 NOTE — PROGRESS NOTES
HPI:  Lolis Johnson is a 39 year old female here for a consultation regarding: no period for about 3 months    HPI:    No period for 3 months.  Also Dealing with more HA's lately.  Feels monthly PMS sx but then the past 3 months the period never would come.  She is still noticing ovulatory mucus and feels like she is ovulating.   No hot flashes or night sweats.   Other symptoms include daily HA's and will feel dizzy when she stands up too fast sometimes.   Tylenol and short nap help with HA symptoms.    No h/o gluten sensitivity that she knows of.     She took a home UPT and was negative recently.   Was not really tracking her cycles.  But thinking back it has been 3 times when her expected period did not come.     Patient takes levothyroxine. Diagnosed when she was trying to get pregnant with subclinical hypothyroidism    Does not have an endocrinologist. Sees PCP yearly for refills.     Lab Results   Component Value Date    TSH 4.04 2023    TSH 0.05 10/27/2022    TSH 2.08 2021     Wt Readings from Last 4 Encounters:   24 69.9 kg (154 lb)   23 77.6 kg (171 lb)   23 79.4 kg (175 lb)   10/27/22 78.4 kg (172 lb 14.4 oz)      Significant weight loss noted after patient left clinic today.     Period history:  Menses q monthly lasting 5 days regular and predictable.   Q 26-27 days.   LMP beginning of .       Relationship:  long term, , vasectomy.  No more children.   SH:   at [facility], no stress      No LMP recorded.     Past GYN history:   unremarkable          OB History    Para Term  AB Living   4 2 2 0 2 2   SAB IAB Ectopic Multiple Live Births   1 1 0 1 2      # Outcome Date GA Lbr Jose/2nd Weight Sex Type Anes PTL Lv   4 Term 09/10/22 39w2d 04:22 / 00:12 3.72 kg (8 lb 3.2 oz) F Vag-Spont EPI N MARCO      Complications: Infection due to 2019 novel coronavirus      Name: JOHNFEMALE-OLLIS      Apgar1: 8  Apgar5: 9   3 IAB 21 12w5d    AB,  COMPLETE      2 Term 04/04/19 39w1d 02:15 / 01:36 3.26 kg (7 lb 3 oz) F Vag-Spont EPI, Local  MARCO      Name: SHANNON LOPEZ-ABIMBOLA      Apgar1: 8  Apgar5: 9   1A SAB 06/21/17 19w1d  0.278 kg (9.8 oz) M Vag-Spont Nitrous, IV Y FD      Complications: Prolonged PROM (>18 hours)      Name: JERONIMO LOPEZ FD      Apgar1: 0  Apgar5: 0   1B SAB 06/21/17 19w1d  0.188 kg (6.6 oz) F Vag-Spont IV, Nitrous Y FD      Complications: Prolonged PROM (>18 hours)      Name: JAMAAL LOPEZ2 ABIMBOLA ACKERMAN      Apgar1: 0  Apgar5: 0      Obstetric Comments   Lia Carrera        Past Medical History:   Diagnosis Date    Infertility, female     Subclinical hypothyroidism 4/29/2016    Varicella        Past Surgical History:   Procedure Laterality Date    DILATION AND EVACUATION N/A 2/18/2021    Procedure: DILATION AND CURETTAGE, UTERUS;  Surgeon: Christiane Lu MD;  Location: [facility]    SINUS SURGERY         Family History   Problem Relation Age of Onset    Breast Cancer Paternal Grandmother          Medications:    Current Outpatient Medications:     fluticasone (FLONASE) 50 MCG/ACT nasal spray, SPRAY 1 SPRAY INTO EACH NOSTRIL TWICE A DAY AS DIRECTED, Disp: , Rfl:     levothyroxine (SYNTHROID/LEVOTHROID) 100 MCG tablet, Take 1 tablet (100 mcg) by mouth daily, Disp: 90 tablet, Rfl: 3    Allergies:  Sulfa antibiotics and Sulfasalazine    ROS:  She denies abnormal vaginal bleeding, discharge or unusual pelvic pain, no dysuria, frequency or hematuria.    EXAM:  /73   Pulse 81   Temp 97.8  F (36.6  C)   Wt 69.9 kg (154 lb)   BMI 22.74 kg/m      General - pleasant female in no acute distress.  Neurological - Alert and oriented  Psych:  normal mood and affect.  normal respiratory and cardiovascular effort   Breast - deferred.       ASSESSMENT:/PLAN:  (N91.1) Secondary amenorrhea  (primary encounter diagnosis)  Comment:  uncertain etiology   Plan: Follicle stimulating hormone, Luteinizing         Hormone, medroxyPROGESTERone (PROVERA) 10  MG         tablet, Testosterone Free and Total, Estradiol,        Progesterone, Prolactin, US Pelvic Complete         with Transvaginal, HCG qualitative urine            (E03.8) Other specified hypothyroidism  Comment:  check for AI hypothyroidism  Plan: TSH, T4, free, Thyroid peroxidase antibody,         Anti thyroglobulin antibody             (R51.9) Headache, unspecified headache type  Comment: uncertain cause   Can be associated with gluten sensitivity and AI thyroid DO   Plan: await lab results.   Could try elimination diet to see if improves.      [] labs today  [] pelvic ultrasound  [] provera challenge  [] menstrual diary   [] follow-up appt to review all results.          Corrine Hinkle MD

## 2024-07-10 LAB
THYROGLOB AB SERPL IA-ACNC: <20 IU/ML
THYROPEROXIDASE AB SERPL-ACNC: 333 IU/ML

## 2024-07-11 LAB
TESTOST FREE SERPL-MCNC: 0.15 NG/DL
TESTOST SERPL-MCNC: 20 NG/DL (ref 8–60)

## 2024-07-19 ENCOUNTER — HOSPITAL ENCOUNTER (OUTPATIENT)
Dept: ULTRASOUND IMAGING | Facility: CLINIC | Age: 39
Discharge: HOME | End: 2024-07-19
Attending: OBSTETRICS & GYNECOLOGY | Admitting: OBSTETRICS & GYNECOLOGY
Payer: COMMERCIAL

## 2024-07-19 DIAGNOSIS — N91.1 SECONDARY AMENORRHEA: ICD-10-CM

## 2024-07-19 PROCEDURE — 76830 TRANSVAGINAL US NON-OB: CPT | Mod: 26 | Performed by: RADIOLOGY

## 2024-07-19 PROCEDURE — 76856 US EXAM PELVIC COMPLETE: CPT

## 2024-07-19 PROCEDURE — 76830 TRANSVAGINAL US NON-OB: CPT

## 2024-07-19 PROCEDURE — 76856 US EXAM PELVIC COMPLETE: CPT | Mod: 26 | Performed by: RADIOLOGY

## 2024-08-29 ENCOUNTER — OFFICE VISIT (OUTPATIENT)
Dept: OBGYN | Facility: CLINIC | Age: 39
End: 2024-08-29
Payer: COMMERCIAL

## 2024-08-29 VITALS
BODY MASS INDEX: 22.74 KG/M2 | DIASTOLIC BLOOD PRESSURE: 67 MMHG | SYSTOLIC BLOOD PRESSURE: 100 MMHG | TEMPERATURE: 98 F | WEIGHT: 154 LBS | HEART RATE: 75 BPM

## 2024-08-29 DIAGNOSIS — E06.3 HASHIMOTO'S THYROIDITIS: ICD-10-CM

## 2024-08-29 DIAGNOSIS — N91.1 SECONDARY AMENORRHEA: Primary | ICD-10-CM

## 2024-08-29 PROCEDURE — 99214 OFFICE O/P EST MOD 30 MIN: CPT | Performed by: OBSTETRICS & GYNECOLOGY

## 2024-08-29 NOTE — PROGRESS NOTES
HPI:  Lolis Johnson is a 39 year old female here for a follow-up on her secondary amenorrhea evaluation.        HPI:    Patient was seen last on July 9, 24 for secondary amenorrhea and started provera on 7/19.  She got the period 3 days after the pills were finished.  LMP  7/31/24.  Next period started spontaneously on  8/26.   Labs and pelvic ultrasound reviewed.     Component      Latest Ref Rng 7/9/2024  2:47 PM 7/9/2024  3:16 PM   Free Testosterone Calculated      ng/dL 0.15     Testosterone Total      8 - 60 ng/dL 20     FSH      mIU/mL 34.4     Luteinizing Hormone      mIU/mL 36.6     TSH      0.30 - 4.20 uIU/mL 2.13     T4 Free      0.90 - 1.70 ng/dL 1.42     Thyroid Peroxidase Antibody      <35 IU/mL 333 (H)     Thyroglobulin Antibody      <40 IU/mL <20     Estradiol      pg/mL 120     Progesterone      ng/mL 0.3     Prolactin      5 - 23 ng/mL 4 (L)     HCG Qual Urine      Negative   Negative    Sex Hormone Binding Globulin      30 - 135 nmol/L 110        Legend:  (H) High  (L) Low            Her pelvic US was reviewed:     Exam: US PELVIC TRANSABDOMINAL AND TRANSVAGINAL, 7/19/2024 9:31 AM     Indication: Secondary amenorrhea     Comparison: 11/19/2015     Findings:   Transabdominal and transvaginal pelvic ultrasound. The uterus measures  8.4 x 4.4 x 6.4 cm and is anteverted. There is normal uterine  echogenicity and echotexture. The endometrial stripe measures 7 mm.     The right ovary measures 4.1 x 3.1 x 3.3 cm and the left ovary  measures 2.3 x 1.3 x 1.3 cm. Simple appearing right adnexal  cysts/dominant follicles, the largest measuring up to 2.3 cm. No  adnexal mass lesion is otherwise appreciated. Trace free fluid.                                                                      Impression: Pelvic ultrasound is within normal limits with simple  appearing right adnexal cyst/dominant follicles. Period history:  Menses usually q 26-28  lasting 5      Relationship:  , vasectomy       No LMP  recorded.     Past GYN history:   2023 pap and HPV -- normal        Past Medical History:   Diagnosis Date    Infertility, female     Subclinical hypothyroidism 4/29/2016    Varicella        Past Surgical History:   Procedure Laterality Date    DILATION AND EVACUATION N/A 2/18/2021    Procedure: DILATION AND CURETTAGE, UTERUS;  Surgeon: Christiane Lu MD;  Location: [facility]    SINUS SURGERY         Family History   Problem Relation Age of Onset    Breast Cancer Paternal Grandmother          Medications:    Current Outpatient Medications:     fluticasone (FLONASE) 50 MCG/ACT nasal spray, SPRAY 1 SPRAY INTO EACH NOSTRIL TWICE A DAY AS DIRECTED, Disp: , Rfl:     levothyroxine (SYNTHROID/LEVOTHROID) 100 MCG tablet, Take 1 tablet (100 mcg) by mouth daily, Disp: 90 tablet, Rfl: 3    medroxyPROGESTERone (PROVERA) 10 MG tablet, Take 1 tablet (10 mg) by mouth daily, Disp: 10 tablet, Rfl: 0    Allergies:  Sulfa antibiotics and Sulfasalazine      EXAM:  /67   Pulse 75   Temp 98  F (36.7  C)   Wt 69.9 kg (154 lb)   BMI 22.74 kg/m      General - pleasant female in no acute distress.  Neurological - Alert and oriented  Psych:  normal mood and affect.  normal respiratory and cardiovascular effort      ASSESSMENT:/PLAN:  (N91.1) Secondary amenorrhea  (primary encounter diagnosis)  Comment: normal pelvic ultrasound   Labs c/w irregular hormones and new dx of Hashimoto's.  Normal response to provera challenge.   Plan: last cycle was spontaneous   Long discussion on stress, thyroid, hormone imbalance and other medical condition on the menstrual cycle.   Recommended dietary and lifestyle changes.  Will monitor cycles expectantly now with trying improvement in lifestyle. Continue on current dose of synthroid due to normal TSH.     (E06.3) Hashimoto's thyroiditis  Comment:  new diagnosis, discussed considered autoimmune condition and can be seen with other AI conditions as well.    currently euthyroid with her  synthroid  Plan: Adult Endocrinology  Referral  discussed effect of gluten intolerance and Hashimoto's. Suggested limiting gluten and follow-up with endocrine           33 minutes spent on the date of service for reviewing outside records, chart notes, other tests, counseling the patient and on documentation.       Corrine Hinkle MD

## 2024-10-17 ENCOUNTER — PATIENT OUTREACH (OUTPATIENT)
Dept: CARE COORDINATION | Facility: CLINIC | Age: 39
End: 2024-10-17
Payer: COMMERCIAL

## 2024-10-31 ENCOUNTER — PATIENT OUTREACH (OUTPATIENT)
Dept: CARE COORDINATION | Facility: CLINIC | Age: 39
End: 2024-10-31
Payer: COMMERCIAL

## 2024-12-04 ENCOUNTER — MYC MEDICAL ADVICE (OUTPATIENT)
Dept: OBGYN | Facility: CLINIC | Age: 39
End: 2024-12-04
Payer: COMMERCIAL

## 2025-01-11 ENCOUNTER — HEALTH MAINTENANCE LETTER (OUTPATIENT)
Age: 40
End: 2025-01-11

## 2025-04-03 ENCOUNTER — OFFICE VISIT (OUTPATIENT)
Dept: OBGYN | Facility: CLINIC | Age: 40
End: 2025-04-03
Payer: COMMERCIAL

## 2025-04-03 VITALS
DIASTOLIC BLOOD PRESSURE: 72 MMHG | HEART RATE: 86 BPM | TEMPERATURE: 97.8 F | SYSTOLIC BLOOD PRESSURE: 105 MMHG | HEIGHT: 69 IN | BODY MASS INDEX: 22.51 KG/M2 | WEIGHT: 152 LBS

## 2025-04-03 DIAGNOSIS — Z01.411 ENCOUNTER FOR GYNECOLOGICAL EXAMINATION WITH ABNORMAL FINDING: Primary | ICD-10-CM

## 2025-04-03 DIAGNOSIS — Z13.1 ENCOUNTER FOR SCREENING EXAMINATION FOR IMPAIRED GLUCOSE REGULATION AND DIABETES MELLITUS: ICD-10-CM

## 2025-04-03 DIAGNOSIS — E28.39 PRIMARY OVARIAN INSUFFICIENCY: ICD-10-CM

## 2025-04-03 PROCEDURE — 3078F DIAST BP <80 MM HG: CPT | Performed by: OBSTETRICS & GYNECOLOGY

## 2025-04-03 PROCEDURE — 99214 OFFICE O/P EST MOD 30 MIN: CPT | Mod: 25 | Performed by: OBSTETRICS & GYNECOLOGY

## 2025-04-03 PROCEDURE — 99395 PREV VISIT EST AGE 18-39: CPT | Performed by: OBSTETRICS & GYNECOLOGY

## 2025-04-03 PROCEDURE — 3074F SYST BP LT 130 MM HG: CPT | Performed by: OBSTETRICS & GYNECOLOGY

## 2025-04-03 NOTE — PROGRESS NOTES
Lolis is a 40 year old  female who presents for annual exam.     Menses are irregular  and irregular lasting 5 days.  Menses flow: normal.  Patient's last menstrual period was 2025.. Using vasectomy for contraception.  She is not currently considering pregnancy.  Besides routine health maintenance,  she would like to discuss irregular periods.  Last year had bleeding that lasted 3 months. Had some evaluation done. Then had periods for 7 months - 23 to 35 days.   Not sleeping well, wakes up several times. Getting mild hot flashes, possibly. Usually a cold person. Had really bad dry eyes in the fall. Takes eye drops every morning.    GYNECOLOGIC HISTORY:  Menarche: [age]  Age at first intercourse: 23 Number of lifetime partners: 1  Lolis is sexually active with 1male partner(s) and is currently in monogamous relationship.    History sexually transmitted infections:No STD history  STI testing offered?  Declined  TEO exposure: No  History of abnormal Pap smear: No - age 30- 64 PAP with HPV every 5 years recommended  Family history of breast CA: Yes (Please explain): pgmo  Family history of uterine/ovarian CA: No    Family history of colon CA: No    HEALTH MAINTENANCE:  Cholesterol: (  Cholesterol   Date Value Ref Range Status   2023 184 <200 mg/dL Final    History of abnormal lipids: No  Mammo: no . History of abnormal Mammo: No.  Regular Self Breast Exams: No  Calcium/Vitamin D intake: source:  dairy, dietary supplement(s) Adequate? Yes  TSH: (  TSH   Date Value Ref Range Status   2024 2.13 0.30 - 4.20 uIU/mL Final   10/27/2022 0.05 (L) 0.40 - 4.00 mU/L Final   2021 2.08 0.40 - 4.00 mU/L Final    )  Pap; (  Lab Results   Component Value Date    PAP NIL 2017    PAP NIL 2014    )    HISTORY:  OB History    Para Term  AB Living   4 2 2 0 2 2   SAB IAB Ectopic Multiple Live Births   1 1 0 1 2      # Outcome Date GA Lbr Jose/2nd Weight Sex Type Anes PTL Lv   4 Term  09/10/22 39w2d 04:22 / 00:12 3.72 kg (8 lb 3.2 oz) F Vag-Spont EPI N MARCO      Complications: Infection due to 2019 novel coronavirus      Name: CATALINO LOPEZ      Apgar1: 8  Apgar5: 9   3 IAB 02/16/21 12w5d    AB, COMPLETE      2 Term 04/04/19 39w1d 02:15 / 01:36 3.26 kg (7 lb 3 oz) F Vag-Spont EPI, Local  MARCO      Name: CATALINO LOPEZ      Apgar1: 8  Apgar5: 9   1A SAB 06/21/17 19w1d  0.278 kg (9.8 oz) M Vag-Spont Nitrous, IV Y FD      Complications: Prolonged PROM (>18 hours)      Name: JERONIMO LOPEZ FD      Apgar1: 0  Apgar5: 0   1B SAB 06/21/17 19w1d  0.188 kg (6.6 oz) F Vag-Spont IV, Nitrous Y FD      Complications: Prolonged PROM (>18 hours)      Name: SAE LOPEZ FD      Apgar1: 0  Apgar5: 0      Obstetric Comments   Lia Carrera     Past Medical History:   Diagnosis Date    Infertility, female     Subclinical hypothyroidism 4/29/2016    Varicella      Past Surgical History:   Procedure Laterality Date    DILATION AND EVACUATION N/A 2/18/2021    Procedure: DILATION AND CURETTAGE, UTERUS;  Surgeon: Christiane Lu MD;  Location: [facility]    SINUS SURGERY       Family History   Problem Relation Age of Onset    Breast Cancer Paternal Grandmother      Social History     Socioeconomic History    Marital status:    Tobacco Use    Smoking status: Never    Smokeless tobacco: Never   Vaping Use    Vaping status: Never Used   Substance and Sexual Activity    Alcohol use: Not Currently     Comment: occ    Drug use: No    Sexual activity: Yes     Partners: Male     Birth control/protection: Pill   Other Topics Concern    Parent/sibling w/ CABG, MI or angioplasty before 65F 55M? No     Social Drivers of Health      [facility]    Financial Resource Strain   Stress: No Stress Concern Present (1/6/2021)    [employer] [place] [employer] - Occupational Stress Questionnaire     Feeling of  "Stress : Not at all    [facility], [facility]    Social Connections   Interpersonal Safety: Not At Risk (1/6/2021)    Humiliation, Afraid, Rape, and Kick questionnaire     Fear of Current or Ex-Partner: No     Emotionally Abused: No     Physically Abused: No     Sexually Abused: No       Current Outpatient Medications:     fluticasone (FLONASE) 50 MCG/ACT nasal spray, SPRAY 1 SPRAY INTO EACH NOSTRIL TWICE A DAY AS DIRECTED, Disp: , Rfl:     levothyroxine (SYNTHROID/LEVOTHROID) 100 MCG tablet, Take 1 tablet (100 mcg) by mouth daily., Disp: 90 tablet, Rfl: 3    medroxyPROGESTERone (PROVERA) 10 MG tablet, Take 1 tablet (10 mg) by mouth daily, Disp: 10 tablet, Rfl: 0     Allergies   Allergen Reactions    Sulfa Antibiotics     Sulfasalazine Unknown       Past medical, surgical, social and family history were reviewed and updated in EPIC.    ROS:   C:     NEGATIVE for fever, chills, change in weight  I:       NEGATIVE for worrisome rashes, moles or lesions  E:     NEGATIVE for vision changes or irritation  E/M: NEGATIVE for ear, mouth and throat problems  R:     NEGATIVE for significant cough or SOB  CV:   NEGATIVE for chest pain, palpitations or peripheral edema  GI:     NEGATIVE for nausea, abdominal pain, heartburn, or change in bowel habits  :   NEGATIVE for frequency, dysuria, hematuria, vaginal discharge, or irregular bleeding  M:     NEGATIVE for significant arthralgias or myalgia  N:      NEGATIVE for weakness, dizziness or paresthesias  E:      NEGATIVE for temperature intolerance, skin/hair changes  P:      NEGATIVE for changes in mood or affect.    EXAM:  /72   Pulse 86   Temp 97.8  F (36.6  C)   Ht 1.753 m (5' 9\")   Wt 68.9 kg (152 lb)   LMP 02/02/2025   BMI 22.45 kg/m     BMI: Body mass index is 22.45 kg/m .  Constitutional: healthy, alert and no distress  Head: Normocephalic. No masses, lesions, tenderness or " abnormalities  Neck: Neck supple. Trachea midline. No adenopathy. Thyroid symmetric, normal size.   Cardiovascular: RRR.   Respiratory: Negative.   Breast: Breasts reveal mild symmetric fibrocystic densities, but there are no dominant, discrete, fixed or suspicious masses found.  Gastrointestinal: Abdomen soft, non-tender, non-distended. No masses, organomegaly.  Rectal Exam: deferred  Musculoskeletal: extremities normal  Skin: no suspicious lesions or rashes  Psychiatric: Affect appropriate, cooperative,mentation appears normal.     Component      Latest Ref Rng 7/9/2024  2:47 PM 7/9/2024  3:16 PM   Free Testosterone Calculated      ng/dL 0.15     Testosterone Total      8 - 60 ng/dL 20     FSH      mIU/mL 34.4     Luteinizing Hormone      mIU/mL 36.6     TSH      0.30 - 4.20 uIU/mL 2.13     T4 Free      0.90 - 1.70 ng/dL 1.42     Thyroid Peroxidase Antibody      <35 IU/mL 333 (H)     Thyroglobulin Antibody      <40 IU/mL <20     Estradiol      pg/mL 120     Progesterone      ng/mL 0.3     Prolactin      5 - 23 ng/mL 4 (L)     HCG Qual Urine      Negative   Negative    Sex Hormone Binding Globulin      30 - 135 nmol/L 110        Legend:  (H) High  (L) Low    COUNSELING:   Reviewed preventive health counseling, as reflected in patient instructions       Regular exercise       Healthy diet/nutrition       Osteoporosis prevention/bone health       (Twyla)menopause management   reports that she has never smoked. She has never used smokeless tobacco.    Body mass index is 22.45 kg/m .    FRAX Risk Assessment    ASSESSMENT:  40 year old female with satisfactory annual exam  (Z01.411) Encounter for gynecological examination with abnormal finding  (primary encounter diagnosis)  Comment:   Plan: UTD on HM     (E28.39) Primary ovarian insufficiency  Comment:   Plan: Follicle stimulating hormone, Estradiol, DX         Bone Density, [facility]; [id number]; adrenal         21-hydroxylase autoantibodies (Laboratory         Miscellaneous  Order), [facility]; [id number];         antiadrenal antibodies, quant (Laboratory         Miscellaneous Order), Vitamin D Deficiency        - we reviewed lab results. She has completed fertility and uses vasectomy for contraception  - We discussed possible causes, including Levine syndrome, autoimmune disease, fragile x mutation, primary adrenal insufficiency. We discussed further work-up today as above for these related causes. 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 FMR gene mutation testing should be considered in the future.   - We discussed implications for bone health. Baseline DEXA and Vit D testing ordered.  She will need hormone replacement in the form of OCPs or 17beta estradiol with progestin until age 50. I am providing her with written information about POI. We discussed that if untreated this could translate to osteoporosis, increased fracture risk, increased morbidity/mortality in the future  - We discussed increased CV risk in the setting of POI without hormone replacement. We will confirm diagnosis with repeat labs and further discuss hormone therapy at this time.   - We discussed the emotional implications of this diagnosis.     (Z13.1) Encounter for screening examination for impaired glucose regulation and diabetes mellitus  Comment:   Plan: Hemoglobin A1c

## 2025-04-16 ENCOUNTER — LAB (OUTPATIENT)
Dept: LAB | Facility: CLINIC | Age: 40
End: 2025-04-16
Payer: COMMERCIAL

## 2025-04-16 DIAGNOSIS — E28.39 PRIMARY OVARIAN INSUFFICIENCY: ICD-10-CM

## 2025-04-16 DIAGNOSIS — Z13.1 ENCOUNTER FOR SCREENING EXAMINATION FOR IMPAIRED GLUCOSE REGULATION AND DIABETES MELLITUS: ICD-10-CM

## 2025-04-16 LAB
EST. AVERAGE GLUCOSE BLD GHB EST-MCNC: 100 MG/DL
ESTRADIOL SERPL-MCNC: 38 PG/ML
FSH SERPL IRP2-ACNC: 80.1 MIU/ML
HBA1C MFR BLD: 5.1 % (ref 0–5.6)
Lab: NORMAL
Lab: NORMAL
PERFORMING LABORATORY: NORMAL
PERFORMING LABORATORY: NORMAL
SPECIMEN STATUS: NORMAL
SPECIMEN STATUS: NORMAL
TEST NAME: NORMAL
TEST NAME: NORMAL
VIT D+METAB SERPL-MCNC: 36 NG/ML (ref 20–50)

## 2025-04-22 ENCOUNTER — ANCILLARY PROCEDURE (OUTPATIENT)
Dept: BONE DENSITY | Facility: CLINIC | Age: 40
End: 2025-04-22
Attending: OBSTETRICS & GYNECOLOGY
Payer: COMMERCIAL

## 2025-04-22 DIAGNOSIS — E28.39 PRIMARY OVARIAN INSUFFICIENCY: ICD-10-CM

## 2025-04-22 LAB — LABCORP INTERFACED MISCELLANEOUS TEST RESULT: NORMAL

## 2025-04-22 PROCEDURE — 77080 DXA BONE DENSITY AXIAL: CPT | Mod: TC | Performed by: PHYSICIAN ASSISTANT

## 2025-04-24 ENCOUNTER — VIRTUAL VISIT (OUTPATIENT)
Dept: OBGYN | Facility: CLINIC | Age: 40
End: 2025-04-24
Payer: COMMERCIAL

## 2025-04-24 DIAGNOSIS — E28.39 PRIMARY OVARIAN INSUFFICIENCY: Primary | ICD-10-CM

## 2025-04-24 RX ORDER — LEVONORGESTREL/ETHIN.ESTRADIOL 0.1-0.02MG
1 TABLET ORAL DAILY
Qty: 84 TABLET | Refills: 3 | Status: SHIPPED | OUTPATIENT
Start: 2025-04-24

## 2025-04-24 RX ORDER — LEVONORGESTREL/ETHIN.ESTRADIOL 0.1-0.02MG
1 TABLET ORAL DAILY
Qty: 84 TABLET | Refills: 1 | Status: SHIPPED | OUTPATIENT
Start: 2025-04-24 | End: 2025-04-24

## 2025-04-24 NOTE — PROGRESS NOTES
Lolis is a 40 year old who is being evaluated via a billable video visit.    How would you like to obtain your AVS? MyChart  If the video visit is dropped, the invitation should be resent by: Text to cell phone: [phone]  Will anyone else be joining your video visit? No      Assessment & Plan     Primary ovarian insufficiency  Discussed diagnosis confirmed.   Lab results normal so far, waiting for 21 hydroxylase result  DEXA normal.   Discussed options for hormone therapy  Would like to start now with OCPs as it feels more normal to her at this age.   Answered questions. Anticipate hormone therapy to minimum age 50-55  Consider FRM testing at future date, discussed.   - levonorgestrel-ethinyl estradiol (AVIANE) 0.1-20 MG-MCG tablet; Take 1 tablet by mouth daily.                Subjective   Lolis is a 40 year old, presenting for the following health issues:  lab follow up    HPI    Presents for discussion of primary ovarian insufficiency  Diagnosis recently confirmed  Has been doing reading about it. Feels like she's getting a good understanding of it.     Past Medical History:   Diagnosis Date    Infertility, female     Primary ovarian insufficiency     Subclinical hypothyroidism 04/29/2016    Varicella        Past Surgical History:   Procedure Laterality Date    DILATION AND EVACUATION N/A 2/18/2021    Procedure: DILATION AND CURETTAGE, UTERUS;  Surgeon: Christiane Lu MD;  Location: [facility] OR    SINUS SURGERY         Family History   Problem Relation Age of Onset    Breast Cancer Paternal Grandmother        Social History     Socioeconomic History    Marital status:      Spouse name: Not on file    Number of children: Not on file    Years of education: Not on file    Highest education level: Not on file   Occupational History    Not on file   Tobacco Use    Smoking status: Never    Smokeless tobacco: Never   Vaping Use    Vaping status: Never Used   Substance and Sexual Activity    Alcohol use: Not  Currently     Comment: occ    Drug use: No    Sexual activity: Yes     Partners: Male     Birth control/protection: Pill   Other Topics Concern    Parent/sibling w/ CABG, MI or angioplasty before 65F 55M? No   Social History Narrative    Not on file     Social Drivers of Health     Financial Resource Strain: High Risk (1/1/2022)    [facility]    Financial Resource Strain     Difficulty of Paying Living Expenses: Not on file     Difficulty of Paying Living Expenses: Not on file   Food Insecurity: Not on file   Transportation Needs: Not on file   Physical Activity: Not on file   Stress: No Stress Concern Present (1/6/2021)    [facility] - Occupational Stress Questionnaire     Feeling of Stress : Not at all   Social Connections: Unknown (1/1/2022)    [facility]    Social Connections     Frequency of Communication with Friends and Family: Not on file   Interpersonal Safety: Not At Risk (1/6/2021)    Humiliation, Afraid, Rape, and Kick questionnaire     Fear of Current or Ex-Partner: No     Emotionally Abused: No     Physically Abused: No     Sexually Abused: No   Housing Stability: Not on file       Current Outpatient Medications   Medication Sig Dispense Refill    fluticasone (FLONASE) 50 MCG/ACT nasal spray SPRAY 1 SPRAY INTO EACH NOSTRIL TWICE A DAY AS DIRECTED      levonorgestrel-ethinyl estradiol (AVIANE) 0.1-20 MG-MCG tablet Take 1 tablet by mouth daily. 84 tablet 3    levothyroxine (SYNTHROID/LEVOTHROID) 100 MCG tablet Take 1 tablet (100 mcg) by mouth daily. 90 tablet 3    medroxyPROGESTERone (PROVERA) 10 MG tablet Take 1 tablet (10 mg) by mouth daily 10 tablet 0     No current facility-administered medications for this visit.          Allergies   Allergen Reactions    Sulfa Antibiotics      Sulfasalazine Unknown           Review of Systems  Constitutional, HEENT, cardiovascular, pulmonary, gi and gu systems are negative, except as otherwise noted.      Objective           Vitals:  No vitals were obtained today due to virtual visit.    Physical Exam   GENERAL: alert and no distress  EYES: Eyes grossly normal to inspection.  No discharge or erythema, or obvious scleral/conjunctival abnormalities.  RESP: No audible wheeze, cough, or visible cyanosis.    SKIN: Visible skin clear. No significant rash, abnormal pigmentation or lesions.  NEURO: Cranial nerves grossly intact.  Mentation and speech appropriate for age.  PSYCH: Appropriate affect, tone, and pace of words    Component      Latest Ref Rng 4/16/2025  3:41 PM   Estimated Average Glucose      <117 mg/dL 100    Hemoglobin A1C      0.0 - 5.6 % 5.1    FSH      mIU/mL 80.1    Estradiol      pg/mL 38    Vitamin D, Total (25-Hydroxy)      20 - 50 ng/mL 36          EXAM: DX AXIAL HIPS/SPINE  LOCATION: [facility]  DATE: 4/22/2025     INDICATION:  BMD screening. Baseline. Primary ovarian insufficiency.  DEMOGRAPHICS: Age- 40 years. Gender- Female. Menopausal status- Premenopausal.  COMPARISON: No prior studies available on the current scanner.  TECHNIQUE: Dual-energy x-ray absorptiometry (DXA) performed with routine technique.        FINDINGS:     DXA RESULTS  -Lumbar Spine: L1-L4: BMD: 1.369 g/cm2. T-score: 1.4. Z-score: 1.4.   -RIGHT Hip Total: BMD: 1.174 g/cm2. T-score: 1.3. Z-score: 1.5.  -RIGHT Hip Femoral neck: BMD: 1.124 g/cm2. T-score: 0.6. Z-score: 1.0.  -LEFT Hip Total: BMD: 1.148 g/cm2. T-score: 1.1. Z-score: 1.3.  -LEFT Hip Femoral neck: BMD: 1.096 g/cm2. T-score: 0.4. Z-score: 0.8.     Z-SCORE CRITERIA  -Within the expected range for age: Z-score above -2.0.  -Below the expected range for age: Z-score of -2.0 or lower.     BMD reporting of Z-score is preferred in pre-menopausal females and males younger than age 50.   Therefore, the World Health Organization (WHO) densitometric classification using T-scores is not applicable to this patient (that criteria is useful for   perimenopausal women, postmenopausal women, and men aged 50 years and older).     FRACTURE RISK  -The FRAX risk calculator is not applicable due to the patient being a premenopausal or perimenopausal female.                                                                      IMPRESSION: NORMAL. No increased fracture risk identified. The Z-score is within the expected range for age (Z-score above -2.0).       Video-Visit Details    Type of service:  Video Visit   Originating Location (pt. Location): Home    Distant Location (provider location):  On-site  Platform used for Video Visit: Caitlin  Signed Electronically by: Christiane Lu MD

## 2025-04-26 LAB — LABCORP INTERFACED MISCELLANEOUS TEST RESULT: NORMAL

## (undated) DEVICE — LINEN GOWN X4 5410

## (undated) DEVICE — SOL NACL 0.9% IRRIG 1000ML BOTTLE 2F7124

## (undated) DEVICE — SYR 01ML 27GA 0.5" NDL TBC 309623

## (undated) DEVICE — SUCTION VACUUM CANISTER STANDARD W/LID&CAPS 003987-901

## (undated) DEVICE — GLOVE PROTEXIS BLUE W/NEU-THERA 7.0  2D73EB70

## (undated) DEVICE — SUCTION CANNULA UTERINE 12MM CVD  21555

## (undated) DEVICE — PAD CHUX UNDERPAD 30X36" P3036C

## (undated) DEVICE — SOL WATER IRRIG 1000ML BOTTLE 2F7114

## (undated) DEVICE — GLOVE PROTEXIS W/NEU-THERA 6.5  2D73TE65

## (undated) DEVICE — PEN MARKING SKIN W/LABELS 31145918

## (undated) DEVICE — Device

## (undated) DEVICE — LINEN TOWEL PACK X5 5464

## (undated) RX ORDER — GLYCOPYRROLATE 0.2 MG/ML
INJECTION INTRAMUSCULAR; INTRAVENOUS
Status: DISPENSED
Start: 2021-02-18

## (undated) RX ORDER — ONDANSETRON 2 MG/ML
INJECTION INTRAMUSCULAR; INTRAVENOUS
Status: DISPENSED
Start: 2021-02-18

## (undated) RX ORDER — DEXAMETHASONE SODIUM PHOSPHATE 4 MG/ML
INJECTION, SOLUTION INTRA-ARTICULAR; INTRALESIONAL; INTRAMUSCULAR; INTRAVENOUS; SOFT TISSUE
Status: DISPENSED
Start: 2021-02-18

## (undated) RX ORDER — EPHEDRINE SULFATE 50 MG/ML
INJECTION, SOLUTION INTRAMUSCULAR; INTRAVENOUS; SUBCUTANEOUS
Status: DISPENSED
Start: 2021-02-18

## (undated) RX ORDER — CEFAZOLIN SODIUM 1 G/3ML
INJECTION, POWDER, FOR SOLUTION INTRAMUSCULAR; INTRAVENOUS
Status: DISPENSED
Start: 2021-02-18

## (undated) RX ORDER — KETOROLAC TROMETHAMINE 30 MG/ML
INJECTION, SOLUTION INTRAMUSCULAR; INTRAVENOUS
Status: DISPENSED
Start: 2021-02-18

## (undated) RX ORDER — LIDOCAINE HYDROCHLORIDE 10 MG/ML
INJECTION, SOLUTION EPIDURAL; INFILTRATION; INTRACAUDAL; PERINEURAL
Status: DISPENSED
Start: 2021-02-18

## (undated) RX ORDER — ACETAMINOPHEN 325 MG/1
TABLET ORAL
Status: DISPENSED
Start: 2021-02-18

## (undated) RX ORDER — FENTANYL CITRATE-0.9 % NACL/PF 10 MCG/ML
PLASTIC BAG, INJECTION (ML) INTRAVENOUS
Status: DISPENSED
Start: 2021-02-18

## (undated) RX ORDER — LIDOCAINE HYDROCHLORIDE 20 MG/ML
INJECTION, SOLUTION EPIDURAL; INFILTRATION; INTRACAUDAL; PERINEURAL
Status: DISPENSED
Start: 2021-02-18

## (undated) RX ORDER — FENTANYL CITRATE 50 UG/ML
INJECTION, SOLUTION INTRAMUSCULAR; INTRAVENOUS
Status: DISPENSED
Start: 2021-02-18

## (undated) RX ORDER — PROPOFOL 10 MG/ML
INJECTION, EMULSION INTRAVENOUS
Status: DISPENSED
Start: 2021-02-18

## (undated) RX ORDER — DOXYCYCLINE 100 MG/10ML
INJECTION, POWDER, LYOPHILIZED, FOR SOLUTION INTRAVENOUS
Status: DISPENSED
Start: 2021-02-18